# Patient Record
Sex: FEMALE | Race: WHITE | Employment: FULL TIME | URBAN - METROPOLITAN AREA
[De-identification: names, ages, dates, MRNs, and addresses within clinical notes are randomized per-mention and may not be internally consistent; named-entity substitution may affect disease eponyms.]

---

## 2017-01-05 ENCOUNTER — HOSPITAL ENCOUNTER (OUTPATIENT)
Dept: MAMMOGRAPHY | Facility: HOSPITAL | Age: 52
Discharge: HOME | End: 2017-01-05
Attending: INTERNAL MEDICINE
Payer: COMMERCIAL

## 2017-01-05 VITALS
DIASTOLIC BLOOD PRESSURE: 77 MMHG | OXYGEN SATURATION: 96 % | SYSTOLIC BLOOD PRESSURE: 114 MMHG | HEART RATE: 79 BPM | RESPIRATION RATE: 18 BRPM

## 2017-01-05 DIAGNOSIS — R92.8 ABNORMAL MAMMOGRAM OF RIGHT BREAST: ICD-10-CM

## 2017-01-05 PROCEDURE — 88305 TISSUE EXAM BY PATHOLOGIST: CPT | Performed by: INTERNAL MEDICINE

## 2017-01-05 PROCEDURE — 19081 BX BREAST 1ST LESION STRTCTC: CPT

## 2017-01-05 RX ORDER — SODIUM CHLORIDE 9 MG/ML
INJECTION, SOLUTION INTRAVENOUS
Status: DISPENSED
Start: 2017-01-05 | End: 2017-01-05

## 2017-01-05 RX ORDER — LIDOCAINE HYDROCHLORIDE AND EPINEPHRINE 10; 10 MG/ML; UG/ML
20 INJECTION, SOLUTION INFILTRATION; PERINEURAL ONCE
Status: COMPLETED | OUTPATIENT
Start: 2017-01-05 | End: 2017-01-05

## 2017-01-05 RX ORDER — LIDOCAINE HYDROCHLORIDE 10 MG/ML
5 INJECTION, SOLUTION EPIDURAL; INFILTRATION; INTRACAUDAL; PERINEURAL ONCE
Status: COMPLETED | OUTPATIENT
Start: 2017-01-05 | End: 2017-01-05

## 2017-01-05 RX ORDER — MAGNESIUM HYDROXIDE 1200 MG/15ML
250 LIQUID ORAL CONTINUOUS
Status: DISCONTINUED | OUTPATIENT
Start: 2017-01-05 | End: 2017-01-09

## 2017-01-05 RX ORDER — LIDOCAINE HYDROCHLORIDE AND EPINEPHRINE 10; 10 MG/ML; UG/ML
INJECTION, SOLUTION INFILTRATION; PERINEURAL
Status: DISPENSED
Start: 2017-01-05 | End: 2017-01-05

## 2017-01-05 RX ORDER — LIDOCAINE HYDROCHLORIDE 10 MG/ML
INJECTION, SOLUTION EPIDURAL; INFILTRATION; INTRACAUDAL; PERINEURAL
Status: DISPENSED
Start: 2017-01-05 | End: 2017-01-05

## 2017-01-05 RX ADMIN — LIDOCAINE HYDROCHLORIDE 2 ML: 10 INJECTION, SOLUTION EPIDURAL; INFILTRATION; INTRACAUDAL; PERINEURAL at 12:48:00

## 2017-01-05 RX ADMIN — LIDOCAINE HYDROCHLORIDE AND EPINEPHRINE 2 ML: 10; 10 INJECTION, SOLUTION INFILTRATION; PERINEURAL at 12:45:00

## 2017-01-05 NOTE — BRIEF PROCEDURE NOTE
INITIAL MAMMOGRAM COMPLETED BY  Saqib Cheng MAMMO TECH  HX  NEW CALCS  RIGHT BREAST NO FAMILY HX   BCP FOR 10 YEARS  NO HRT USE NO PREVIOUS BX    DR WESLEY HERE TO EXPLAIN PROCEDURE  AND RISK OF PROCEDURE QUESTIONS ANSWERED BREAST MAY BE TOO SMALL TO DO B

## 2017-01-09 ENCOUNTER — TELEPHONE (OUTPATIENT)
Dept: INTERNAL MEDICINE CLINIC | Facility: CLINIC | Age: 52
End: 2017-01-09

## 2017-01-09 ENCOUNTER — NURSE NAVIGATOR ENCOUNTER (OUTPATIENT)
Dept: HEMATOLOGY/ONCOLOGY | Facility: HOSPITAL | Age: 52
End: 2017-01-09

## 2017-01-09 NOTE — TELEPHONE ENCOUNTER
Sherly calling from the breast health center regarding if pt was given the results or if the doctor wanted the breast [facility] center to contact pt regarding results. .. please advise

## 2017-01-09 NOTE — TELEPHONE ENCOUNTER
Result Notes      Notes Recorded by Britta Nissen, MD on 1/7/2017 at 4:33 PM  Send  Letter and copy of test result.   Notified findings Benign

## 2017-01-10 ENCOUNTER — TELEPHONE (OUTPATIENT)
Dept: FAMILY MEDICINE CLINIC | Facility: CLINIC | Age: 52
End: 2017-01-10

## 2017-01-10 NOTE — TELEPHONE ENCOUNTER
Rebeca, called to clarify if pt aware of breast biopsy result. Informed:  Result Notes        Notes Recorded by Lor Brady MD on 1/7/2017 at 4:33 PM  Send  Letter and copy of test result.   Notified findings Benign

## 2017-02-15 RX ORDER — FEXOFENADINE HCL AND PSEUDOEPHEDRINE HCI 60; 120 MG/1; MG/1
TABLET, EXTENDED RELEASE ORAL
Qty: 90 TABLET | Refills: 3 | Status: SHIPPED | OUTPATIENT
Start: 2017-02-15 | End: 2017-08-14

## 2017-02-15 NOTE — TELEPHONE ENCOUNTER
Last issued on 4/2013, please advise    Recent Visits       Provider Department Primary Dx    3 months ago Randi Barragan MD [facility], [address], [place] Dysuria    7 months ago Gemma Burton MD [facility], [facility], [address]

## 2017-03-06 ENCOUNTER — OFFICE VISIT (OUTPATIENT)
Dept: INTERNAL MEDICINE CLINIC | Facility: CLINIC | Age: 52
End: 2017-03-06

## 2017-03-06 VITALS
DIASTOLIC BLOOD PRESSURE: 86 MMHG | HEIGHT: 65 IN | HEART RATE: 83 BPM | BODY MASS INDEX: 18.49 KG/M2 | SYSTOLIC BLOOD PRESSURE: 122 MMHG | WEIGHT: 111 LBS

## 2017-03-06 DIAGNOSIS — M54.50 ACUTE LEFT-SIDED LOW BACK PAIN WITHOUT SCIATICA: Primary | ICD-10-CM

## 2017-03-06 PROCEDURE — 99213 OFFICE O/P EST LOW 20 MIN: CPT | Performed by: INTERNAL MEDICINE

## 2017-03-06 PROCEDURE — 99212 OFFICE O/P EST SF 10 MIN: CPT | Performed by: INTERNAL MEDICINE

## 2017-03-06 RX ORDER — METHOCARBAMOL 500 MG/1
500 TABLET, FILM COATED ORAL 3 TIMES DAILY
Qty: 40 TABLET | Refills: 0 | Status: SHIPPED | OUTPATIENT
Start: 2017-03-06 | End: 2017-08-14

## 2017-03-06 NOTE — PROGRESS NOTES
HPI:    Patient ID: Edward Holman is a 49year old female.     HPI    radiates down to left leg   Standing up pain goes down the leg  Pain mild to moderated   Relieved by rest and prn otc pain med temporarily  Ongoing for more than a week    /86 m Allergic rhinitis    • Allergy    • Seizures [facility])           Past Surgical History    OTHER SURGICAL HISTORY  2005    Comment Brain and neck    OTHER SURGICAL HISTORY Left 1983    Comment Knee    OTHER SURGICAL HISTORY  1985    Comment Devated septum

## 2017-03-06 NOTE — PATIENT INSTRUCTIONS
Back Care Tips    Caring for your back  These are things you can do to prevent a recurrence of acute back pain and to reduce symptoms from chronic back pain:  · Maintain a healthy weight.  If you are overweight, losing weight will help most types of back · Be careful if you are given prescription pain medicines, narcotics, or medicine for muscle spasm. They can cause drowsiness, affect your coordination, reflexes, and judgment. Do not drive or operate heavy machinery while taking these types of medicines. The best way to sleep is on your side with your knees bent. Put a low pillow under your head to support your neck in a neutral spine position. Avoid thick pillows that bend your neck to one side.  Put a pillow between your legs to further relax your lower b · It can be localized to one spot or area, or it can be more generalized. · It can spread or radiate upwards, to the front, or go down your arms or legs (sciatica). · It can cause muscle spasm.   Most of the time, mechanical problems with the muscles or s · At first, do not try to stretch out the sore spots. If there is a strain, it is not like the good soreness you get after exercising without an injury. In this case, stretching may make it worse. · Avoid prolong sitting, long car rides, or travel.  This p · Be careful if you are given a prescription medicines, narcotics, or medicine for muscle spasms. They can cause drowsiness, affect your coordination, reflexes, and judgement. Do not drive or operate heavy machinery.   Follow-up care  Follow up with your he Most of the time, mechanical problems with the muscles or spine cause the pain. Mechanical problems are usually caused by an injury to the muscles or ligaments. While illness can cause back pain, it is usually not caused by a serious illness.  Mechanical pr · During the first 24 to 72 hours after an acute injury or flare up of chronic back pain, apply an ice pack to the painful area for 20 minutes and then remove it for 20 minutes. Do this over a period of 60 to 90 minutes or several times a day.  This will re · Trouble breathing  · Confusion  · Very drowsy or trouble awakening  · Fainting or loss of consciousness  · Rapid or very slow heart rate  · Loss of bowel or bladder control  When to seek medical advice  Call your healthcare provider right away if any of · Tuck your head and lift (arch) your back. · Hold for 5 seconds  · Return to starting position. · Repeat 5 times. Date Last Reviewed: 8/16/2015  © 0547-4888 The [address] [place] [address]. All rights reserved.  This

## 2017-05-31 ENCOUNTER — HOSPITAL ENCOUNTER (OUTPATIENT)
Age: 52
Discharge: HOME | End: 2017-05-31
Attending: EMERGENCY MEDICINE
Payer: COMMERCIAL

## 2017-05-31 ENCOUNTER — APPOINTMENT (OUTPATIENT)
Dept: GENERAL RADIOLOGY | Age: 52
End: 2017-05-31
Attending: EMERGENCY MEDICINE
Payer: COMMERCIAL

## 2017-05-31 ENCOUNTER — PATIENT MESSAGE (OUTPATIENT)
Dept: INTERNAL MEDICINE CLINIC | Facility: CLINIC | Age: 52
End: 2017-05-31

## 2017-05-31 VITALS
OXYGEN SATURATION: 100 % | RESPIRATION RATE: 16 BRPM | SYSTOLIC BLOOD PRESSURE: 142 MMHG | BODY MASS INDEX: 19.16 KG/M2 | DIASTOLIC BLOOD PRESSURE: 82 MMHG | HEIGHT: 65 IN | HEART RATE: 82 BPM | TEMPERATURE: 98 F | WEIGHT: 115 LBS

## 2017-05-31 DIAGNOSIS — M54.9 BACK PAIN WITH RADIATION: ICD-10-CM

## 2017-05-31 DIAGNOSIS — M62.838 SPASM OF MUSCLE: ICD-10-CM

## 2017-05-31 DIAGNOSIS — M54.32 SCIATICA OF LEFT SIDE: Primary | ICD-10-CM

## 2017-05-31 PROCEDURE — 99204 OFFICE O/P NEW MOD 45 MIN: CPT

## 2017-05-31 PROCEDURE — 72100 X-RAY EXAM L-S SPINE 2/3 VWS: CPT | Performed by: EMERGENCY MEDICINE

## 2017-05-31 PROCEDURE — 99213 OFFICE O/P EST LOW 20 MIN: CPT

## 2017-05-31 RX ORDER — DIAZEPAM 5 MG/1
5 TABLET ORAL EVERY 8 HOURS PRN
Qty: 10 TABLET | Refills: 0 | Status: SHIPPED | OUTPATIENT
Start: 2017-05-31 | End: 2017-08-14

## 2017-05-31 RX ORDER — NAPROXEN 500 MG/1
500 TABLET ORAL 2 TIMES DAILY PRN
Qty: 14 TABLET | Refills: 0 | Status: SHIPPED | OUTPATIENT
Start: 2017-05-31 | End: 2017-06-07

## 2017-05-31 RX ORDER — LIDOCAINE 50 MG/G
1 PATCH TOPICAL EVERY 24 HOURS
Qty: 15 PATCH | Refills: 0 | Status: SHIPPED | OUTPATIENT
Start: 2017-05-31 | End: 2017-06-15

## 2017-05-31 NOTE — ED INITIAL ASSESSMENT (HPI)
PATIENT ARRIVED AMBULATORY TO ROOM C/O PAIN ORIGINATING IN THE LEFT BUTTOCKS AND \"SHOOTING\" DOWN THE LEFT LEG. PATIENT STATES PAIN STARTED 2 DAYS AGO. PATIENT DENIES ACUTE INJURY.  PATIENT STATES \"I WAS DOING SOME YARD WORK AND THEN JOE BEEN DOING A LOT

## 2017-05-31 NOTE — TELEPHONE ENCOUNTER
From: Laila Schaffer  To: Arlina Baumgarten, MD  Sent: 5/31/2017 12:16 AM CDT  Subject: Other    I have severe pain shooting down my left buttock down my leg from my lower back generating from my lower back. Had an office visit for this previously.  Is muc

## 2017-05-31 NOTE — ED PROVIDER NOTES
Patient Seen in: [facility]    History   Patient presents with:  Back Pain (musculoskeletal)    Stated Complaint: lower back pain left leg    HPI    55-year-old female patient presents her complaining of 5 days of low kenji Smokeless Status: Never Used                        Alcohol Use: Yes           0.0 oz/week       0 Standard drinks or equivalent per week       Comment: Beer and Wine, Occasionally;        Review of Systems    Positive for stated complai Elvin Schumacher [address]  [address]  [phone]    Schedule an appointment as soon as possible for a visit  For reevaluation of your symptoms      Medications Prescribed:  Current Discharge Medication List    START taking these medications

## 2017-06-01 ENCOUNTER — HOSPITAL ENCOUNTER (EMERGENCY)
Facility: HOSPITAL | Age: 52
Discharge: HOME | End: 2017-06-01
Attending: EMERGENCY MEDICINE
Payer: COMMERCIAL

## 2017-06-01 ENCOUNTER — TELEPHONE (OUTPATIENT)
Dept: FAMILY MEDICINE CLINIC | Facility: CLINIC | Age: 52
End: 2017-06-01

## 2017-06-01 VITALS
TEMPERATURE: 98 F | WEIGHT: 113 LBS | RESPIRATION RATE: 16 BRPM | OXYGEN SATURATION: 100 % | SYSTOLIC BLOOD PRESSURE: 105 MMHG | DIASTOLIC BLOOD PRESSURE: 56 MMHG | BODY MASS INDEX: 18.83 KG/M2 | HEART RATE: 58 BPM | HEIGHT: 65 IN

## 2017-06-01 DIAGNOSIS — M54.40 ACUTE RIGHT-SIDED LOW BACK PAIN WITH SCIATICA, SCIATICA LATERALITY UNSPECIFIED: Primary | ICD-10-CM

## 2017-06-01 PROCEDURE — 99283 EMERGENCY DEPT VISIT LOW MDM: CPT

## 2017-06-01 PROCEDURE — 96372 THER/PROPH/DIAG INJ SC/IM: CPT

## 2017-06-01 RX ORDER — HYDROMORPHONE HYDROCHLORIDE 1 MG/ML
1 INJECTION, SOLUTION INTRAMUSCULAR; INTRAVENOUS; SUBCUTANEOUS ONCE
Status: COMPLETED | OUTPATIENT
Start: 2017-06-01 | End: 2017-06-01

## 2017-06-01 RX ORDER — HYDROCODONE BITARTRATE AND ACETAMINOPHEN 5; 325 MG/1; MG/1
1-2 TABLET ORAL EVERY 4 HOURS PRN
Qty: 20 TABLET | Refills: 0 | Status: SHIPPED | OUTPATIENT
Start: 2017-06-01 | End: 2017-06-02

## 2017-06-01 RX ORDER — ONDANSETRON 4 MG/1
4 TABLET, ORALLY DISINTEGRATING ORAL ONCE
Status: COMPLETED | OUTPATIENT
Start: 2017-06-01 | End: 2017-06-01

## 2017-06-01 RX ORDER — TRAMADOL HYDROCHLORIDE 50 MG/1
TABLET ORAL EVERY 4 HOURS PRN
Qty: 20 TABLET | Refills: 0 | Status: SHIPPED | OUTPATIENT
Start: 2017-06-01 | End: 2017-06-05

## 2017-06-01 NOTE — ED INITIAL ASSESSMENT (HPI)
Pt states she was dx with back spasms at [facility] yesterday and now pain is worse radiating down left leg, medication on helping

## 2017-06-01 NOTE — TELEPHONE ENCOUNTER
Dr. Vivienne Kat: please advise    Actions Requested: Patient continues with pain despite current medication regimen given by Immediate care. Please advise if any other pain reliever/or recommendations.   Situation/Background   Problem: left side buttock pain/low

## 2017-06-01 NOTE — ED PROVIDER NOTES
Patient Seen in: [facility] AND [facility] Emergency Department    History   Patient presents with:  Back Pain (musculoskeletal)    Stated Complaint: pain shooting down leg    HPI    Patient presents with 6 days of pain in the left lower back radiating down the 1 tablet (500 mg total) by mouth 3 (three) times daily. PRN   Fexofenadine-Pseudoephed ER  MG Oral Tablet 12 Hr,  Take  by mouth.  take 1 by Oral route  every 24 hours       Family History   Problem Relation Age of Onset   • Cancer Father      Stomach auscultation bilaterally with good effort. No wheezes, ronchi, or rales. Abdomen:  Soft, non-tender, non-distended. No masses, no hepato-splenomegaly. Negative McBurney's point tenderness. Musculoskeletal:  Good muscle tone.   Lower extremity pulses 2+ [id number]  [phone]    In 3 days  For re-check    Monik Aguilar MD  [facility] [address]  [address] [place] [address] Ave., Oniel Black, [address]  [address] [place] [address] [phone]            Medications Prescribed:

## 2017-06-01 NOTE — ED NOTES
Called patient as per smart er patient feeling worse. Patient tearful during phone conversation, stating pain is severe and unrelieved by medications prescribed in ic. Reccommended that patient present to er for further evaluation.

## 2017-06-02 ENCOUNTER — HOSPITAL ENCOUNTER (OUTPATIENT)
Dept: MRI IMAGING | Age: 52
Discharge: HOME | End: 2017-06-02
Attending: INTERNAL MEDICINE
Payer: COMMERCIAL

## 2017-06-02 ENCOUNTER — OFFICE VISIT (OUTPATIENT)
Dept: INTERNAL MEDICINE CLINIC | Facility: CLINIC | Age: 52
End: 2017-06-02

## 2017-06-02 VITALS
HEIGHT: 65 IN | SYSTOLIC BLOOD PRESSURE: 143 MMHG | RESPIRATION RATE: 18 BRPM | TEMPERATURE: 98 F | BODY MASS INDEX: 18.66 KG/M2 | DIASTOLIC BLOOD PRESSURE: 84 MMHG | WEIGHT: 112 LBS | HEART RATE: 71 BPM

## 2017-06-02 DIAGNOSIS — M54.32 SCIATICA OF LEFT SIDE: Primary | ICD-10-CM

## 2017-06-02 DIAGNOSIS — M54.32 SCIATICA OF LEFT SIDE: ICD-10-CM

## 2017-06-02 PROCEDURE — 99214 OFFICE O/P EST MOD 30 MIN: CPT | Performed by: INTERNAL MEDICINE

## 2017-06-02 PROCEDURE — 81002 URINALYSIS NONAUTO W/O SCOPE: CPT | Performed by: INTERNAL MEDICINE

## 2017-06-02 PROCEDURE — 72148 MRI LUMBAR SPINE W/O DYE: CPT | Performed by: INTERNAL MEDICINE

## 2017-06-02 PROCEDURE — 99213 OFFICE O/P EST LOW 20 MIN: CPT | Performed by: INTERNAL MEDICINE

## 2017-06-02 RX ORDER — LIDOCAINE 50 MG/G
1 PATCH TOPICAL EVERY 24 HOURS
Qty: 1 PATCH | Refills: 0 | Status: ON HOLD
Start: 2017-06-02 | End: 2017-06-05

## 2017-06-02 RX ORDER — METHYLPREDNISOLONE 4 MG/1
TABLET ORAL
Qty: 1 PACKAGE | Refills: 0 | Status: ON HOLD | OUTPATIENT
Start: 2017-06-02 | End: 2017-06-06

## 2017-06-02 NOTE — PROGRESS NOTES
HPI:    Patient ID: Denzel Del Toro is a 47year old female.     HPI    Follow up   Was in the ER  With sciatica  Xray arthriis noted  Pt given NSAID and tramdol pain 10/10  Presents for follow up    /84 mmHg  Pulse 71  Temp(Src) 97.6 °F (36.4 °C) ( needed. Disp:  Rfl:    Naproxen Sodium (ALEVE OR) Take by mouth as needed. Disp:  Rfl:    diazepam 5 MG Oral Tab Take 1 tablet (5 mg total) by mouth every 8 (eight) hours as needed (For your back spasm).  Disp: 10 tablet Rfl: 0   lidocaine 5 % External Patc oropharyngeal exudate. Eyes: Conjunctivae and EOM are normal. Pupils are equal, round, and reactive to light. Right eye exhibits no discharge. Left eye exhibits no discharge. No scleral icterus. Neck: Neck supple. No thyromegaly present.    Karmen Ortiz 1.013   URINE PH      5.0 - 8.0 6.0   PROTEIN, URINE, QUAL. Negative mg/dL Negative   GLUCOSE, URINE, QUAL. Negative mg/dL Negative   KETONES, URINE      Negative mg/dL Negative   BILIRUBIN, URINE, QUAL.       Negative Negative   BLOOD, URINE CONCLUSION:       L3-L4: Mild central stenosis. Mild bilateral foraminal narrowing.      L4-L5: Mild to moderate central stenosis. Mild bilateral foraminal narrowing.      L5-S1: Moderate left, and mild right foraminal narrowing.  Broad disc bulge contact

## 2017-06-02 NOTE — TELEPHONE ENCOUNTER
Clinical Impression:  From ER MD    Acute right-sided low back pain with sciatica, sciatica laterality unspecified  (primary encounter diagnosis)  Xray was done   And given norco for pain  Should come in for follow up  Add to SDS tomorrow bet 1 to 2 PM  Ma

## 2017-06-04 ENCOUNTER — PATIENT MESSAGE (OUTPATIENT)
Dept: INTERNAL MEDICINE CLINIC | Facility: CLINIC | Age: 52
End: 2017-06-04

## 2017-06-05 ENCOUNTER — HOSPITAL ENCOUNTER (OUTPATIENT)
Facility: HOSPITAL | Age: 52
Setting detail: OBSERVATION
Discharge: HOME | End: 2017-06-06
Attending: EMERGENCY MEDICINE | Admitting: PHYSICAL MEDICINE & REHABILITATION
Payer: COMMERCIAL

## 2017-06-05 ENCOUNTER — TELEPHONE (OUTPATIENT)
Dept: INTERNAL MEDICINE CLINIC | Facility: CLINIC | Age: 52
End: 2017-06-05

## 2017-06-05 DIAGNOSIS — M54.59 INTRACTABLE LOW BACK PAIN: Primary | ICD-10-CM

## 2017-06-05 PROBLEM — M54.16 LUMBAR RADICULOPATHY: Status: ACTIVE | Noted: 2017-06-05

## 2017-06-05 PROBLEM — M51.36 ANNULAR TEAR OF LUMBAR DISC: Status: ACTIVE | Noted: 2017-06-05

## 2017-06-05 PROBLEM — M43.16 SPONDYLOLISTHESIS, LUMBAR REGION: Status: ACTIVE | Noted: 2017-06-05

## 2017-06-05 PROBLEM — M51.26 LUMBAR HERNIATED DISC: Status: ACTIVE | Noted: 2017-06-05

## 2017-06-05 PROBLEM — M48.061 LUMBAR FORAMINAL STENOSIS: Status: ACTIVE | Noted: 2017-06-05

## 2017-06-05 PROBLEM — M51.9 LUMBAR DISC DISEASE: Status: ACTIVE | Noted: 2017-06-05

## 2017-06-05 PROBLEM — M51.369 ANNULAR TEAR OF LUMBAR DISC: Status: ACTIVE | Noted: 2017-06-05

## 2017-06-05 PROBLEM — R73.9 HYPERGLYCEMIA: Status: ACTIVE | Noted: 2017-06-05

## 2017-06-05 PROBLEM — M48.061 LUMBAR STENOSIS: Status: ACTIVE | Noted: 2017-06-05

## 2017-06-05 PROCEDURE — 99219 INITIAL OBSERVATION CARE,LEVL II: CPT | Performed by: HOSPITALIST

## 2017-06-05 PROCEDURE — 99244 OFF/OP CNSLTJ NEW/EST MOD 40: CPT | Performed by: PHYSICAL MEDICINE & REHABILITATION

## 2017-06-05 RX ORDER — HYDROMORPHONE HYDROCHLORIDE 1 MG/ML
0.2 INJECTION, SOLUTION INTRAMUSCULAR; INTRAVENOUS; SUBCUTANEOUS EVERY 2 HOUR PRN
Status: DISCONTINUED | OUTPATIENT
Start: 2017-06-05 | End: 2017-06-06

## 2017-06-05 RX ORDER — TRAMADOL HYDROCHLORIDE 50 MG/1
TABLET ORAL EVERY 8 HOURS PRN
Qty: 90 TABLET | Refills: 0 | Status: SHIPPED
Start: 2017-06-05 | End: 2017-06-05

## 2017-06-05 RX ORDER — HYDROMORPHONE HYDROCHLORIDE 1 MG/ML
0.5 INJECTION, SOLUTION INTRAMUSCULAR; INTRAVENOUS; SUBCUTANEOUS ONCE
Status: COMPLETED | OUTPATIENT
Start: 2017-06-05 | End: 2017-06-05

## 2017-06-05 RX ORDER — 0.9 % SODIUM CHLORIDE 0.9 %
VIAL (ML) INJECTION
Status: COMPLETED
Start: 2017-06-05 | End: 2017-06-05

## 2017-06-05 RX ORDER — ONDANSETRON 2 MG/ML
4 INJECTION INTRAMUSCULAR; INTRAVENOUS EVERY 6 HOURS PRN
Status: DISCONTINUED | OUTPATIENT
Start: 2017-06-05 | End: 2017-06-06

## 2017-06-05 RX ORDER — TRAMADOL HYDROCHLORIDE 50 MG/1
100 TABLET ORAL EVERY 6 HOURS PRN
Status: DISCONTINUED | OUTPATIENT
Start: 2017-06-05 | End: 2017-06-06

## 2017-06-05 RX ORDER — HYDROMORPHONE HYDROCHLORIDE 1 MG/ML
0.4 INJECTION, SOLUTION INTRAMUSCULAR; INTRAVENOUS; SUBCUTANEOUS EVERY 2 HOUR PRN
Status: DISCONTINUED | OUTPATIENT
Start: 2017-06-05 | End: 2017-06-06

## 2017-06-05 RX ORDER — TRAMADOL HYDROCHLORIDE 50 MG/1
TABLET ORAL EVERY 8 HOURS PRN
Qty: 90 TABLET | Refills: 0 | Status: ON HOLD
Start: 2017-06-05 | End: 2017-06-06

## 2017-06-05 RX ORDER — DIAZEPAM 5 MG/ML
2.5 INJECTION, SOLUTION INTRAMUSCULAR; INTRAVENOUS ONCE
Status: COMPLETED | OUTPATIENT
Start: 2017-06-05 | End: 2017-06-05

## 2017-06-05 RX ORDER — ACETAMINOPHEN 325 MG/1
650 TABLET ORAL EVERY 6 HOURS PRN
Status: DISCONTINUED | OUTPATIENT
Start: 2017-06-05 | End: 2017-06-06

## 2017-06-05 RX ORDER — HYDROMORPHONE HYDROCHLORIDE 1 MG/ML
0.8 INJECTION, SOLUTION INTRAMUSCULAR; INTRAVENOUS; SUBCUTANEOUS EVERY 2 HOUR PRN
Status: DISCONTINUED | OUTPATIENT
Start: 2017-06-05 | End: 2017-06-06

## 2017-06-05 NOTE — ED INITIAL ASSESSMENT (HPI)
Pt presents to ED with a c/o lower back pain. Pt was seen in the ER last Wednesday with same complaint. Pt had an outpatient MRI done last Friday, showed stenosis of the spine and a bulging disc.  Pt ran out of pain meds last night and was sent to ED by PCP

## 2017-06-05 NOTE — DISCHARGE PLANNING
Spoke with Dr. Felipa Guerrero. Was told Darrin was on consult. Dr. Pepe Leal paged for follow up/consult.

## 2017-06-05 NOTE — TELEPHONE ENCOUNTER
Actions Requested: advised ED refused wants appt today.   Sent to ED  Situation/Background   Problem: back pain severe 10/10, sister called for pt   Onset: >5 days   Associated Symptoms: pt lying flat on floor can hear her moan and cry.  + nausea, denies lo tea-colored)  * Vomiting and pain over lower ribs of back (i.e., flank - kidney area)  * Weakness of a leg or foot (e.g., unable to bear weight, dragging foot)    Care Advice Discussed:  * Reassurance  * Cold or Heat  * Sleep  * Activity  * Pain Medicines

## 2017-06-05 NOTE — TELEPHONE ENCOUNTER
Patient went to [facility] ER today and was admitted.      Disposition and Plan      Clinical Impression:  Intractable low back pain  (primary encounter diagnosis)    Disposition:  Admit

## 2017-06-05 NOTE — ED PROVIDER NOTES
Patient Seen in: [facility] AND [facility] Emergency Department    History   Patient presents with:  Pain (neurologic)    Stated Complaint: Pain     HPI    Patient presents the emergency department complaining of severe left low back pain which radiates down her total) by mouth 2 (two) times daily as needed for Muscle spasms. methocarbamol (ROBAXIN) 500 MG Oral Tab,  Take 1 tablet (500 mg total) by mouth 3 (three) times daily. PRN   Fexofenadine-Pseudoephed ER  MG Oral Tablet 12 Hr,  Take  by mouth.  take 1 dorsiflexion the foot is strong and intact. There is no weakness. Strong pulses are noted distally. Neurological: She is alert and oriented to person, place, and time. Skin: Skin is warm and dry. No rash noted. Nursing note and vitals reviewed. Unknown

## 2017-06-05 NOTE — H&P
[facility] Patient Status:  Observation    3/17/1965 MRN [id number]   Location [facility] 1SW Attending Nathaniel Trejo MD   Hosp Day # 0 PCP Jacob Woo MD     Date:  2017  Date mg total) by mouth every 8 (eight) hours as needed (For your back spasm). lidocaine 5 % External Patch Place 1 patch onto the skin daily. naproxen 500 MG Oral Tab Take 1 tablet (500 mg total) by mouth 2 (two) times daily as needed.    Fexofenadine-Pseud Normocephalic, oral mucosa is moist.  Head:  Normocephalic, atraumatic. Neck:  Supple, non-tender  Respiratory:  Lungs are clear to auscultation, respirations are non-labored, breath sounds are equal, symmetrical chest wall expansion.   Cardiovascular:  No which she states she had a stroke, no residual from that, cont outpt follow up    DVT proph: SCDs      Lauren Ro MD  6/5/2017

## 2017-06-05 NOTE — TELEPHONE ENCOUNTER
Pt sister stts the pt has already f/u with the PCP for severe back pain  Pt sister stts they got a call for the pt to be seen today  Pt is still in severe pain and can not move with chills   Please advise

## 2017-06-06 ENCOUNTER — SURGERY (OUTPATIENT)
Age: 52
End: 2017-06-06

## 2017-06-06 ENCOUNTER — TELEPHONE (OUTPATIENT)
Dept: NEUROLOGY | Facility: CLINIC | Age: 52
End: 2017-06-06

## 2017-06-06 ENCOUNTER — APPOINTMENT (OUTPATIENT)
Dept: GENERAL RADIOLOGY | Facility: HOSPITAL | Age: 52
End: 2017-06-06
Attending: PHYSICAL MEDICINE & REHABILITATION
Payer: COMMERCIAL

## 2017-06-06 VITALS
RESPIRATION RATE: 16 BRPM | DIASTOLIC BLOOD PRESSURE: 59 MMHG | WEIGHT: 105 LBS | SYSTOLIC BLOOD PRESSURE: 105 MMHG | OXYGEN SATURATION: 98 % | BODY MASS INDEX: 17.49 KG/M2 | HEIGHT: 65 IN | HEART RATE: 61 BPM | TEMPERATURE: 98 F

## 2017-06-06 PROCEDURE — 99217 OBSERVATION CARE DISCHARGE: CPT | Performed by: HOSPITALIST

## 2017-06-06 PROCEDURE — 64483 NJX AA&/STRD TFRM EPI L/S 1: CPT | Performed by: PHYSICAL MEDICINE & REHABILITATION

## 2017-06-06 PROCEDURE — 3E0R33Z INTRODUCTION OF ANTI-INFLAMMATORY INTO SPINAL CANAL, PERCUTANEOUS APPROACH: ICD-10-PCS | Performed by: PHYSICAL MEDICINE & REHABILITATION

## 2017-06-06 PROCEDURE — 77002 NEEDLE LOCALIZATION BY XRAY: CPT | Performed by: PHYSICAL MEDICINE & REHABILITATION

## 2017-06-06 RX ORDER — HYDROCODONE BITARTRATE AND ACETAMINOPHEN 10; 325 MG/1; MG/1
1 TABLET ORAL EVERY 4 HOURS PRN
Status: DISCONTINUED | OUTPATIENT
Start: 2017-06-06 | End: 2017-06-06

## 2017-06-06 RX ORDER — TRAMADOL HYDROCHLORIDE 50 MG/1
TABLET ORAL EVERY 8 HOURS PRN
Qty: 30 TABLET | Refills: 0 | Status: SHIPPED | OUTPATIENT
Start: 2017-06-06 | End: 2017-06-13

## 2017-06-06 RX ORDER — TRAMADOL HYDROCHLORIDE 50 MG/1
TABLET ORAL EVERY 8 HOURS PRN
Qty: 30 TABLET | Refills: 0 | Status: SHIPPED | OUTPATIENT
Start: 2017-06-06 | End: 2017-06-06

## 2017-06-06 RX ORDER — TRAMADOL HYDROCHLORIDE 50 MG/1
50 TABLET ORAL EVERY 6 HOURS PRN
Qty: 60 TABLET | Refills: 0 | Status: SHIPPED | OUTPATIENT
Start: 2017-06-06 | End: 2017-06-06

## 2017-06-06 RX ORDER — TRIAMCINOLONE ACETONIDE 40 MG/ML
INJECTION, SUSPENSION INTRA-ARTICULAR; INTRAMUSCULAR AS NEEDED
Status: DISCONTINUED | OUTPATIENT
Start: 2017-06-06 | End: 2017-06-06 | Stop reason: HOSPADM

## 2017-06-06 RX ORDER — OXYCODONE AND ACETAMINOPHEN 10; 325 MG/1; MG/1
1 TABLET ORAL EVERY 4 HOURS PRN
Status: DISCONTINUED | OUTPATIENT
Start: 2017-06-06 | End: 2017-06-06

## 2017-06-06 RX ORDER — LIDOCAINE HYDROCHLORIDE 10 MG/ML
INJECTION, SOLUTION EPIDURAL; INFILTRATION; INTRACAUDAL; PERINEURAL AS NEEDED
Status: DISCONTINUED | OUTPATIENT
Start: 2017-06-06 | End: 2017-06-06 | Stop reason: HOSPADM

## 2017-06-06 NOTE — PAYOR COMM NOTE
OBSERVATION    Chief Complaint:    Patient presents with:  Pain (neurologic)        HPI:    Veronique Buchanan is a(n) 47year old female with a PMH of chronic back pain who presents with worsening left lower back pain.  She states that she was taking stero

## 2017-06-06 NOTE — PROGRESS NOTES
Patient was seen and examined s/p LESI. Pt feeling better. Tramadol is controlling pain. Discussed possible discharge plans with patient and  at the bedside. Discussed discharge meds, follow up, and instructions. All questions answered.   Muriel

## 2017-06-06 NOTE — CONSULTS
[facility] Patient Status:  Observation    3/17/1965 MRN [id number]   Location [facility] 1SW Attending Bladimir Arango MD   Hosp Day # 0 PCP Mei Reddy MD     Patient Identification  Mansoor Powell is a 47 ye (eight) hours as needed for Pain.  Disp: 90 tablet Rfl: 0   methylPREDNISolone (MEDROL) 4 MG Oral Tablet Therapy Pack TAKEN AS DIRECTED Disp: 1 Package Rfl: 0   diazepam 5 MG Oral Tab Take 1 tablet (5 mg total) by mouth every 8 (eight) hours as needed (For Reaction to local anesthetic No    Left Handed No    Right Handed Yes    Currently spends a great deal of time in the sun No    Hx of Spending [place] of Time in Sun Yes    Bad sunburns in the past Yes    Comment: once    T[employer] in the Past No downward response   Reflexes: 2+ in bilateral lower extremities   Gait:    Gait: left antalgic gait   Sit to Stand: moderate difficulty     Lumbar Spine Palpation:    Spinous Processes: Non-tender for all Spinous Processes   Z-joints: Tender at  left L5-S1

## 2017-06-06 NOTE — OPERATIVE REPORT
[facility] Outpatient Surgery    LUMBAR TRANSFORAMINAL   NAME:  Arnoldo Garcia    MR #:    [id number] :  3/17/1965     PHYSICIAN:  Hang Clifford        Operative Report    DATE OF PROCEDURE: 2017   PREOPERATIVE DIAGNOSES: Problem   left L air was aspirated at anytime.

## 2017-06-12 ENCOUNTER — TELEPHONE (OUTPATIENT)
Dept: NEUROLOGY | Facility: CLINIC | Age: 52
End: 2017-06-12

## 2017-06-12 DIAGNOSIS — M51.26 LUMBAR HERNIATED DISC: ICD-10-CM

## 2017-06-12 DIAGNOSIS — M48.061 LUMBAR FORAMINAL STENOSIS: ICD-10-CM

## 2017-06-12 DIAGNOSIS — M54.16 LUMBAR RADICULOPATHY: Primary | ICD-10-CM

## 2017-06-12 DIAGNOSIS — M43.16 SPONDYLOLISTHESIS, LUMBAR REGION: ICD-10-CM

## 2017-06-12 DIAGNOSIS — M51.9 LUMBAR DISC DISEASE: ICD-10-CM

## 2017-06-12 DIAGNOSIS — M51.36 ANNULAR TEAR OF LUMBAR DISC: ICD-10-CM

## 2017-06-12 DIAGNOSIS — M48.061 LUMBAR STENOSIS: ICD-10-CM

## 2017-06-12 NOTE — TELEPHONE ENCOUNTER
Pt had injection while hospitalized 06/06/17 left TFESI S1. Pt stated that one week out she has 50% relief of symptoms, still having some problems when sitting for extended periods,.  Pt has physical therapy order from Dr Francine Billings and told OK to start physic

## 2017-06-14 ENCOUNTER — TELEPHONE (OUTPATIENT)
Dept: NEUROLOGY | Facility: CLINIC | Age: 52
End: 2017-06-14

## 2017-06-14 NOTE — TELEPHONE ENCOUNTER
Pt called left VM. Pt told that order for physical therapy has been placed and given # to MARILYN physical therapy to schedule.

## 2017-06-14 NOTE — TELEPHONE ENCOUNTER
Called patient to advise insurance was verified and PT is a covered benefit and does not require authorization, L/M for patient to c/b with any questions or concerns

## 2017-06-15 ENCOUNTER — OFFICE VISIT (OUTPATIENT)
Dept: INTERNAL MEDICINE CLINIC | Facility: CLINIC | Age: 52
End: 2017-06-15

## 2017-06-15 VITALS
SYSTOLIC BLOOD PRESSURE: 106 MMHG | RESPIRATION RATE: 16 BRPM | HEART RATE: 71 BPM | WEIGHT: 104 LBS | DIASTOLIC BLOOD PRESSURE: 74 MMHG | BODY MASS INDEX: 17 KG/M2

## 2017-06-15 DIAGNOSIS — M54.16 LUMBAR RADICULOPATHY: Primary | ICD-10-CM

## 2017-06-15 DIAGNOSIS — M51.36 ANNULAR TEAR OF LUMBAR DISC: ICD-10-CM

## 2017-06-15 PROCEDURE — 99212 OFFICE O/P EST SF 10 MIN: CPT | Performed by: INTERNAL MEDICINE

## 2017-06-15 PROCEDURE — 99213 OFFICE O/P EST LOW 20 MIN: CPT | Performed by: INTERNAL MEDICINE

## 2017-06-15 RX ORDER — TRAMADOL HYDROCHLORIDE 50 MG/1
50 TABLET ORAL EVERY 6 HOURS PRN
Qty: 60 TABLET | Refills: 0 | Status: SHIPPED | OUTPATIENT
Start: 2017-06-15 | End: 2017-08-14

## 2017-06-15 NOTE — PROGRESS NOTES
HPI:    Patient ID: Migue Paiz is a 47year old female.     HPI    Date of Admission: 6/5/2017                    Date of Discharge: 6/6/17    Admitting Diagnosis: Intractable low back pain [M54.5]    Hospital Discharge Diagnoses: intractable back pa Hr Take  by mouth.  take 1 by Oral route  every 24 hours Disp: 90 tablet Rfl: 3     Allergies:No Known Allergies    HISTORY:  Past Medical History   Diagnosis Date   • Allergic rhinitis    • Allergy    • Seizures ([facility]    • Seizure disorder ([facility]    • Strok NITRITE, URINE      Negative   negative   LEUKOCYTES      Negative   negative   APPEARANCE      Clear   clear   URINE-COLOR      Yellow   dark yellow   Multistix Lot#         [id number]   Multistix Expiration Date         9-2017   WBC      4.0-11.0 K/UL 8.0 Visit:  No prescriptions requested or ordered in this encounter    Imaging & Referrals:  None        #[id number]

## 2017-06-20 ENCOUNTER — OFFICE VISIT (OUTPATIENT)
Dept: PHYSICAL THERAPY | Facility: HOSPITAL | Age: 52
End: 2017-06-20
Attending: PHYSICAL MEDICINE & REHABILITATION
Payer: COMMERCIAL

## 2017-06-20 DIAGNOSIS — M54.16 LUMBAR RADICULOPATHY: Primary | ICD-10-CM

## 2017-06-20 DIAGNOSIS — M51.36 ANNULAR TEAR OF LUMBAR DISC: ICD-10-CM

## 2017-06-20 DIAGNOSIS — M51.26 LUMBAR HERNIATED DISC: ICD-10-CM

## 2017-06-20 DIAGNOSIS — M43.16 SPONDYLOLISTHESIS, LUMBAR REGION: ICD-10-CM

## 2017-06-20 DIAGNOSIS — M48.061 LUMBAR STENOSIS: ICD-10-CM

## 2017-06-20 DIAGNOSIS — M51.9 LUMBAR DISC DISEASE: ICD-10-CM

## 2017-06-20 DIAGNOSIS — M48.061 LUMBAR FORAMINAL STENOSIS: ICD-10-CM

## 2017-06-20 PROCEDURE — 97161 PT EVAL LOW COMPLEX 20 MIN: CPT

## 2017-06-20 PROCEDURE — 97530 THERAPEUTIC ACTIVITIES: CPT

## 2017-06-20 PROCEDURE — 97110 THERAPEUTIC EXERCISES: CPT

## 2017-06-20 NOTE — PROGRESS NOTES
LUMBAR SPINE EVALUATION:   Laila Schaffer    3/17/1965  Referring Physician:  Remi Clifford  Diagnosis: Lumbar radiculopathy (M54.16)  Lumbar herniated disc (M51.26)  Lumbar disc disease (M51.9)  Annular tear of lumbar disc (M51.36)  Lumbar stenosis malformation. She notes that she gets dizzy with curl ups. She had a stroke during her brain surgery and seizure disorder afterwards. History of current condition: see above.     Current functional limitations include lifting, sit>standing, walking - fati Leg pumps H X                 Standing R ilial self correct on chair H X       Thera Act 1. Pt ed  1. Scoliosis, pelvic torsion, spinal anatomy       H = HEP.  Pt given copies of all exercise for home program.  X = Exercises done this date - pt verbalized u independent in beginning level of HEP for stretching, posture and strengthening. 2. Pt will demonstrate good body mechanics awareness for prevention of reinjury. 3. Pt will be able to walk for 20 min for exercise without increased symptoms.   4. Pt will

## 2017-06-21 ENCOUNTER — OFFICE VISIT (OUTPATIENT)
Dept: PHYSICAL THERAPY | Facility: HOSPITAL | Age: 52
End: 2017-06-21
Attending: PHYSICAL MEDICINE & REHABILITATION
Payer: COMMERCIAL

## 2017-06-21 PROCEDURE — 97530 THERAPEUTIC ACTIVITIES: CPT

## 2017-06-21 PROCEDURE — 97110 THERAPEUTIC EXERCISES: CPT

## 2017-06-21 NOTE — PROGRESS NOTES
Mansoor Powell    3/17/1965  Referring Physician:  Soumya Clifford  Diagnosis: Lumbar radiculopathy (M54.16)  Lumbar herniated disc (M51.26)  Lumbar disc disease (M51.9)  Annular tear of lumbar disc (M51.36)  Lumbar stenosis (M48.06)  Spondylolisthesis, date - pt verbalized understanding and demonstrated competence. All exercises done B unless otherwise indicated. Pt advised to discontinue exercises that increase pain and to call or return to therapist to discuss.     Objective 6/20/2017:  Posture:  L sh mobility and control. Pt educated on her scoliosis with use of viewing MRI, discussion and demonstration of current and desired positioning.   Pt did well to return demonstration and verbalize understanding of home exercise program and educational topics t

## 2017-06-23 ENCOUNTER — APPOINTMENT (OUTPATIENT)
Dept: PHYSICAL THERAPY | Facility: HOSPITAL | Age: 52
End: 2017-06-23
Attending: PHYSICAL MEDICINE & REHABILITATION
Payer: COMMERCIAL

## 2017-06-26 ENCOUNTER — APPOINTMENT (OUTPATIENT)
Dept: PHYSICAL THERAPY | Facility: HOSPITAL | Age: 52
End: 2017-06-26
Attending: PHYSICAL MEDICINE & REHABILITATION
Payer: COMMERCIAL

## 2017-06-26 ENCOUNTER — TELEPHONE (OUTPATIENT)
Dept: NEUROLOGY | Facility: CLINIC | Age: 52
End: 2017-06-26

## 2017-06-26 ENCOUNTER — HOSPITAL ENCOUNTER (OUTPATIENT)
Dept: ULTRASOUND IMAGING | Age: 52
Discharge: HOME | End: 2017-06-26
Attending: INTERNAL MEDICINE
Payer: COMMERCIAL

## 2017-06-26 DIAGNOSIS — M48.061 LUMBAR STENOSIS: ICD-10-CM

## 2017-06-26 DIAGNOSIS — M48.061 LUMBAR FORAMINAL STENOSIS: Primary | ICD-10-CM

## 2017-06-26 DIAGNOSIS — M54.16 LUMBAR RADICULOPATHY: ICD-10-CM

## 2017-06-26 DIAGNOSIS — M54.59 INTRACTABLE LOW BACK PAIN: ICD-10-CM

## 2017-06-26 DIAGNOSIS — N28.1 CYST OF LEFT KIDNEY: ICD-10-CM

## 2017-06-26 DIAGNOSIS — M51.36 ANNULAR TEAR OF LUMBAR DISC: ICD-10-CM

## 2017-06-26 PROCEDURE — 76770 US EXAM ABDO BACK WALL COMP: CPT | Performed by: INTERNAL MEDICINE

## 2017-06-26 NOTE — TELEPHONE ENCOUNTER
Spoke to patient who has completed one week of physical therapy. Patient continues to have left buttocks pain radiating down to the left calf. Pain score of 2-3/10 and is aggravated with bending or lifting legs.  Continues hep daily and uses recumbent bike

## 2017-06-27 ENCOUNTER — OFFICE VISIT (OUTPATIENT)
Dept: PHYSICAL THERAPY | Facility: HOSPITAL | Age: 52
End: 2017-06-27
Attending: PHYSICAL MEDICINE & REHABILITATION
Payer: COMMERCIAL

## 2017-06-27 PROCEDURE — 97530 THERAPEUTIC ACTIVITIES: CPT

## 2017-06-27 PROCEDURE — 97112 NEUROMUSCULAR REEDUCATION: CPT

## 2017-06-27 PROCEDURE — 97110 THERAPEUTIC EXERCISES: CPT

## 2017-06-27 NOTE — PROGRESS NOTES
Bethany Babcock    3/17/1965  Referring Physician:  Patricia Clifford  Diagnosis: Lumbar radiculopathy (M54.16)  Lumbar herniated disc (M51.26)  Lumbar disc disease (M51.9)  Annular tear of lumbar disc (M51.36)  Lumbar stenosis (M48.06)  Spondylolisthesis, 6/27/2017      Supine R ilial self correct H X X X      Leg pumps H X X X               Standing R ilial self correct on chair H X X                Prone lying   X X      On elbow   X X      Press up w belt   X X     Thera Act 1. Lumbar rolls  2.  Sleeping 6/20/2017   Flex R wnl   Flex L wnl   IR R wnl   IR L wnl   ER R wnl   ER L wnl   Ext R wnl   Ext L wnl   Abd R wnl   Abd L wnl   *pain limiting     MMT 5/5 6/20/2017   L2- hip flex R 5   Hip flex L 5-   L3- knee ext R 5   Knee ext L 5   L4  ankle DF R 5

## 2017-06-28 ENCOUNTER — APPOINTMENT (OUTPATIENT)
Dept: PHYSICAL THERAPY | Facility: HOSPITAL | Age: 52
End: 2017-06-28
Attending: PHYSICAL MEDICINE & REHABILITATION
Payer: COMMERCIAL

## 2017-06-29 ENCOUNTER — OFFICE VISIT (OUTPATIENT)
Dept: PHYSICAL THERAPY | Facility: HOSPITAL | Age: 52
End: 2017-06-29
Attending: PHYSICAL MEDICINE & REHABILITATION
Payer: COMMERCIAL

## 2017-06-29 DIAGNOSIS — M48.061 LUMBAR STENOSIS: ICD-10-CM

## 2017-06-29 DIAGNOSIS — M48.061 LUMBAR FORAMINAL STENOSIS: ICD-10-CM

## 2017-06-29 DIAGNOSIS — M43.16 SPONDYLOLISTHESIS, LUMBAR REGION: ICD-10-CM

## 2017-06-29 DIAGNOSIS — M51.36 ANNULAR TEAR OF LUMBAR DISC: ICD-10-CM

## 2017-06-29 DIAGNOSIS — M51.9 LUMBAR DISC DISEASE: ICD-10-CM

## 2017-06-29 DIAGNOSIS — M54.16 LUMBAR RADICULOPATHY: ICD-10-CM

## 2017-06-29 DIAGNOSIS — M51.26 LUMBAR HERNIATED DISC: ICD-10-CM

## 2017-06-29 PROCEDURE — 97110 THERAPEUTIC EXERCISES: CPT

## 2017-06-29 PROCEDURE — 97530 THERAPEUTIC ACTIVITIES: CPT

## 2017-06-29 NOTE — PROGRESS NOTES
Suzy Jeter    3/17/1965  Referring Physician:  Maximilian Clifford  Diagnosis: Lumbar radiculopathy (M54.16)  Lumbar herniated disc (M51.26)  Lumbar disc disease (M51.9)  Annular tear of lumbar disc (M51.36)  Lumbar stenosis (M48.06)  Spondylolisthesis, Exercise HEP 6/20/2017 6/21/2017 6/27/2017 6/29/2017     Supine R ilial self correct H X X X X     Leg pumps H X X X X     Lumbar extn device     X     L LE, R UE reach H    X    Standing R ilial self correct on chair H X X      Ball 75 cm Trunk extn mod   * pain limiting    ROM Hip 6/20/2017   Flex R wnl   Flex L wnl   IR R wnl   IR L wnl   ER R wnl   ER L wnl   Ext R wnl   Ext L wnl   Abd R wnl   Abd L wnl   *pain limiting     MMT 5/5 6/20/2017   L2- hip flex R 5   Hip flex L 5-   L3- knee ext R 5

## 2017-07-07 ENCOUNTER — APPOINTMENT (OUTPATIENT)
Dept: PHYSICAL THERAPY | Facility: HOSPITAL | Age: 52
End: 2017-07-07
Attending: PHYSICAL MEDICINE & REHABILITATION
Payer: COMMERCIAL

## 2017-07-10 ENCOUNTER — OFFICE VISIT (OUTPATIENT)
Dept: PHYSICAL THERAPY | Facility: HOSPITAL | Age: 52
End: 2017-07-10
Attending: PHYSICAL MEDICINE & REHABILITATION
Payer: COMMERCIAL

## 2017-07-10 DIAGNOSIS — M54.16 LUMBAR RADICULOPATHY: ICD-10-CM

## 2017-07-10 DIAGNOSIS — M51.26 LUMBAR HERNIATED DISC: ICD-10-CM

## 2017-07-10 DIAGNOSIS — M51.36 ANNULAR TEAR OF LUMBAR DISC: ICD-10-CM

## 2017-07-10 DIAGNOSIS — M51.9 LUMBAR DISC DISEASE: ICD-10-CM

## 2017-07-10 DIAGNOSIS — M48.061 LUMBAR FORAMINAL STENOSIS: ICD-10-CM

## 2017-07-10 DIAGNOSIS — M43.16 SPONDYLOLISTHESIS, LUMBAR REGION: ICD-10-CM

## 2017-07-10 DIAGNOSIS — M48.061 LUMBAR STENOSIS: ICD-10-CM

## 2017-07-10 PROCEDURE — 97110 THERAPEUTIC EXERCISES: CPT

## 2017-07-10 PROCEDURE — 97112 NEUROMUSCULAR REEDUCATION: CPT

## 2017-07-10 PROCEDURE — 97140 MANUAL THERAPY 1/> REGIONS: CPT

## 2017-07-10 NOTE — TELEPHONE ENCOUNTER
Jayshree Fragoso would like a call back to discuss current condition. She is doing therapy with Orlando [facility]. She is scheduled to go on vacation Saturday 7-15-17 and feels she may benefit from another injection.  Please call to discuss

## 2017-07-10 NOTE — PROGRESS NOTES
Mansoor Powell    3/17/1965  Referring Physician:  Soumya Clifford  Diagnosis: Lumbar radiculopathy (M54.16)  Lumbar herniated disc (M51.26)  Lumbar disc disease (M51.9)  Annular tear of lumbar disc (M51.36)  Lumbar stenosis (M48.06)  Spondylolisthesis, 3/10        OBJECTIVE:     Treatment performed this date:    Therapeutic Exercise:  Position Exercise HEP 6/20/2017 6/21/2017  6/27/2017  6/29/2017  7/10/2017    Supine R ilial self correct H X X X X     Leg pumps  X X X X X    Leg pumps w ankle DF pumps H poor due to lack of mobility  Body Mechanics: poor demonstrated while reaching for personal belongings.   Gait: stiff, trunk flex  Pelvis: R ant ileum, R inf pubis, L on L sacral torsion  Lumbar: tight for L TP PA glide T9-L3, central PA glides severe limit 30 min for exercise without increased symptoms. 5. Pt will be able to bend to the floor for light objects without increased symptoms. 6. Pt will be able to lift a moderate laundry basket.       PLAN OF CARE:        Continue PT per original plan for therap

## 2017-07-11 ENCOUNTER — TELEPHONE (OUTPATIENT)
Dept: NEUROLOGY | Facility: CLINIC | Age: 52
End: 2017-07-11

## 2017-07-11 ENCOUNTER — OFFICE VISIT (OUTPATIENT)
Dept: NEUROLOGY | Facility: CLINIC | Age: 52
End: 2017-07-11

## 2017-07-11 VITALS
SYSTOLIC BLOOD PRESSURE: 118 MMHG | HEART RATE: 62 BPM | BODY MASS INDEX: 17.66 KG/M2 | DIASTOLIC BLOOD PRESSURE: 64 MMHG | WEIGHT: 106 LBS | HEIGHT: 65 IN

## 2017-07-11 DIAGNOSIS — M54.16 LUMBAR RADICULOPATHY: Primary | ICD-10-CM

## 2017-07-11 DIAGNOSIS — M51.26 LUMBAR HERNIATED DISC: ICD-10-CM

## 2017-07-11 PROCEDURE — 62322 NJX INTERLAMINAR LMBR/SAC: CPT | Performed by: PHYSICAL MEDICINE & REHABILITATION

## 2017-07-11 RX ORDER — LIDOCAINE HYDROCHLORIDE 10 MG/ML
12 INJECTION, SOLUTION EPIDURAL; INFILTRATION; INTRACAUDAL; PERINEURAL ONCE
Status: COMPLETED | OUTPATIENT
Start: 2017-07-11 | End: 2017-07-11

## 2017-07-11 RX ORDER — TRIAMCINOLONE ACETONIDE 40 MG/ML
120 INJECTION, SUSPENSION INTRA-ARTICULAR; INTRAMUSCULAR ONCE
Status: COMPLETED | OUTPATIENT
Start: 2017-07-11 | End: 2017-07-11

## 2017-07-11 RX ORDER — LIDOCAINE HYDROCHLORIDE 10 MG/ML
3 INJECTION, SOLUTION EPIDURAL; INFILTRATION; INTRACAUDAL; PERINEURAL ONCE
Status: COMPLETED | OUTPATIENT
Start: 2017-07-11 | End: 2017-07-11

## 2017-07-11 NOTE — TELEPHONE ENCOUNTER
Recalled pt. Pt is having left buttock pain radiating to left calf. Before injection of 6/6/17 S1 TFESI Dr Mirella Jolly discussed need for second inejction and pt feel she would benefit from second injection. Pt is wondering what she can due for pain,.  Pt stated

## 2017-07-11 NOTE — PROGRESS NOTES
[id number]/[id number]. Pt still having some numbness in legs bilaterally,able to ambulate with assist. Gait unsteady. Pt to car in wheelchair instructed to go home rest numbness should subside over next couple of hours. Told to call service if any problems.  Site in

## 2017-07-11 NOTE — TELEPHONE ENCOUNTER
Called [facility] for authorization of approval of caudal injection cpt code 50994,54974,([place] needed). Talked to [employer]. who states no authorization is required. Reference # [id number]  Will inform Nursing.

## 2017-07-11 NOTE — PATIENT INSTRUCTIONS
Refill policies:    • Allow 2 business days for refills; controlled substances may take longer.   • Contact your pharmacy at least 5 days prior to running out of medication and have them send an electronic request or submit request through the “request re your physician has recommended that you have a procedure or additional testing performed. [facility] will contact your insurance carrier to obtain pre-certification or prior authorization.     Unfortunately, MARILYN has seen an increas

## 2017-07-12 ENCOUNTER — TELEPHONE (OUTPATIENT)
Dept: NEUROLOGY | Facility: CLINIC | Age: 52
End: 2017-07-12

## 2017-07-12 NOTE — TELEPHONE ENCOUNTER
Pt recalled stated that she slow regain sensation and feeling to area and gait was completely back to normal by 10PM that nite. Pt stated that she is sore in area. Explained that is normal and may be for few days.  Reminded pt to update Dr Claudia Reich in one week

## 2017-07-12 NOTE — TELEPHONE ENCOUNTER
Pt had numbness to legs post office caudal procedure. Dr Alvaro Cosby wanted pt update by phone in AM. [facility].

## 2017-07-13 ENCOUNTER — OFFICE VISIT (OUTPATIENT)
Dept: PHYSICAL THERAPY | Facility: HOSPITAL | Age: 52
End: 2017-07-13
Attending: PHYSICAL MEDICINE & REHABILITATION
Payer: COMMERCIAL

## 2017-07-13 DIAGNOSIS — M48.061 LUMBAR FORAMINAL STENOSIS: ICD-10-CM

## 2017-07-13 DIAGNOSIS — M51.26 LUMBAR HERNIATED DISC: ICD-10-CM

## 2017-07-13 DIAGNOSIS — M48.061 LUMBAR STENOSIS: ICD-10-CM

## 2017-07-13 DIAGNOSIS — M51.36 ANNULAR TEAR OF LUMBAR DISC: ICD-10-CM

## 2017-07-13 DIAGNOSIS — M43.16 SPONDYLOLISTHESIS, LUMBAR REGION: ICD-10-CM

## 2017-07-13 DIAGNOSIS — M54.16 LUMBAR RADICULOPATHY: ICD-10-CM

## 2017-07-13 DIAGNOSIS — M51.9 LUMBAR DISC DISEASE: ICD-10-CM

## 2017-07-13 PROCEDURE — 97530 THERAPEUTIC ACTIVITIES: CPT

## 2017-07-13 PROCEDURE — 97110 THERAPEUTIC EXERCISES: CPT

## 2017-07-13 NOTE — PROGRESS NOTES
Denzel Del Toro    3/17/1965  Referring Physician:  Luzma Clifford  Diagnosis: Lumbar radiculopathy (M54.16)  Lumbar herniated disc (M51.26)  Lumbar disc disease (M51.9)  Annular tear of lumbar disc (M51.36)  Lumbar stenosis (M48.06)  Spondylolisthesis, 6/29/2017  7/10/2017  7/13/2017    Supine R ilial self correct H X X X      Leg pumps  X X X X     Leg pumps w ankle DF pumps H    X     Lumbar extn device    X  X    L LE, R UE reach H   X X X    SKC     X X    DKC     X X    Crossed leg piriformis     X Intact to light touch of the LE dermatomes while in sitting. Abdominals: good tone, no diastasis  Lumbopelvic control: poor due to lack of mobility  Body Mechanics: poor demonstrated while reaching for personal belongings.   Gait: stiff, trunk flex  Pelvi mechanics awareness for prevention of reinjury. 3. Pt will be able to walk for 20 min for exercise without increased symptoms. 4. Pt will be able to ride her recumbent bike for 30 min for exercise without increased symptoms.   5. Pt will be able to bend t

## 2017-07-17 ENCOUNTER — APPOINTMENT (OUTPATIENT)
Dept: PHYSICAL THERAPY | Facility: HOSPITAL | Age: 52
End: 2017-07-17
Attending: PHYSICAL MEDICINE & REHABILITATION
Payer: COMMERCIAL

## 2017-07-17 ENCOUNTER — TELEPHONE (OUTPATIENT)
Dept: INTERNAL MEDICINE CLINIC | Facility: CLINIC | Age: 52
End: 2017-07-17

## 2017-07-17 DIAGNOSIS — N28.1 KIDNEY CYSTS: Primary | ICD-10-CM

## 2017-07-17 NOTE — TELEPHONE ENCOUNTER
----- Message from Cruz Sousa MD sent at 6/28/2017  1:04 AM CDT -----  Send letter and copy of test result. CONCLUSION: 2.6 cm simple cyst left lower pole.     Otherwise unremarkable renal ultrasound.     Give referral to urology  Call perla

## 2017-07-19 ENCOUNTER — APPOINTMENT (OUTPATIENT)
Dept: PHYSICAL THERAPY | Facility: HOSPITAL | Age: 52
End: 2017-07-19
Attending: PHYSICAL MEDICINE & REHABILITATION
Payer: COMMERCIAL

## 2017-07-24 ENCOUNTER — TELEPHONE (OUTPATIENT)
Dept: NEUROLOGY | Facility: CLINIC | Age: 52
End: 2017-07-24

## 2017-07-24 NOTE — TELEPHONE ENCOUNTER
Spoke with patient (verified ) and relayed doctor message. Verbalized understanding and agreement. Number for Urology given to patient for follow-up/referral. (PPO insurance).

## 2017-07-25 NOTE — TELEPHONE ENCOUNTER
Patient calling with condition update post caudal epidural steroid injection on 7/11/17. Patient states she received 90% relief from the injection with minimal numbness and tingling in the left ankle. Currently in physical therapy with good relief.  Patient

## 2017-07-26 ENCOUNTER — OFFICE VISIT (OUTPATIENT)
Dept: PHYSICAL THERAPY | Facility: HOSPITAL | Age: 52
End: 2017-07-26
Attending: PHYSICAL MEDICINE & REHABILITATION
Payer: COMMERCIAL

## 2017-07-26 PROCEDURE — 97530 THERAPEUTIC ACTIVITIES: CPT

## 2017-07-26 PROCEDURE — 97110 THERAPEUTIC EXERCISES: CPT

## 2017-07-26 PROCEDURE — 97140 MANUAL THERAPY 1/> REGIONS: CPT

## 2017-07-26 NOTE — PROGRESS NOTES
Trudy Hem    3/17/1965  Referring Physician:  Barbara Clifford  Diagnosis: Lumbar radiculopathy (M54.16)  Lumbar herniated disc (M51.26)  Lumbar disc disease (M51.9)  Annular tear of lumbar disc (M51.36)  Lumbar stenosis (M48.06)  Spondylolisthesis, OBJECTIVE:     Treatment performed this date:    Therapeutic Exercise:  Position Exercise HEP 6/27/2017  6/29/2017  7/10/2017  7/13/2017  7/26/2017    Supine R ilial self correct H X X       Leg pumps  X X X      Leg pumps w ankle DF pumps H   X  X    Lumb exercises done B unless otherwise indicated. Pt advised to discontinue exercises that increase pain and to call or return to therapist to discuss.     Prone instability testing:     Objective 6/20/2017:  Posture:  L shoulder elevated, flat lumbar and thor exercises in supine over pillows - advised to purchase exercise ball. She is also advised to do all exercises with careful attention to her symptoms and continue to progress slowly on a daily basis. Physical Therapy Goals:    1.  Pt will be independent

## 2017-08-02 ENCOUNTER — APPOINTMENT (OUTPATIENT)
Dept: PHYSICAL THERAPY | Facility: HOSPITAL | Age: 52
End: 2017-08-02
Attending: PHYSICAL MEDICINE & REHABILITATION
Payer: COMMERCIAL

## 2017-08-09 ENCOUNTER — APPOINTMENT (OUTPATIENT)
Dept: PHYSICAL THERAPY | Facility: HOSPITAL | Age: 52
End: 2017-08-09
Attending: PHYSICAL MEDICINE & REHABILITATION
Payer: COMMERCIAL

## 2017-08-09 PROCEDURE — 97530 THERAPEUTIC ACTIVITIES: CPT

## 2017-08-09 PROCEDURE — 97110 THERAPEUTIC EXERCISES: CPT

## 2017-08-09 NOTE — PROGRESS NOTES
Patient Name: Halima Tobias  YOB: 1965       MRN number:  [id number]  Date: 8/9/2017  Referring Physician:  Susan Arrieta    Progress Summary    Pt has attended 8, cancelled 0, and no shown 0 visits in Physical Therapy.      Subjective: mod   R SB min min   L SB Min-mod min   R Rotation Min-mod min   L Rotation mod Min (-)   * pain limiting    MMT 5/5 6/20/2017 8/9/2017    L2- hip flex R 5 5   Hip flex L 5- 5-   L3- knee ext R 5 5   Knee ext L 5 5   L4  ankle DF R 5 5   Ankle DF L 5 5   L co-sign or sign and return this letter via fax as soon as possible to [phone]. If you have any questions, please contact me at Dept: [phone].     Sincerely,  Electronically signed by therapist: Caroline Gandhi, PT    Physician's certification require She notes that she was on a ladder painting with reaching and twisting and had some increased back pain. Pt states that she is walking w her . Last night they walked for 2 miles have a rest and then again 2 miles.   She is also riding her recumben thoracic mobility to protect lumbar spine. Use of guadalupe pack vs back pack while hiking on vacation 3. Return to community exercise for aerobic and mobility and consistent performance of HEP ongoing. 3. Scoliosis alignment and corrective support  4.  Squat R wnl   Abd L wnl   *pain limiting     MMT 5/5 6/20/2017 8/9/2017    L2- hip flex R 5 5   Hip flex L 5- 5-   L3- knee ext R 5 5   Knee ext L 5 5   L4  ankle DF R 5 5   Ankle DF L 5 5   L5 – EHL R 5 5   EHL L 4+ 5-   S1 –ankle PF R 5 5   Ankle PF L 5 5   Ha to lift a moderate laundry basket. MET    NEW GOAL  7. Pt will demonstrate safe performance of high level lumbopelvic stability HEP. 8. Pt will be able to safely carry her work computer up and down stairs to classrooms.       PLAN OF CARE:      Continue PT

## 2017-08-11 ENCOUNTER — TELEPHONE (OUTPATIENT)
Dept: NEUROLOGY | Facility: CLINIC | Age: 52
End: 2017-08-11

## 2017-08-14 ENCOUNTER — OFFICE VISIT (OUTPATIENT)
Dept: SURGERY | Facility: CLINIC | Age: 52
End: 2017-08-14

## 2017-08-14 ENCOUNTER — OFFICE VISIT (OUTPATIENT)
Dept: NEUROLOGY | Facility: CLINIC | Age: 52
End: 2017-08-14

## 2017-08-14 VITALS
BODY MASS INDEX: 17.49 KG/M2 | DIASTOLIC BLOOD PRESSURE: 85 MMHG | HEIGHT: 65 IN | SYSTOLIC BLOOD PRESSURE: 143 MMHG | TEMPERATURE: 98 F | WEIGHT: 105 LBS | HEART RATE: 71 BPM

## 2017-08-14 VITALS
SYSTOLIC BLOOD PRESSURE: 118 MMHG | DIASTOLIC BLOOD PRESSURE: 78 MMHG | OXYGEN SATURATION: 98 % | WEIGHT: 105 LBS | HEIGHT: 65 IN | BODY MASS INDEX: 17.49 KG/M2 | RESPIRATION RATE: 15 BRPM | HEART RATE: 86 BPM

## 2017-08-14 DIAGNOSIS — R82.90 URINE FINDING: ICD-10-CM

## 2017-08-14 DIAGNOSIS — M51.9 LUMBAR DISC DISEASE: ICD-10-CM

## 2017-08-14 DIAGNOSIS — R31.0 GROSS HEMATURIA: Primary | ICD-10-CM

## 2017-08-14 DIAGNOSIS — M48.061 LUMBAR STENOSIS: ICD-10-CM

## 2017-08-14 DIAGNOSIS — M51.36 ANNULAR TEAR OF LUMBAR DISC: ICD-10-CM

## 2017-08-14 DIAGNOSIS — M51.26 LUMBAR HERNIATED DISC: ICD-10-CM

## 2017-08-14 DIAGNOSIS — M43.16 SPONDYLOLISTHESIS, LUMBAR REGION: ICD-10-CM

## 2017-08-14 DIAGNOSIS — M54.16 LUMBAR RADICULOPATHY: Primary | ICD-10-CM

## 2017-08-14 DIAGNOSIS — M48.061 LUMBAR FORAMINAL STENOSIS: ICD-10-CM

## 2017-08-14 LAB
APPEARANCE: CLEAR
MULTISTIX LOT#: NORMAL NUMERIC
PH, URINE: 6 (ref 4.5–8)
SPECIFIC GRAVITY: 1.02 (ref 1–1.03)
URINE-COLOR: YELLOW
UROBILINOGEN,SEMI-QN: 0.2 MG/DL (ref 0–1.9)

## 2017-08-14 PROCEDURE — 99214 OFFICE O/P EST MOD 30 MIN: CPT | Performed by: PHYSICAL MEDICINE & REHABILITATION

## 2017-08-14 PROCEDURE — 99212 OFFICE O/P EST SF 10 MIN: CPT | Performed by: UROLOGY

## 2017-08-14 PROCEDURE — 81003 URINALYSIS AUTO W/O SCOPE: CPT | Performed by: UROLOGY

## 2017-08-14 PROCEDURE — 99244 OFF/OP CNSLTJ NEW/EST MOD 40: CPT | Performed by: UROLOGY

## 2017-08-14 NOTE — PROGRESS NOTES
SUBJECTIVE:  Amaury Abreu is a 47year old female who presents for a consultation at the request of, and a copy of this note will be sent to, Dr. Tanner Busch, for evaluation of  hematuria and left renal cyst. She states that the problem is unchanged.  Advanced Electron Beams dizziness or fainting, palpitations, leg swelling, nocturia, or claudication. GASTROINTESTINAL:  Negative for nausea, vomiting, diarrhea, constipation, heartburn or indigestion, abdominal pains, bloody or tarry stools.   GENERAL: Denies:  weight gain, weig

## 2017-08-14 NOTE — PROGRESS NOTES
Low Back Pain H & P    Chief Complaint: Patient presents with:  Low Back Pain: pt here for follow up after caudal epidural steroid injection on 7/11/17 with 80-90% relief. pt c/o mild lower left side back pain.        Patient was last seen on 7/11/17 for a History  None on file     Social History Main Topics   Smoking status: Never Smoker    Smokeless tobacco: Never Used    Alcohol use Yes  0.0 oz/week     Comment: Beer and Wine, Occasionally;     Drug use: No    Sexual activity: Not on file     Other Topics Palpation:    Spinous Processes: Non-tender for all Spinous Processes   Z-joints: Non-tender for all Z-joints   SIJ: Non-tender for bilateral SIJ   Piriformis Muscle: Non-tender bilateral Piriformis muscles   Greater Trochanteric Bursa: Non-tender for bila

## 2017-08-14 NOTE — PATIENT INSTRUCTIONS
As of October 6th 2014, the Drug Enforcement Agency [facility]) is reclassifying all hydrocodone combination medications from Schedule III to Schedule II. This includes medications such as Norco, Vicodin, Lortab, Zohydro, and Vicoprofen.     What this means for y chart.      Plan  The patient does not need any injections at this time. The patient does not need any pain medications at this time. The patient will continue with PT.     The patient will continue with her home exercise program.    She will follow u

## 2017-08-15 LAB — NON GYNE INTERPRETATION: NEGATIVE

## 2017-08-16 ENCOUNTER — APPOINTMENT (OUTPATIENT)
Dept: PHYSICAL THERAPY | Facility: HOSPITAL | Age: 52
End: 2017-08-16
Attending: PHYSICAL MEDICINE & REHABILITATION
Payer: COMMERCIAL

## 2017-08-17 ENCOUNTER — HOSPITAL ENCOUNTER (OUTPATIENT)
Dept: CT IMAGING | Facility: HOSPITAL | Age: 52
Discharge: HOME | End: 2017-08-17
Attending: UROLOGY
Payer: COMMERCIAL

## 2017-08-17 DIAGNOSIS — R31.0 GROSS HEMATURIA: ICD-10-CM

## 2017-08-17 PROCEDURE — 76376 3D RENDER W/INTRP POSTPROCES: CPT | Performed by: UROLOGY

## 2017-08-17 PROCEDURE — 82565 ASSAY OF CREATININE: CPT

## 2017-08-17 PROCEDURE — 74178 CT ABD&PLV WO CNTR FLWD CNTR: CPT | Performed by: UROLOGY

## 2017-08-18 LAB — CREAT BLD-MCNC: 0.8 MG/DL (ref 0.5–1.5)

## 2017-08-21 ENCOUNTER — APPOINTMENT (OUTPATIENT)
Dept: PHYSICAL THERAPY | Facility: HOSPITAL | Age: 52
End: 2017-08-21
Attending: PHYSICAL MEDICINE & REHABILITATION
Payer: COMMERCIAL

## 2017-08-23 ENCOUNTER — OFFICE VISIT (OUTPATIENT)
Dept: DERMATOLOGY CLINIC | Facility: CLINIC | Age: 52
End: 2017-08-23

## 2017-08-23 DIAGNOSIS — D23.9 BENIGN NEOPLASM OF SKIN, UNSPECIFIED LOCATION: ICD-10-CM

## 2017-08-23 DIAGNOSIS — B07.8 OTHER VIRAL WARTS: Primary | ICD-10-CM

## 2017-08-23 PROCEDURE — 99213 OFFICE O/P EST LOW 20 MIN: CPT | Performed by: DERMATOLOGY

## 2017-08-23 PROCEDURE — 99212 OFFICE O/P EST SF 10 MIN: CPT | Performed by: DERMATOLOGY

## 2017-08-23 RX ORDER — IMIQUIMOD 12.5 MG/.25G
CREAM TOPICAL
Qty: 12 PACKET | Refills: 6 | Status: SHIPPED | OUTPATIENT
Start: 2017-08-23 | End: 2017-10-20

## 2017-08-24 ENCOUNTER — TELEPHONE (OUTPATIENT)
Dept: SURGERY | Facility: CLINIC | Age: 52
End: 2017-08-24

## 2017-08-24 DIAGNOSIS — R39.15 URGENCY OF URINATION: Primary | ICD-10-CM

## 2017-08-24 NOTE — TELEPHONE ENCOUNTER
pt called.  (please refer to patient's email 8/21/17)  Is having urinary issues. Asking for her CT results. Does she need to schedule a follow up. Please call.

## 2017-08-25 ENCOUNTER — APPOINTMENT (OUTPATIENT)
Dept: LAB | Facility: HOSPITAL | Age: 52
End: 2017-08-25
Attending: UROLOGY
Payer: COMMERCIAL

## 2017-08-25 DIAGNOSIS — R39.15 URGENCY OF URINATION: ICD-10-CM

## 2017-08-25 PROCEDURE — 87086 URINE CULTURE/COLONY COUNT: CPT

## 2017-08-25 PROCEDURE — 87088 URINE BACTERIA CULTURE: CPT

## 2017-08-25 PROCEDURE — 87186 SC STD MICRODIL/AGAR DIL: CPT

## 2017-08-25 RX ORDER — NITROFURANTOIN 25; 75 MG/1; MG/1
100 CAPSULE ORAL 2 TIMES DAILY
Qty: 10 CAPSULE | Refills: 0 | Status: SHIPPED | OUTPATIENT
Start: 2017-08-25 | End: 2017-08-30

## 2017-08-25 NOTE — TELEPHONE ENCOUNTER
I agree that CT urogram does not show any worrisome findings. I can discuss further with her at her upcoming cystoscopy appointment. We can start her empirically on Macrobid 100 mg p.o. twice daily for 5 days pending her final urine culture results.   Ple

## 2017-08-25 NOTE — TELEPHONE ENCOUNTER
Phoned pt back and spoke with her. She is asking for c.t. Results. Sarahi Donovan, please review and advised. Informed her it does not appear to have any worrisome urological issues, however Sarahi Donovan would need to review and advise on it. Asked her symptoms.  Merline Santo

## 2017-08-25 NOTE — TELEPHONE ENCOUNTER
Phoned pt and spoke with her. Read to her 's reply as outlined below in this encounter, in it's entirety. Pt verbalized understanding, agrees to plan,and is thankful.

## 2017-08-28 ENCOUNTER — APPOINTMENT (OUTPATIENT)
Dept: PHYSICAL THERAPY | Facility: HOSPITAL | Age: 52
End: 2017-08-28
Attending: PHYSICAL MEDICINE & REHABILITATION
Payer: COMMERCIAL

## 2017-08-30 ENCOUNTER — OFFICE VISIT (OUTPATIENT)
Dept: PHYSICAL THERAPY | Facility: HOSPITAL | Age: 52
End: 2017-08-30
Attending: PHYSICAL MEDICINE & REHABILITATION
Payer: COMMERCIAL

## 2017-08-30 PROCEDURE — 97110 THERAPEUTIC EXERCISES: CPT

## 2017-08-30 PROCEDURE — 97530 THERAPEUTIC ACTIVITIES: CPT

## 2017-08-30 PROCEDURE — 97140 MANUAL THERAPY 1/> REGIONS: CPT

## 2017-08-30 NOTE — PROGRESS NOTES
Denzel Del Toro    3/17/1965  Referring Physician:  Luzma Clifford  Diagnosis: Lumbar radiculopathy (M54.16)  Lumbar herniated disc (M51.26)  Lumbar disc disease (M51.9)  Annular tear of lumbar disc (M51.36)  Lumbar stenosis (M48.06)  Spondylolisthesi 1/10 1/10 0-1/10 0-1/10        OBJECTIVE:     Treatment performed this date:    Therapeutic Exercise:  Position Exercise HEP 7/10/2017  7/13/2017  7/26/2017  8/9/2017  8/30/2017    Supine R ilial self correct H    X X    Leg pumps  X        Leg pumps w ank thoracodorsal fascia  4. PA thoracolumbar  5. SCS hold to B asis and pubis  6. 3 lower sacral tender points released   US Ultrasound - units given 1  100% power, 1mHz, 1.2 w/cm2, 9 min to    1. Lumbar ps       H = HEP.  Pt given copies of all exercise for h wnl   Hip flexors L wnl wnl   Hip flexors R wnl wnl   Quads L wnl wnl   Quads R wnl wnl   TFL L wnl wnl   TFL R wnl wnl       ASSESSMENT:     Pt had mild flare up of pain with trying to perform unilat bridging exercises.   She states that she has had some w modalities as needed.     Charges: TE, TA, MT2

## 2017-09-04 NOTE — PROGRESS NOTES
Edward Turner is a 47year old female. HPI:     CC:  Patient presents with:  Warts: LOV: 8/17/16, pt presents with wart on left thumb. [id number]. States has used OTC wart removals with some improvement.          Allergies:  Review of patient's allergies status:   Spouse name: N/A    Years of education: N/A  Number of children: N/A     Occupational History  None on file     Social History Main Topics   Smoking status: Never Smoker    Smokeless tobacco: Never Used    Alcohol use Yes  0.0 oz/week pigmented lesions examined with dermoscopy benign-appearing patterns. Waxy tannish keratotic papules scattered, cherry-red vascular papules scattered. See map today's date for lesions noted . See assessment and plan below for specific lesions.     O

## 2017-09-06 ENCOUNTER — OFFICE VISIT (OUTPATIENT)
Dept: PHYSICAL THERAPY | Facility: HOSPITAL | Age: 52
End: 2017-09-06
Attending: PHYSICAL MEDICINE & REHABILITATION
Payer: COMMERCIAL

## 2017-09-06 PROCEDURE — 97110 THERAPEUTIC EXERCISES: CPT

## 2017-09-06 NOTE — PROGRESS NOTES
Patient Name: Edward Holman  YOB: 1965  MRN number: [id number]  Date:  9/6/2017  Referring Physician:  Zachary Garcia      Discharge Summary    Pt has attended 10, cancelled 0, and no shown 0 visits in Physical Therapy.      Subjective: Min (-)   * pain limiting    MMT 5/5 6/20/2017 9/6/2017    L2- hip flex R 5 5   Hip flex L 5- 5   L5 – EHL R 5 5   EHL L 4+ 5   *pain limiting    Physical Therapy Goals:  ASSESSED 9/6/2017   1.  Pt will be independent in beginning level of HEP for stretchin septum  Medication changes since last visit: No    Lumbar MRI 6-2-17 CONCLUSION: L lumbar scoliosis   L3-L4: Mild central stenosis. Mild bilateral foraminal narrowing. L4-L5: Mild to moderate central stenosis. Mild bilateral foraminal narrowing.   L5-S1: M Trunk extn          B shoulder horiz abd          B shoulder scaption          B shoulder flex          Pelvic rocking          Pelvic clock         Foam roll B shoulder horiz abd H         B shoulder scaption H         B shoulder flex H         Rigo wing Abdominals: good tone, no diastasis  Lumbopelvic control: poor due to lack of mobility  Body Mechanics: poor demonstrated while reaching for personal belongings.   Gait: stiff, trunk flex  Pelvis: R ant ileum, R inf pubis, L on L sacral torsion  Lumbar: t demonstrate safe performance of high level lumbopelvic stability HEP. MET  8. Pt will be able to safely carry her work computer up and down stairs to classrooms.  MET      PLAN OF CARE:      See above    Charges: Mariposa Vanessa,

## 2017-09-13 ENCOUNTER — APPOINTMENT (OUTPATIENT)
Dept: PHYSICAL THERAPY | Facility: HOSPITAL | Age: 52
End: 2017-09-13
Attending: PHYSICAL MEDICINE & REHABILITATION
Payer: COMMERCIAL

## 2017-09-20 ENCOUNTER — APPOINTMENT (OUTPATIENT)
Dept: PHYSICAL THERAPY | Facility: HOSPITAL | Age: 52
End: 2017-09-20
Attending: PHYSICAL MEDICINE & REHABILITATION
Payer: COMMERCIAL

## 2017-09-21 ENCOUNTER — OFFICE VISIT (OUTPATIENT)
Dept: SURGERY | Facility: CLINIC | Age: 52
End: 2017-09-21

## 2017-09-21 VITALS
BODY MASS INDEX: 17.49 KG/M2 | TEMPERATURE: 98 F | RESPIRATION RATE: 16 BRPM | HEIGHT: 65 IN | DIASTOLIC BLOOD PRESSURE: 62 MMHG | SYSTOLIC BLOOD PRESSURE: 114 MMHG | HEART RATE: 72 BPM | WEIGHT: 105 LBS

## 2017-09-21 DIAGNOSIS — R31.0 GROSS HEMATURIA: Primary | ICD-10-CM

## 2017-09-21 DIAGNOSIS — R39.15 URGENCY OF URINATION: ICD-10-CM

## 2017-09-21 PROCEDURE — 99212 OFFICE O/P EST SF 10 MIN: CPT | Performed by: UROLOGY

## 2017-09-21 PROCEDURE — 52000 CYSTOURETHROSCOPY: CPT | Performed by: UROLOGY

## 2017-09-21 PROCEDURE — 99213 OFFICE O/P EST LOW 20 MIN: CPT | Performed by: UROLOGY

## 2017-09-21 NOTE — PROGRESS NOTES
Trudy Bansal is a 47year old female. HPI:   Patient presents with:  Hematuria: here for cystoscopy      26-year-old female presents for office cystoscopy in follow-up to a previous visit August 14, 2017.   Evaluation includes a CT urogram which did glomerulation  U.O's: Normal  Trabeculation: none      POST CYSTOSCOPY MEDICATIONS: sample one tablet Cipro 500mg given to patient    DIAGNOSIS: see below    PLAN: see below           ASSESSMENT/PLAN:   Assessment   Urgency of urination  Gross hematuria  (

## 2017-09-26 ENCOUNTER — APPOINTMENT (OUTPATIENT)
Dept: PHYSICAL THERAPY | Facility: HOSPITAL | Age: 52
End: 2017-09-26
Attending: PHYSICAL MEDICINE & REHABILITATION
Payer: COMMERCIAL

## 2017-10-20 ENCOUNTER — OFFICE VISIT (OUTPATIENT)
Dept: DERMATOLOGY CLINIC | Facility: CLINIC | Age: 52
End: 2017-10-20

## 2017-10-20 DIAGNOSIS — D23.9 BENIGN NEOPLASM OF SKIN, UNSPECIFIED LOCATION: ICD-10-CM

## 2017-10-20 DIAGNOSIS — D18.01 CHERRY ANGIOMA: ICD-10-CM

## 2017-10-20 DIAGNOSIS — B07.8 OTHER VIRAL WARTS: Primary | ICD-10-CM

## 2017-10-20 PROCEDURE — 17110 DESTRUCTION B9 LES UP TO 14: CPT | Performed by: DERMATOLOGY

## 2017-10-20 PROCEDURE — 99212 OFFICE O/P EST SF 10 MIN: CPT | Performed by: DERMATOLOGY

## 2017-10-20 RX ORDER — IMIQUIMOD 12.5 MG/.25G
CREAM TOPICAL
Qty: 12 PACKET | Refills: 6 | Status: SHIPPED | OUTPATIENT
Start: 2017-10-20 | End: 2017-10-31

## 2017-10-24 ENCOUNTER — TELEPHONE (OUTPATIENT)
Dept: NEUROLOGY | Facility: CLINIC | Age: 52
End: 2017-10-24

## 2017-10-25 NOTE — TELEPHONE ENCOUNTER
Patient states she is experiencing left side low back and buttocks radiating down the leg that is constant for the last 4 days. Pain score of 6-7/10 at worst. Patient feels pain may have been exacerbated with return to work.    Continues hep, biking and str

## 2017-10-26 RX ORDER — CYCLOBENZAPRINE HCL 5 MG
5 TABLET ORAL 2 TIMES DAILY PRN
Qty: 30 TABLET | Refills: 0 | Status: SHIPPED | OUTPATIENT
Start: 2017-10-26 | End: 2017-12-06

## 2017-10-26 NOTE — TELEPHONE ENCOUNTER
Spoke to patient. States getting some relief from stretching exercises. Has tramadol but not taking, too drowsy next day to work. States relief from flexeril in past, prescription refilled  5 mg #30, take 1 tablet 2 times daily as needed. NOV 11/21/17.  A

## 2017-10-30 NOTE — PROGRESS NOTES
Veronique Buchanan is a 47year old female. Patient presents with:  Warts: \"Established Pt\"- LOV- 8-23-17. Pt  presents today for f/u with wart on left thumb.  Pt notes was treated at [address] [place] [address] and was using imiquimod which is completed now and no improveme Allergy    • Back problem    • Seizure disorder (HCC)    • Seizures ([facility] [address]    • Stroke [facility])      Past Surgical History:  No date: ARTHROSCOPY OF JOINT UNLISTED  No date: KNEE SURGERY  2005: OTHER SURGICAL HISTORY      Comment: Brain and neck  1983: OTHER S bumps.  No other skin complaints. History, medications, allergies as noted. Physical examination: Patient  well-developed well-nourished, alert oriented in no acute distress.   Exam of involved, appropriate areas of skin performed, including scalp, head were placed in this encounter. Results From Past 48 Hours:  No results found for this or any previous visit (from the past 48 hour(s)).     Meds This Visit:      Imaging Orders:  None     Referral Orders:  No orders of the defined types were placed in

## 2017-10-31 ENCOUNTER — OFFICE VISIT (OUTPATIENT)
Dept: NEUROLOGY | Facility: CLINIC | Age: 52
End: 2017-10-31

## 2017-10-31 VITALS
WEIGHT: 98 LBS | HEART RATE: 83 BPM | DIASTOLIC BLOOD PRESSURE: 62 MMHG | RESPIRATION RATE: 15 BRPM | BODY MASS INDEX: 16 KG/M2 | SYSTOLIC BLOOD PRESSURE: 100 MMHG

## 2017-10-31 DIAGNOSIS — M54.16 LUMBAR RADICULOPATHY: Primary | ICD-10-CM

## 2017-10-31 DIAGNOSIS — M48.061 SPINAL STENOSIS OF LUMBAR REGION WITHOUT NEUROGENIC CLAUDICATION: ICD-10-CM

## 2017-10-31 DIAGNOSIS — M51.9 LUMBAR DISC DISEASE: ICD-10-CM

## 2017-10-31 DIAGNOSIS — M51.26 LUMBAR HERNIATED DISC: ICD-10-CM

## 2017-10-31 DIAGNOSIS — M48.061 LUMBAR FORAMINAL STENOSIS: ICD-10-CM

## 2017-10-31 DIAGNOSIS — M51.36 ANNULAR TEAR OF LUMBAR DISC: ICD-10-CM

## 2017-10-31 DIAGNOSIS — M43.16 SPONDYLOLISTHESIS, LUMBAR REGION: ICD-10-CM

## 2017-10-31 PROCEDURE — 99214 OFFICE O/P EST MOD 30 MIN: CPT | Performed by: PHYSICAL MEDICINE & REHABILITATION

## 2017-10-31 NOTE — PATIENT INSTRUCTIONS
As of October 6th 2014, the Drug Enforcement Agency [facility]) is reclassifying all hydrocodone combination medications from Schedule III to Schedule II. This includes medications such as Norco, Vicodin, Lortab, Zohydro, and Vicoprofen.     What this means for y will perform left S1 TFESI(s). The patient will continue with her home exercise program.    The patient does not need any pain medications at this time.     The patient will follow up in 2 months, but the patient will call me 2 weeks after having the inj

## 2017-10-31 NOTE — PROGRESS NOTES
Low Back Pain H & P    Chief Complaint: Patient presents with:  Low Back Pain: pt here for follow up with c/o left side low back and buttocks radiating down the leg that has become more frequent. continues hep and stretching which helps.  LOV 8/14/17      P Knee  1985: OTHER SURGICAL HISTORY      Comment: Devated septum  No date: SPINAL SURGERY - [facility]    Family History   Family History   Problem Relation Age of Onset   • Cancer Father      Stomach cancer   • Hypertension Mother        Social History     Social scoliosis present     Lumbar Spine Palpation:    Spinous Processes: Non-tender for all Spinous Processes   Z-joints: Tender at  left L5-S1   SIJ: Tender at  left SIJ   Piriformis Muscle: Tender at  left Piriformis muscle(s)   Greater Trochanteric Bursa: No

## 2017-11-01 ENCOUNTER — TELEPHONE (OUTPATIENT)
Dept: NEUROLOGY | Facility: CLINIC | Age: 52
End: 2017-11-01

## 2017-11-01 NOTE — TELEPHONE ENCOUNTER
Patient has been scheduled for a left S1 TFESI  on 11/3/17 at the [facility]. Medications and allergies reviewed. Patient informed to hold aspirins, nsaids, blood thinners, vitamins and fish oils 3-7 days prior to procedure.  Patient informed we will need verbal

## 2017-11-01 NOTE — TELEPHONE ENCOUNTER
Called [facility] BS for authorization of approval of left S1 TFESI cpt codes [id number], [id number]. Talked to Bradley Rodrigues.        who states no authorization is required. Reference # [id number]. Will inform Nursing.

## 2017-11-03 ENCOUNTER — OFFICE VISIT (OUTPATIENT)
Dept: SURGERY | Facility: CLINIC | Age: 52
End: 2017-11-03

## 2017-11-03 DIAGNOSIS — M51.26 LUMBAR HERNIATED DISC: ICD-10-CM

## 2017-11-03 DIAGNOSIS — M54.16 LUMBAR RADICULOPATHY: Primary | ICD-10-CM

## 2017-11-03 PROCEDURE — 64483 NJX AA&/STRD TFRM EPI L/S 1: CPT | Performed by: PHYSICAL MEDICINE & REHABILITATION

## 2017-11-03 NOTE — PROCEDURES
[address] [address]    LUMBAR TRANSFORAMINAL   NAME:  Haroldo Allen    MR #:    [id number] :  3/17/1965     PHYSICIAN:  Anton Clifford        Operative Report    DATE OF PROCEDURE: 11/3/2017   PREOPERATIVE DIAGNOSES: 1. left L5-S1 rad injection of any medication, aspiration was performed. No blood, fluid, or air was aspirated at anytime.

## 2017-11-03 NOTE — PROCEDURES
[address]    LUMBAR TRANSFORAMINAL   NAME:  Mami Morfin    MR #:    [id number] :  3/17/1965     PHYSICIAN:  Chidi Clifford        Operative Report    DATE OF PROCEDURE: 11/3/2017   PREOPERATIVE DIAGNOSES: 1. left L5-S1 rad injection of any medication, aspiration was performed. No blood, fluid, or air was aspirated at anytime.

## 2017-12-06 ENCOUNTER — OFFICE VISIT (OUTPATIENT)
Dept: INTERNAL MEDICINE CLINIC | Facility: CLINIC | Age: 52
End: 2017-12-06

## 2017-12-06 VITALS
TEMPERATURE: 98 F | HEART RATE: 76 BPM | WEIGHT: 97 LBS | DIASTOLIC BLOOD PRESSURE: 76 MMHG | BODY MASS INDEX: 16.16 KG/M2 | HEIGHT: 65 IN | SYSTOLIC BLOOD PRESSURE: 114 MMHG

## 2017-12-06 DIAGNOSIS — N60.12 FIBROCYSTIC BREAST CHANGES OF BOTH BREASTS: ICD-10-CM

## 2017-12-06 DIAGNOSIS — R92.0 MICROCALCIFICATIONS OF THE BREAST: ICD-10-CM

## 2017-12-06 DIAGNOSIS — Z00.00 PHYSICAL EXAM: Primary | ICD-10-CM

## 2017-12-06 DIAGNOSIS — N60.11 FIBROCYSTIC BREAST CHANGES OF BOTH BREASTS: ICD-10-CM

## 2017-12-06 PROCEDURE — 99396 PREV VISIT EST AGE 40-64: CPT | Performed by: INTERNAL MEDICINE

## 2017-12-06 NOTE — PROGRESS NOTES
HPI:    Patient ID: Fátima Medeiros is a 47year old female.     HPI    Physical exam  Feeling well in general   Chronically thin    /76 (BP Location: Left arm, Patient Position: Sitting, Cuff Size: adult)   Pulse 76   Temp (!) 97.5 °F (36.4 °C) (Or History:  No date: ARTHROSCOPY OF JOINT UNLISTED  No date: KNEE SURGERY  2005: OTHER SURGICAL HISTORY      Comment: Brain and neck  1983: OTHER SURGICAL HISTORY Left      Comment: Knee  1985: OTHER SURGICAL HISTORY      Comment: Devated septum  No date: SP ASSAY, THYROID STIM HORMONE, FREE T4 (FREE         THYROXINE), LIPID PANEL, CBC, PLATELET; NO         DIFFERENTIAL, COMP METABOLIC PANEL (14), URINE         MICROSCOPIC W REFLEX CULTURE        Medically stable    (N60.11,  N60.12) Fibrocystic breast change

## 2017-12-20 ENCOUNTER — NURSE NAVIGATOR ENCOUNTER (OUTPATIENT)
Dept: HEMATOLOGY/ONCOLOGY | Facility: HOSPITAL | Age: 52
End: 2017-12-20

## 2017-12-20 NOTE — PROGRESS NOTES
Navigator called to patient to discuss breast imaging, as ordered per Dr. Silvana Gates. Message left for patient to call Navigator back.

## 2018-01-10 ENCOUNTER — HOSPITAL ENCOUNTER (OUTPATIENT)
Dept: MAMMOGRAPHY | Facility: HOSPITAL | Age: 53
Discharge: HOME | End: 2018-01-10
Attending: INTERNAL MEDICINE
Payer: COMMERCIAL

## 2018-01-10 DIAGNOSIS — N60.12 FIBROCYSTIC BREAST CHANGES OF BOTH BREASTS: ICD-10-CM

## 2018-01-10 DIAGNOSIS — R92.0 MICROCALCIFICATIONS OF THE BREAST: ICD-10-CM

## 2018-01-10 DIAGNOSIS — N60.11 FIBROCYSTIC BREAST CHANGES OF BOTH BREASTS: ICD-10-CM

## 2018-01-10 PROCEDURE — 77062 BREAST TOMOSYNTHESIS BI: CPT | Performed by: INTERNAL MEDICINE

## 2018-01-10 PROCEDURE — 77066 DX MAMMO INCL CAD BI: CPT | Performed by: INTERNAL MEDICINE

## 2018-02-13 ENCOUNTER — OFFICE VISIT (OUTPATIENT)
Dept: NEUROLOGY | Facility: CLINIC | Age: 53
End: 2018-02-13

## 2018-02-13 VITALS
SYSTOLIC BLOOD PRESSURE: 110 MMHG | HEART RATE: 60 BPM | DIASTOLIC BLOOD PRESSURE: 58 MMHG | BODY MASS INDEX: 17 KG/M2 | WEIGHT: 103 LBS

## 2018-02-13 DIAGNOSIS — M43.16 SPONDYLOLISTHESIS, LUMBAR REGION: ICD-10-CM

## 2018-02-13 DIAGNOSIS — M51.26 LUMBAR HERNIATED DISC: ICD-10-CM

## 2018-02-13 DIAGNOSIS — M51.9 LUMBAR DISC DISEASE: ICD-10-CM

## 2018-02-13 DIAGNOSIS — M54.16 LUMBAR RADICULOPATHY: Primary | ICD-10-CM

## 2018-02-13 DIAGNOSIS — M51.36 ANNULAR TEAR OF LUMBAR DISC: ICD-10-CM

## 2018-02-13 DIAGNOSIS — M48.061 LUMBAR FORAMINAL STENOSIS: ICD-10-CM

## 2018-02-13 DIAGNOSIS — M48.061 SPINAL STENOSIS OF LUMBAR REGION WITHOUT NEUROGENIC CLAUDICATION: ICD-10-CM

## 2018-02-13 PROCEDURE — 99214 OFFICE O/P EST MOD 30 MIN: CPT | Performed by: PHYSICAL MEDICINE & REHABILITATION

## 2018-02-13 NOTE — PROGRESS NOTES
Low Back Pain H & P    Chief Complaint: Patient presents with:  Low Back Pain: patient here for a follow up on lower left side pain that raditates to the butock to the knee lov 11/03/2017      Patient was last seen on 10/31/17 and on 11/3/17, I did a left Smokeless tobacco: Never Used    Alcohol use Yes  0.0 oz/week     Comment: Beer and Wine, Occasionally;     Drug use: No    Sexual activity: Not on file     Other Topics Concern    Caffeine Concern Yes    Comment: Soda, Coffee, 2 cups;      Exercise Yes Extremity:    Light Touch Sensation: Intact in bilateral Lower Extremities   LE Muscle Strength:  All LE strength measurements 5/5   RIGHT plantar reflexes: downward response   LEFT plantar reflexes: downward response   Reflexes: 2+ in bilateral lower extre

## 2018-02-13 NOTE — PATIENT INSTRUCTIONS
As of October 6th 2014, the Drug Enforcement Agency [facility]) is reclassifying all hydrocodone combination medications from Schedule III to Schedule II. This includes medications such as Norco, Vicodin, Lortab, Zohydro, and Vicoprofen.     What this means for y chart.      Plan  She will do 4 sessions of the PT. She will follow up in 6 months. The patient does not need any injections at this time. The patient does not need any pain medications at this time.

## 2018-03-31 ENCOUNTER — NURSE TRIAGE (OUTPATIENT)
Dept: INTERNAL MEDICINE CLINIC | Facility: CLINIC | Age: 53
End: 2018-03-31

## 2018-03-31 ENCOUNTER — TELEPHONE (OUTPATIENT)
Dept: OTHER | Age: 53
End: 2018-03-31

## 2018-03-31 RX ORDER — CIPROFLOXACIN 250 MG/1
250 TABLET, FILM COATED ORAL 2 TIMES DAILY
Qty: 10 TABLET | Refills: 0 | Status: SHIPPED | OUTPATIENT
Start: 2018-03-31 | End: 2018-04-10

## 2018-03-31 RX ORDER — NITROFURANTOIN 25; 75 MG/1; MG/1
100 CAPSULE ORAL 2 TIMES DAILY
Qty: 10 CAPSULE | Refills: 0 | Status: SHIPPED | OUTPATIENT
Start: 2018-03-31 | End: 2018-03-31

## 2018-03-31 NOTE — TELEPHONE ENCOUNTER
Spoke with [facility]-states Macrobid on backorder.     Requesting Rx to be changed to something else (Bactrim or Cipro)    Please advise    Please reply to pool: EM MARIAH Vega MD   to Monik Aviles RN          3/31/18 11:06 AM

## 2018-03-31 NOTE — TELEPHONE ENCOUNTER
Spoke with patient and informed of DW message below. See also acute encounter 3/31/18 - Rx is on backorder and need replacement (separate encounter). Patient aware of medication. Informed Pharmacy should inform when Rx ready for p/u.  Advised to call back i

## 2018-03-31 NOTE — TELEPHONE ENCOUNTER
Action Requested: Summary for Provider     []  Critical Lab, Recommendations Needed  [] Need Additional Advice  [x]   FYI    []   Need Orders  [] Need Medications Sent to Pharmacy  []  Other     SUMMARY: Patient sent to [facility] - Patient states will go to [facility]

## 2018-04-25 ENCOUNTER — TELEPHONE (OUTPATIENT)
Dept: NEUROLOGY | Facility: CLINIC | Age: 53
End: 2018-04-25

## 2018-04-25 DIAGNOSIS — M48.061 LUMBAR FORAMINAL STENOSIS: ICD-10-CM

## 2018-04-25 DIAGNOSIS — M48.061 SPINAL STENOSIS OF LUMBAR REGION WITHOUT NEUROGENIC CLAUDICATION: ICD-10-CM

## 2018-04-25 DIAGNOSIS — M43.16 SPONDYLOLISTHESIS, LUMBAR REGION: ICD-10-CM

## 2018-04-25 DIAGNOSIS — M51.36 ANNULAR TEAR OF LUMBAR DISC: ICD-10-CM

## 2018-04-25 DIAGNOSIS — M51.26 LUMBAR HERNIATED DISC: ICD-10-CM

## 2018-04-25 DIAGNOSIS — M51.9 LUMBAR DISC DISEASE: ICD-10-CM

## 2018-04-25 DIAGNOSIS — M54.16 LUMBAR RADICULOPATHY: Primary | ICD-10-CM

## 2018-04-25 NOTE — TELEPHONE ENCOUNTER
Spoke to [address] [place] [address], advised patient has cancelled multiple PT appointment 9/2017. Last order for PT written at [address] [place] [address] with Dr Santa Obando 2/13/18 and has not done PT yet. Advised would give message to Dr Santa Obando if wants to reorder PT at this time.  PT order pend

## 2018-04-26 ENCOUNTER — APPOINTMENT (OUTPATIENT)
Dept: PHYSICAL THERAPY | Facility: HOSPITAL | Age: 53
End: 2018-04-26
Attending: PHYSICAL MEDICINE & REHABILITATION
Payer: COMMERCIAL

## 2018-04-27 ENCOUNTER — TELEPHONE (OUTPATIENT)
Dept: NEUROLOGY | Facility: CLINIC | Age: 53
End: 2018-04-27

## 2018-04-27 NOTE — TELEPHONE ENCOUNTER
Called Pt to inform of the approval of PT for the first initial evaluation. Left message for Pt to call back with any questions or concerns.

## 2018-04-27 NOTE — TELEPHONE ENCOUNTER
Called patient to advise insurance was verified and PT is a covered benefit and does not require authorization

## 2018-05-08 ENCOUNTER — APPOINTMENT (OUTPATIENT)
Dept: PHYSICAL THERAPY | Facility: HOSPITAL | Age: 53
End: 2018-05-08
Attending: PHYSICAL MEDICINE & REHABILITATION
Payer: COMMERCIAL

## 2018-05-10 ENCOUNTER — OFFICE VISIT (OUTPATIENT)
Dept: PHYSICAL THERAPY | Facility: HOSPITAL | Age: 53
End: 2018-05-10
Attending: PHYSICAL MEDICINE & REHABILITATION
Payer: COMMERCIAL

## 2018-05-10 PROCEDURE — 97161 PT EVAL LOW COMPLEX 20 MIN: CPT

## 2018-05-10 PROCEDURE — 97110 THERAPEUTIC EXERCISES: CPT

## 2018-05-10 PROCEDURE — 97140 MANUAL THERAPY 1/> REGIONS: CPT

## 2018-05-10 NOTE — PROGRESS NOTES
LUMBAR SPINE EVALUATION:   Nael Castro    3/17/1965  Referring Physician:  Jonel Clifford  Diagnosis: Lumbar radiculopathy (M54.16)  Lumbar herniated disc (M51.26)  Lumbar disc disease (M51.9)  Annular tear of lumbar disc (M51.36)  Spondylolisthesi w L rib hump, L shoulder elevated, head in R tilt; L post ileum, L sup pubis; decreased PA mobility throughout thoracic and lumbar spine; mild (+) L LE neural mobility limitation; decreased L>R hip control for single leg squat; and decreased lumbar ROM; de Abdominals: good tone and activation, min diastasis 1-2 finger  Lumbopelvic control: fair due to spinal stiffness  Body Mechanics: good demonstrated while reaching for personal belongings.   Gait: decreased trunk rotation and lat mobility  Pelvis: L post for 1x/week decreasing to every other week for a total of 6 visits over a 90 day period for therapeutic exercises, posture retraining, therapeutic activities, manual treatment, neuromuscular reeducation, therapeutic pain neuroscience education, patient edu

## 2018-05-18 ENCOUNTER — OFFICE VISIT (OUTPATIENT)
Dept: PHYSICAL THERAPY | Facility: HOSPITAL | Age: 53
End: 2018-05-18
Attending: PHYSICAL MEDICINE & REHABILITATION
Payer: COMMERCIAL

## 2018-05-18 PROCEDURE — 97110 THERAPEUTIC EXERCISES: CPT

## 2018-05-18 NOTE — PROGRESS NOTES
Arnoldo Garcia    3/17/1965  Referring Physician:  Earl Clifford  Diagnosis: Lumbar radiculopathy (M54.16)  Lumbar herniated disc (M51.26)  Lumbar disc disease (M51.9)  Annular tear of lumbar disc (M51.36)  Spondylolisthesis, lumbar region (M43.16)  L fwd walk for gluts   X   Prone  on elbows H  X   Man Tx 1. MET H 1. L sup pubis, L post ileum    H = HEP. Pt given copies of this exercise for home program.  X = Exercises done this date - pt verbalized understanding and demonstrated competence.  All exerci avoidance behavior. Posture:  L shoulder elevated, head in R tilt, L lumbar scoliosis w L rib hump  Palpation: TTP L SI joint  Sensation: Intact to light touch of the LE dermatomes while in sitting.    Abdominals: good tone and activation, min diastasis

## 2018-05-22 ENCOUNTER — TELEPHONE (OUTPATIENT)
Dept: PHYSICAL THERAPY | Facility: HOSPITAL | Age: 53
End: 2018-05-22

## 2018-05-22 NOTE — TELEPHONE ENCOUNTER
----- Message from Brandon Caballero sent at 5/22/2018  8:37 AM CDT -----  Regarding: carlton appt 5/23  She has a work conflict

## 2018-05-23 ENCOUNTER — APPOINTMENT (OUTPATIENT)
Dept: PHYSICAL THERAPY | Facility: HOSPITAL | Age: 53
End: 2018-05-23
Attending: PHYSICAL MEDICINE & REHABILITATION
Payer: COMMERCIAL

## 2018-06-01 ENCOUNTER — OFFICE VISIT (OUTPATIENT)
Dept: PHYSICAL THERAPY | Facility: HOSPITAL | Age: 53
End: 2018-06-01
Attending: PHYSICAL MEDICINE & REHABILITATION
Payer: COMMERCIAL

## 2018-06-01 PROCEDURE — 97110 THERAPEUTIC EXERCISES: CPT

## 2018-06-01 PROCEDURE — 97530 THERAPEUTIC ACTIVITIES: CPT

## 2018-06-01 NOTE — PROGRESS NOTES
Soledad Vernon    3/17/1965  Referring Physician:  Alonso Clifford  Diagnosis: Lumbar radiculopathy (M54.16)  Lumbar herniated disc (M51.26)  Lumbar disc disease (M51.9)  Annular tear of lumbar disc (M51.36)  Spondylolisthesis, lumbar region (M43.16)  L blue     Hip IR H X     Standing Resisted fwd walk for gluts   X X    Trunk extn   X X    Backwards walk on treadmill    X .8 mph,3 deg up     Step up w march    X 6\" w mirror    Squats     X w mirror    Single leg squats    X w mirror   Prone  on elbows increased symptoms. 4. Pt will be able to perform yard work including weeding w good body mechanics without increased symptoms. 5. Pt will be able to reach for dishes in upper kitchen cabinets.       PLAN OF CARE:        Continue PT per original plan for ankle DF R 5   Ankle DF L 5   L5 – EHL R 5   EHL L 5   S1 –ankle PF R 5   Ankle PF L 5   Hams R 5   Hams L 5   Hip abd R 4+   Hip abd L 4+   *pain limiting    LE flexibility 5/10/2018   Piriformis L wnl   Piriformis R wnl   Hams L -10   Hams R -8   Hip fle

## 2018-06-07 ENCOUNTER — OFFICE VISIT (OUTPATIENT)
Dept: PHYSICAL THERAPY | Facility: HOSPITAL | Age: 53
End: 2018-06-07
Attending: PHYSICAL MEDICINE & REHABILITATION
Payer: COMMERCIAL

## 2018-06-07 PROCEDURE — 97530 THERAPEUTIC ACTIVITIES: CPT

## 2018-06-07 PROCEDURE — 97110 THERAPEUTIC EXERCISES: CPT

## 2018-06-07 NOTE — PROGRESS NOTES
Nael Castro    3/17/1965  Referring Physician:  Jonel Clifford  Diagnosis: Lumbar radiculopathy (M54.16)  Lumbar herniated disc (M51.26)  Lumbar disc disease (M51.9)  Annular tear of lumbar disc (M51.36)  Spondylolisthesis, lumbar region (M43.16)  L up     x    Lumbar extn over roll    X Nova 5 min X   Sitting Hip ER  X       Hip ER w TB H  X blue      Hip IR H X      Standing Resisted fwd walk for gluts   X X     Trunk extn   X X     Backwards walk on treadmill    X .8 mph,3 deg up      Step up w mar taught. Physical Therapy Goals:    1. Pt will be independent in beginning level of HEP for stretching, posture and strengthening. 2. 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   3. Pt will be able to lift t * pain limiting  ^follows scoliosis    ROM Hip 5/10/2018   Flex R 135   Flex L 133   IR R 29   IR L 33   ER R 53   ER L 50   Ext R wnl   Ext L wnl   Abd R wnl   Abd L wnl   *pain limiting     MMT 5/5 5/10/2018   L2- hip flex R 5   Hip flex L 5   L3- knee

## 2018-06-18 ENCOUNTER — OFFICE VISIT (OUTPATIENT)
Dept: PHYSICAL THERAPY | Facility: HOSPITAL | Age: 53
End: 2018-06-18
Attending: PHYSICAL MEDICINE & REHABILITATION
Payer: COMMERCIAL

## 2018-06-18 PROCEDURE — 97110 THERAPEUTIC EXERCISES: CPT

## 2018-06-18 PROCEDURE — 97530 THERAPEUTIC ACTIVITIES: CPT

## 2018-06-18 NOTE — PROGRESS NOTES
Laila Schaffer    3/17/1965  Referring Physician:  Remi Clifford  Diagnosis: Lumbar radiculopathy (M54.16)  Lumbar herniated disc (M51.26)  Lumbar disc disease (M51.9)  Annular tear of lumbar disc (M51.36)  Spondylolisthesis, lumbar region (M43.16)  L hams X     Crossed leg LTR H X  X     DKC H X  X     TA set  X       TA skc  X       TA dkc  X       TA pistoning H X  X     TA pistoning arms up    x     Lumbar extn over roll   X Nova 5 min X    Sitting Hip ER     X    Hip ER w TB H X blue   X    Hip IR Pt will be independent in beginning level of HEP for stretching, posture and strengthening. 2. 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   3. Pt will be able to lift two gallons of water = 16# without increased Hip 5/10/2018   Flex R 135   Flex L 133   IR R 29   IR L 33   ER R 53   ER L 50   Ext R wnl   Ext L wnl   Abd R wnl   Abd L wnl   *pain limiting     MMT 5/5 5/10/2018   L2- hip flex R 5   Hip flex L 5   L3- knee ext R 5   Knee ext L 5   L4  ankle DF R 5

## 2018-07-09 ENCOUNTER — APPOINTMENT (OUTPATIENT)
Dept: PHYSICAL THERAPY | Facility: HOSPITAL | Age: 53
End: 2018-07-09
Attending: PHYSICAL MEDICINE & REHABILITATION
Payer: COMMERCIAL

## 2018-07-11 ENCOUNTER — OFFICE VISIT (OUTPATIENT)
Dept: PHYSICAL THERAPY | Facility: HOSPITAL | Age: 53
End: 2018-07-11
Attending: PHYSICAL MEDICINE & REHABILITATION
Payer: COMMERCIAL

## 2018-07-11 PROCEDURE — 97110 THERAPEUTIC EXERCISES: CPT

## 2018-07-11 NOTE — PROGRESS NOTES
Louisnaty     3/17/1965  Referring Physician:  Mayi Clifford  Diagnosis: Lumbar radiculopathy (M54.16)  Lumbar herniated disc (M51.26)  Lumbar disc disease (M51.9)  Annular tear of lumbar disc (M51.36)  Spondylolisthesis, lumbar region (M43.16)  L up   x      Lumbar extn over roll  X Nova 5 min X     Sitting Hip ER    X     Hip ER w TB H   X     Hip IR H   X     Neural flossing B LE H   X X    Slump neural glides H   X X    Forward bend H   X X    Trunk rotation w chair pull H    X   Standing Resist stretching, posture and strengthening. 2. 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 3. Pt will be able to lift two gallons of water = 16# without increased symptoms.   4. Pt will be able to perform yard work in 33   ER R 53   ER L 50   Ext R wnl   Ext L wnl   Abd R wnl   Abd L wnl   *pain limiting     MMT 5/5 5/10/2018   L2- hip flex R 5   Hip flex L 5   L3- knee ext R 5   Knee ext L 5   L4  ankle DF R 5   Ankle DF L 5   L5 – EHL R 5   EHL L 5   S1 –ankle PF R 5

## 2018-07-20 ENCOUNTER — OFFICE VISIT (OUTPATIENT)
Dept: DERMATOLOGY CLINIC | Facility: CLINIC | Age: 53
End: 2018-07-20
Payer: COMMERCIAL

## 2018-07-20 DIAGNOSIS — M67.40 GANGLION: Primary | ICD-10-CM

## 2018-07-20 DIAGNOSIS — D23.9 BENIGN NEOPLASM OF SKIN, UNSPECIFIED LOCATION: ICD-10-CM

## 2018-07-20 DIAGNOSIS — B07.8 OTHER VIRAL WARTS: ICD-10-CM

## 2018-07-20 PROCEDURE — 99212 OFFICE O/P EST SF 10 MIN: CPT | Performed by: DERMATOLOGY

## 2018-07-20 PROCEDURE — 99213 OFFICE O/P EST LOW 20 MIN: CPT | Performed by: DERMATOLOGY

## 2018-07-20 RX ORDER — FLUOROURACIL 50 MG/G
CREAM TOPICAL
Qty: 40 G | Refills: 1 | Status: SHIPPED | OUTPATIENT
Start: 2018-07-20 | End: 2018-08-08

## 2018-07-30 NOTE — PROGRESS NOTES
Venus Marti is a 47year old female.     Patient presents with:  Warts: LOV 10/20/17 pt was seen for recurrent wart on her left thumb here for f/u wart remains has treated with Compound W would like to have it looked at see if any further treatment of children: N/A     Occupational History  None on file     Social History Main Topics   Smoking status: Never Smoker    Smokeless tobacco: Never Used    Alcohol use Yes  0.0 oz/week     Comment: Beer and Wine, Occasionally;     Drug use: No    Sexual acti abdomen, arms, legs, palms. Remarkable for lesions as noted   Verrucous papule at left thumb 2-3 mm papules clustered at proximal nail fold, 5 mm in aggregate toward IP joint. Nail dystrophy remains. No other areas.   Cherry angioma at left parietal scal warts  (primary encounter diagnosis)    No orders of the defined types were placed in this encounter. Results From Past 48 Hours:  No results found for this or any previous visit (from the past 48 hour(s)).     Meds This Visit:      Imaging Orders:  No

## 2018-08-08 ENCOUNTER — NURSE TRIAGE (OUTPATIENT)
Dept: INTERNAL MEDICINE CLINIC | Facility: CLINIC | Age: 53
End: 2018-08-08

## 2018-08-08 ENCOUNTER — OFFICE VISIT (OUTPATIENT)
Dept: INTERNAL MEDICINE CLINIC | Facility: CLINIC | Age: 53
End: 2018-08-08
Payer: COMMERCIAL

## 2018-08-08 VITALS
HEART RATE: 80 BPM | BODY MASS INDEX: 17.66 KG/M2 | TEMPERATURE: 98 F | HEIGHT: 65 IN | SYSTOLIC BLOOD PRESSURE: 144 MMHG | DIASTOLIC BLOOD PRESSURE: 88 MMHG | WEIGHT: 106 LBS

## 2018-08-08 DIAGNOSIS — H93.8X2 CONGESTION OF LEFT EAR: Primary | ICD-10-CM

## 2018-08-08 PROCEDURE — 99212 OFFICE O/P EST SF 10 MIN: CPT | Performed by: INTERNAL MEDICINE

## 2018-08-08 PROCEDURE — 99213 OFFICE O/P EST LOW 20 MIN: CPT | Performed by: INTERNAL MEDICINE

## 2018-08-08 RX ORDER — HYDROCORTISONE AND ACETIC ACID 1.1; 2.41 G/100ML; G/100ML
3 SOLUTION AURICULAR (OTIC) 2 TIMES DAILY
Qty: 1 BOTTLE | Refills: 0 | Status: SHIPPED | OUTPATIENT
Start: 2018-08-08 | End: 2018-08-27 | Stop reason: ALTCHOICE

## 2018-08-08 NOTE — PROGRESS NOTES
Patient ID: Amaury Abreu is a 47year old female. Patient presents with:  Ear Pain: left ear pain x3 weeks, noticed some bleeding yesterday.  Per patient \"feels like there is something in there\"        HISTORY OF PRESENT ILLNESS:   HPI  Patient pre Spouse name: N/A    Years of education: N/A  Number of children: N/A     Occupational History  None on file     Social History Main Topics   Smoking status: Never Smoker    Smokeless tobacco: Never Used    Alcohol use Yes  0.0 oz/week     Comment: Beer and adenopathy present. ASSESSMENT/PLAN:   1. Congestion of left ear  · ENT - INTERNAL  · Hydrocortisone-Acetic Acid 1-2 % Otic Solution; Place 3 drops into the left ear 2 (two) times daily. Dispense: 1 Bottle;  Refill: 0  · Blood in canal most likely

## 2018-08-08 NOTE — TELEPHONE ENCOUNTER
Action Requested: Summary for Provider     []  Critical Lab, Recommendations Needed  [] Need Additional Advice  []   FYI    []   Need Orders  [] Need Medications Sent to Pharmacy  []  Other     SUMMARY: Per protocol advised OV today and pt scheduled with A

## 2018-08-10 ENCOUNTER — OFFICE VISIT (OUTPATIENT)
Dept: AUDIOLOGY | Facility: CLINIC | Age: 53
End: 2018-08-10
Payer: COMMERCIAL

## 2018-08-10 ENCOUNTER — OFFICE VISIT (OUTPATIENT)
Dept: OTOLARYNGOLOGY | Facility: CLINIC | Age: 53
End: 2018-08-10
Payer: COMMERCIAL

## 2018-08-10 VITALS
BODY MASS INDEX: 17.66 KG/M2 | TEMPERATURE: 98 F | HEIGHT: 65 IN | SYSTOLIC BLOOD PRESSURE: 122 MMHG | DIASTOLIC BLOOD PRESSURE: 83 MMHG | WEIGHT: 106 LBS

## 2018-08-10 DIAGNOSIS — H91.22 SUDDEN IDIOPATHIC HEARING LOSS OF LEFT EAR WITH UNRESTRICTED HEARING OF RIGHT EAR: Primary | ICD-10-CM

## 2018-08-10 DIAGNOSIS — IMO0001 ASYMMETRICAL HEARING LOSS OF LEFT EAR: Primary | ICD-10-CM

## 2018-08-10 PROCEDURE — 92557 COMPREHENSIVE HEARING TEST: CPT | Performed by: AUDIOLOGIST

## 2018-08-10 PROCEDURE — 92567 TYMPANOMETRY: CPT | Performed by: AUDIOLOGIST

## 2018-08-10 PROCEDURE — 99212 OFFICE O/P EST SF 10 MIN: CPT | Performed by: OTOLARYNGOLOGY

## 2018-08-10 PROCEDURE — 99243 OFF/OP CNSLTJ NEW/EST LOW 30: CPT | Performed by: OTOLARYNGOLOGY

## 2018-08-10 NOTE — PROGRESS NOTES
Laureano Dyer is a 47year old female. Patient presents with:  Ear Problem: pain in left ear for 3 wks, blood drainage, referred Dr. Miguel Angel Thornton    HPI:   She has been experiencing a pressure and fullness in her left ear for the last 3-4 weeks.   There is bee septum - Normal, Turbinates - Normal   Neurological Normal Memory - Normal. Cranial nerves - Cranial nerves II through XII grossly intact.    Neck Exam Normal Inspection - Normal. Palpation - Normal. Parotid gland - Normal. Thyroid gland - Normal.   Psychia

## 2018-08-10 NOTE — PROGRESS NOTES
AUDIOGRAM     Kevin Méndez was referred for testing by Veronica Beltrán V.   3/17/1965  [id number]    PT was seen due to sudden left hearing loss and left fullness.     Otoscopic Inspection:  Right ear:  No cerumen  Left ear:  No cerumen    Audiometric Te

## 2018-08-15 ENCOUNTER — OFFICE VISIT (OUTPATIENT)
Dept: PHYSICAL THERAPY | Facility: HOSPITAL | Age: 53
End: 2018-08-15
Attending: PHYSICAL MEDICINE & REHABILITATION
Payer: COMMERCIAL

## 2018-08-15 PROCEDURE — 97110 THERAPEUTIC EXERCISES: CPT

## 2018-08-15 NOTE — PROGRESS NOTES
Discharge Summary    Pt has attended 7, cancelled 0, and no shown 0 visits in Physical Therapy.        Mami Morfin    3/17/1965  Referring Physician:  Kaden Clifford  Diagnosis: Lumbar radiculopathy (M54.16)  Lumbar herniated disc (M51.26)  Lumbar disc 2-3/10 2/10 2/10 2/10 2/10      OBJECTIVE:       Therapeutic Exercise:  Position Exercise HEP 6/7/2018  6/18/2018  7/11/2018  8/15/2018    Supine Ilial self mobilization L post H X  X w R ant     Leg pumps H        Leg pumps w ankle pump focus  X  X X    C therapist who has craniosacral training who would be sensitive to her Chiari hx if she desire to have massage therapy. Pt demonstrates good improvement in her ROM and strength as noted below.   She is consistent with her TB exercises in fwd/back walking an Mechanics: good demonstrated while reaching for personal belongings. Gait: decreased trunk rotation and lat mobility  Pelvis: L post ileum, L sup pubis. 8/15/2018 Good B ilial mobility.   Lumbar: decreased PA mobility throughout lumbar and thoracic spin

## 2018-08-27 ENCOUNTER — OFFICE VISIT (OUTPATIENT)
Dept: OTOLARYNGOLOGY | Facility: CLINIC | Age: 53
End: 2018-08-27
Payer: COMMERCIAL

## 2018-08-27 ENCOUNTER — OFFICE VISIT (OUTPATIENT)
Dept: NEUROLOGY | Facility: CLINIC | Age: 53
End: 2018-08-27
Payer: COMMERCIAL

## 2018-08-27 ENCOUNTER — OFFICE VISIT (OUTPATIENT)
Dept: AUDIOLOGY | Facility: CLINIC | Age: 53
End: 2018-08-27
Payer: COMMERCIAL

## 2018-08-27 VITALS — RESPIRATION RATE: 15 BRPM | HEART RATE: 58 BPM | SYSTOLIC BLOOD PRESSURE: 104 MMHG | DIASTOLIC BLOOD PRESSURE: 64 MMHG

## 2018-08-27 VITALS
WEIGHT: 106 LBS | BODY MASS INDEX: 17.66 KG/M2 | HEIGHT: 65 IN | DIASTOLIC BLOOD PRESSURE: 60 MMHG | SYSTOLIC BLOOD PRESSURE: 90 MMHG | TEMPERATURE: 98 F

## 2018-08-27 DIAGNOSIS — M48.061 LUMBAR FORAMINAL STENOSIS: ICD-10-CM

## 2018-08-27 DIAGNOSIS — M48.061 SPINAL STENOSIS OF LUMBAR REGION WITHOUT NEUROGENIC CLAUDICATION: ICD-10-CM

## 2018-08-27 DIAGNOSIS — M54.16 LUMBAR RADICULOPATHY: Primary | ICD-10-CM

## 2018-08-27 DIAGNOSIS — M51.9 LUMBAR DISC DISEASE: ICD-10-CM

## 2018-08-27 DIAGNOSIS — M54.12 CERVICAL RADICULITIS: ICD-10-CM

## 2018-08-27 DIAGNOSIS — M51.26 LUMBAR HERNIATED DISC: ICD-10-CM

## 2018-08-27 DIAGNOSIS — M43.16 SPONDYLOLISTHESIS, LUMBAR REGION: ICD-10-CM

## 2018-08-27 DIAGNOSIS — H91.22 SUDDEN IDIOPATHIC HEARING LOSS OF LEFT EAR WITH UNRESTRICTED HEARING OF RIGHT EAR: Primary | ICD-10-CM

## 2018-08-27 DIAGNOSIS — IMO0001 ASYMMETRICAL HEARING LOSS OF LEFT EAR: Primary | ICD-10-CM

## 2018-08-27 DIAGNOSIS — M54.2 NECK PAIN ON LEFT SIDE: ICD-10-CM

## 2018-08-27 DIAGNOSIS — M51.36 ANNULAR TEAR OF LUMBAR DISC: ICD-10-CM

## 2018-08-27 PROCEDURE — 92567 TYMPANOMETRY: CPT | Performed by: AUDIOLOGIST

## 2018-08-27 PROCEDURE — 99212 OFFICE O/P EST SF 10 MIN: CPT | Performed by: OTOLARYNGOLOGY

## 2018-08-27 PROCEDURE — 99213 OFFICE O/P EST LOW 20 MIN: CPT | Performed by: OTOLARYNGOLOGY

## 2018-08-27 PROCEDURE — 92557 COMPREHENSIVE HEARING TEST: CPT | Performed by: AUDIOLOGIST

## 2018-08-27 PROCEDURE — 99214 OFFICE O/P EST MOD 30 MIN: CPT | Performed by: PHYSICAL MEDICINE & REHABILITATION

## 2018-08-27 NOTE — PROGRESS NOTES
Low Back Pain H & P    Chief Complaint: Patient presents with:  Low Back Pain: pt here for follow up with c/o low back pain radiating down the left leg. pain is worse with bending/twisting.  completed physical therapy which was very helpful, continues hep L better with HEP. · There is no numbness. · There is no tingling in the legs. · There is not weakness in both legs.      Past Medical History   Past Medical History:   Diagnosis Date   • Allergic rhinitis    • Allergy    • Back problem    • Seizure disord patient is well groomed. Psychiatric:  The patient is alert and oriented x 3. The patient has a normal affect and mood. Respiratory:  No acute respiratory distress. Patient does not have a cough. HEENT:  Extraocular muscles are intact.  There is muscles   Greater Trochanteric Bursa: Non-tender for bilateral Greater Trochanteric Bursa     Vascular lower extremity:   Dorsalis pedis pulse-RIGHT 2+   Dorsalis pedis pulse-LEFT 2+   Tibialis posterior pulse-RIGHT 2+   Tibialis posterior pulse-LEFT 2+

## 2018-08-27 NOTE — PATIENT INSTRUCTIONS
As of October 6th 2014, the Drug Enforcement Agency [facility]) is reclassifying all hydrocodone combination medications from Schedule III to Schedule II. This includes medications such as Norco, Vicodin, Lortab, Zohydro, and Vicoprofen.     What this means for y will resume her home exercise program for the cervical spine. She will continue with the home exercise program for the lumbar spine.     She will call me in one month and if she is not getting any better, then I will have her get a new MRI of the cervica

## 2018-08-28 NOTE — PROGRESS NOTES
AUDIOGRAM     Aimee Nava was referred for testing by Dr Antonino Marquez. 3/17/1965  [id number]    History: 8/10/18  Audiometric Test Results: Audiometric thresholds indicated normal hearing in the right ear. .  Audiometric thresholds indicated a mild sens

## 2018-08-28 NOTE — PROGRESS NOTES
Coby Vee is a 47year old female. Patient presents with: Follow - Up: 2 week follow up-asymmetrical hearing loss of left ear- per pt there is some changes/improvement of symptoms    HPI:   She is in follow-up of an asymmetric hearing loss.   She d & situation. Appropriate mood and affect. Lymph Detail Normal Submental. Submandibular. Anterior cervical. Posterior cervical. Supraclavicular.    Eyes Normal Conjunctiva - Right: Normal, Left: Normal. Pupil - Right: Normal, Left: Normal.    Ears Normal I

## 2018-10-29 ENCOUNTER — TELEPHONE (OUTPATIENT)
Dept: NEUROLOGY | Facility: CLINIC | Age: 53
End: 2018-10-29

## 2018-10-29 DIAGNOSIS — M54.12 CERVICAL RADICULOPATHY: Primary | ICD-10-CM

## 2018-10-29 NOTE — TELEPHONE ENCOUNTER
Davis Baez pt is to have repeat cervical MRI and pt to make appointment to discuss further interventions for neck problems. Order pending for MRI of cervical spine.

## 2018-10-29 NOTE — TELEPHONE ENCOUNTER
Pt called stated that since last visit pt has had increased headache. Pain that starts at left base of skull. Pt having ringing in ears which she stated ENT checked out and found no problems.  Pt stated last cervical MRI 2013 and Dr Ysabel Hyman discussed repeatin

## 2018-10-31 ENCOUNTER — TELEPHONE (OUTPATIENT)
Dept: NEUROLOGY | Facility: CLINIC | Age: 53
End: 2018-10-31

## 2018-10-31 NOTE — TELEPHONE ENCOUNTER
[employer] for authorization of approval for MRI C-spine wo  Approval was given with   Authorization # [id number] effective 10/31/18 to 11/29/18. Will call Pt. To inform. Pt. Informed of approval. Transferred call to scheduling for appt.

## 2018-11-06 ENCOUNTER — HOSPITAL ENCOUNTER (OUTPATIENT)
Dept: MRI IMAGING | Age: 53
Discharge: HOME | End: 2018-11-06
Attending: PHYSICAL MEDICINE & REHABILITATION
Payer: COMMERCIAL

## 2018-11-06 DIAGNOSIS — M54.12 CERVICAL RADICULOPATHY: ICD-10-CM

## 2018-11-06 PROCEDURE — 72141 MRI NECK SPINE W/O DYE: CPT | Performed by: PHYSICAL MEDICINE & REHABILITATION

## 2018-11-17 ENCOUNTER — NURSE TRIAGE (OUTPATIENT)
Dept: OTHER | Age: 53
End: 2018-11-17

## 2018-11-17 ENCOUNTER — HOSPITAL ENCOUNTER (OUTPATIENT)
Age: 53
Discharge: HOME | End: 2018-11-17
Attending: EMERGENCY MEDICINE
Payer: COMMERCIAL

## 2018-11-17 VITALS
BODY MASS INDEX: 17 KG/M2 | OXYGEN SATURATION: 98 % | TEMPERATURE: 99 F | DIASTOLIC BLOOD PRESSURE: 83 MMHG | WEIGHT: 105 LBS | HEART RATE: 98 BPM | SYSTOLIC BLOOD PRESSURE: 120 MMHG | RESPIRATION RATE: 18 BRPM

## 2018-11-17 DIAGNOSIS — J03.00 STREPTOCOCCAL TONSILLITIS: Primary | ICD-10-CM

## 2018-11-17 PROCEDURE — 99214 OFFICE O/P EST MOD 30 MIN: CPT

## 2018-11-17 PROCEDURE — 96372 THER/PROPH/DIAG INJ SC/IM: CPT

## 2018-11-17 PROCEDURE — 87430 STREP A AG IA: CPT

## 2018-11-17 RX ORDER — IBUPROFEN 600 MG/1
600 TABLET ORAL ONCE
Status: COMPLETED | OUTPATIENT
Start: 2018-11-17 | End: 2018-11-17

## 2018-11-17 RX ORDER — FLUTICASONE PROPIONATE 50 MCG
2 SPRAY, SUSPENSION (ML) NASAL DAILY
Qty: 16 G | Refills: 0 | Status: SHIPPED | OUTPATIENT
Start: 2018-11-17 | End: 2018-12-07

## 2018-11-17 RX ORDER — FEXOFENADINE HYDROCHLORIDE 60 MG/1
60 TABLET, FILM COATED ORAL 2 TIMES DAILY
Qty: 30 TABLET | Refills: 0 | Status: SHIPPED | OUTPATIENT
Start: 2018-11-17 | End: 2018-12-07

## 2018-11-17 NOTE — ED NOTES
IM as documented motrin po 600 mg for pain and fever. Increase po fluids fever care reviewed wash hands cover mouth when coughing, rest call pcp in office Monday for follow up as directed. Observe for 20 min then D/C.

## 2018-11-17 NOTE — ED INITIAL ASSESSMENT (HPI)
Cough cold nasal congestion on and off for 3 weeks- worse the last 24 hours took some Motrin yesterday.  No ful shot

## 2018-11-17 NOTE — TELEPHONE ENCOUNTER
Action Requested: Summary for Provider     []  Critical Lab, Recommendations Needed  [] Need Additional Advice  []   FYI    []   Need Orders  [] Need Medications Sent to Pharmacy  []  Other     SUMMARY: Patient states since Thursday has severe sore throat,

## 2018-11-17 NOTE — ED PROVIDER NOTES
Patient Seen in: [facility] In [address] [place] [address]    History   Patient presents with:  Cough/URI    Stated Complaint: sore throat,sinus,ear ache    HPI    Patient here complaining of sore throat for 3-4 days.   Notes pain is described as like are as noted in HPI. Constitutional and vital signs reviewed. All other systems reviewed and negative except as noted above. PSFH elements reviewed from today and agreed except as otherwise stated in HPI.     Physical Exam     ED Triage Vitals [11/ 0

## 2018-11-17 NOTE — ED NOTES
Increase po fluids rest motrin or tylenol for fever aches and pains fill and take po meds as directed. Wash hands cover mouth. when coughing. Call and follow up with pcp in office  If not feeling better.  Go to the ED for chest pain shortness of breath feve

## 2018-12-07 ENCOUNTER — OFFICE VISIT (OUTPATIENT)
Dept: INTERNAL MEDICINE CLINIC | Facility: CLINIC | Age: 53
End: 2018-12-07
Payer: COMMERCIAL

## 2018-12-07 VITALS
DIASTOLIC BLOOD PRESSURE: 83 MMHG | SYSTOLIC BLOOD PRESSURE: 128 MMHG | HEART RATE: 79 BPM | BODY MASS INDEX: 17.33 KG/M2 | WEIGHT: 104 LBS | HEIGHT: 65 IN

## 2018-12-07 DIAGNOSIS — Z12.31 VISIT FOR SCREENING MAMMOGRAM: ICD-10-CM

## 2018-12-07 DIAGNOSIS — B07.8 OTHER VIRAL WARTS: ICD-10-CM

## 2018-12-07 DIAGNOSIS — Z00.00 ROUTINE GENERAL MEDICAL EXAMINATION AT A HEALTH CARE FACILITY: Primary | ICD-10-CM

## 2018-12-07 DIAGNOSIS — I73.00 RAYNAUD'S PHENOMENON WITHOUT GANGRENE: ICD-10-CM

## 2018-12-07 PROCEDURE — 99396 PREV VISIT EST AGE 40-64: CPT | Performed by: INTERNAL MEDICINE

## 2018-12-07 RX ORDER — CETIRIZINE HYDROCHLORIDE 10 MG/1
10 TABLET ORAL AS NEEDED
Qty: 100 TABLET | Refills: 3 | Status: SHIPPED | OUTPATIENT
Start: 2018-12-07 | End: 2019-04-29

## 2018-12-17 ENCOUNTER — APPOINTMENT (OUTPATIENT)
Dept: LAB | Age: 53
End: 2018-12-17
Attending: INTERNAL MEDICINE
Payer: COMMERCIAL

## 2018-12-17 DIAGNOSIS — Z00.00 ROUTINE GENERAL MEDICAL EXAMINATION AT A HEALTH CARE FACILITY: ICD-10-CM

## 2018-12-17 PROCEDURE — 80061 LIPID PANEL: CPT

## 2018-12-17 PROCEDURE — 81015 MICROSCOPIC EXAM OF URINE: CPT

## 2018-12-17 PROCEDURE — 83036 HEMOGLOBIN GLYCOSYLATED A1C: CPT

## 2018-12-17 PROCEDURE — 85027 COMPLETE CBC AUTOMATED: CPT

## 2018-12-17 PROCEDURE — 84439 ASSAY OF FREE THYROXINE: CPT

## 2018-12-17 PROCEDURE — 80053 COMPREHEN METABOLIC PANEL: CPT

## 2018-12-17 PROCEDURE — 36415 COLL VENOUS BLD VENIPUNCTURE: CPT

## 2018-12-17 PROCEDURE — 84443 ASSAY THYROID STIM HORMONE: CPT

## 2018-12-17 NOTE — PROGRESS NOTES
HPI:    Patient ID: Vickey Fontenot is a 47year old female.     HPI     Physical examination  Generally healthy    /83 (BP Location: Left arm, Patient Position: Sitting, Cuff Size: adult)   Pulse 79   Ht 5' 5\" (1.651 m)   Wt 104 lb (47.2 kg)   BMI mouth daily.  Disp:  Rfl:      Allergies:No Known Allergies    HISTORY:  Past Medical History:   Diagnosis Date   • Allergic rhinitis    • Allergy    • Back problem    • [facility]    • Seizures ([facility]    • Stroke [facility])       Past Surgical Histor distension and no mass. There is no hepatosplenomegaly. There is no tenderness. No hernia. Musculoskeletal: She exhibits no edema or tenderness. Lymphadenopathy:     She has no cervical adenopathy. Neurological: She is alert. Skin: No rash noted.  Miguel Souza

## 2019-01-10 ENCOUNTER — OFFICE VISIT (OUTPATIENT)
Dept: NEUROLOGY | Facility: CLINIC | Age: 54
End: 2019-01-10
Payer: COMMERCIAL

## 2019-01-10 VITALS — SYSTOLIC BLOOD PRESSURE: 130 MMHG | HEART RATE: 76 BPM | DIASTOLIC BLOOD PRESSURE: 76 MMHG | RESPIRATION RATE: 15 BRPM

## 2019-01-10 DIAGNOSIS — M51.26 LUMBAR HERNIATED DISC: ICD-10-CM

## 2019-01-10 DIAGNOSIS — M43.16 SPONDYLOLISTHESIS, LUMBAR REGION: ICD-10-CM

## 2019-01-10 DIAGNOSIS — M51.36 ANNULAR TEAR OF LUMBAR DISC: ICD-10-CM

## 2019-01-10 DIAGNOSIS — M48.061 LUMBAR FORAMINAL STENOSIS: ICD-10-CM

## 2019-01-10 DIAGNOSIS — M48.061 SPINAL STENOSIS OF LUMBAR REGION WITHOUT NEUROGENIC CLAUDICATION: ICD-10-CM

## 2019-01-10 DIAGNOSIS — M51.9 LUMBAR DISC DISEASE: ICD-10-CM

## 2019-01-10 DIAGNOSIS — M54.16 LUMBAR RADICULOPATHY: Primary | ICD-10-CM

## 2019-01-10 PROCEDURE — 99214 OFFICE O/P EST MOD 30 MIN: CPT | Performed by: PHYSICAL MEDICINE & REHABILITATION

## 2019-01-10 RX ORDER — IBUPROFEN 200 MG
200 TABLET ORAL EVERY 6 HOURS PRN
COMMUNITY
End: 2020-06-09

## 2019-01-10 NOTE — PROGRESS NOTES
Patient has been scheduled for a Left S1 TFESI on 1/25/19 at the [facility]. Medications and allergies reviewed. Patient informed to hold aspirins, nsaids, blood thinners, vitamins and fish oils 3-7 days prior to procedure.  Patient informed we will need verbal o

## 2019-01-10 NOTE — PROGRESS NOTES
Low Back Pain H & P    Chief Complaint: Patient presents with:  Low Back Pain: pt here for follow up with c/o left side low back and buttocks pain that radiates down the left posterior leg. twisting, squatting and lifting legs aggaravates symptoms.  using c Left 6/6/2017    Performed by Shad Shannon MD at [facility] MAIN OR       Family History   Family History   Problem Relation Age of Onset   • Cancer Father         Stomach cancer   • Hypertension Mother        Social History   Social History    Socioeconomic H Deal of Time in Sun: Yes        Bad sunburns in the past: Yes          once        Tanning Salons in the Past: No        Hx of Radiation Treatments: No        Regular use of sun block: Not Asked    Social History Narrative      The patient does not use an mild-mod central stenosis    6. L5-S1 left mild-mod, L3-4 left mild foraminal stenosis    7.  L5-S1 left paracentral mild-mod herniated disc         Plan  She will continue with her home exercise program.    She will plan on having a left S1 TFESI next week

## 2019-01-11 ENCOUNTER — TELEPHONE (OUTPATIENT)
Dept: NEUROLOGY | Facility: CLINIC | Age: 54
End: 2019-01-11

## 2019-01-11 NOTE — TELEPHONE ENCOUNTER
Called [facility] BS for authorization of approval of left S1 TFESI cpt codes [id number], [id number]. Talked to Gay DOWNING   who states no authorization is required. Reference # [id number]. Will inform Nursing.

## 2019-01-14 ENCOUNTER — TELEPHONE (OUTPATIENT)
Dept: OTHER | Age: 54
End: 2019-01-14

## 2019-01-14 NOTE — TELEPHONE ENCOUNTER
Received a call from the pharmacy who reports an rx for the Zyrtec Allergy was received, but patient is requesting an rx for the Zyrtec D. Message routed to provider for review.      Please reply to nikko: RAMAKRISHNA Plunkett Se

## 2019-01-16 NOTE — TELEPHONE ENCOUNTER
More then 1 package a month pharmacy is requesting a script to provide pt with  Ortiz Luque please advsie.

## 2019-01-17 RX ORDER — CETIRIZINE HYDROCHLORIDE, PSEUDOEPHEDRINE HYDROCHLORIDE 5; 120 MG/1; MG/1
1 TABLET, FILM COATED, EXTENDED RELEASE ORAL 2 TIMES DAILY
Qty: 30 TABLET | Refills: 2 | OUTPATIENT
Start: 2019-01-17 | End: 2019-01-17

## 2019-01-17 NOTE — TELEPHONE ENCOUNTER
Script below called into [facility] and spoke with pharmacist: Rosa Moulton        Medication Quantity Refills Start End   Cetirizine-Pseudoephedrine ER (ZYRTEC-D ALLERGY & CONGESTION) 5-120 MG Oral Tablet 12 Hr 24 tablet 3 1/17/2019    Sig :  Take 1 tablet by mouth

## 2019-01-17 NOTE — TELEPHONE ENCOUNTER
Attempted to call in RX for Cetirizine-Pseudoephedrine ER (ZYRTEC-D ALLERGY & CONGESTION) 5-120 MG Oral Tablet 12 Hr  As approved by Dr. Tyler Murray. Was told by pharmacist this medication comes in packages of 12 tablets or 24 tablets.  New rx is needed with ne

## 2019-01-17 NOTE — TELEPHONE ENCOUNTER
Script pended. Please sign and advise.     Please respond to pool: EM [facility] LPN/CMA

## 2019-01-23 NOTE — TELEPHONE ENCOUNTER
S/w who could not decide if she should cancel or continue with injection scheduled for 1/25/18. Pt states she had pain 8/10 during LOV and pain is now 6/10, but still occurs daily and worsens as day goes on.  Pt explained she has had 3 injections in the pas

## 2019-01-25 ENCOUNTER — OFFICE VISIT (OUTPATIENT)
Dept: SURGERY | Facility: CLINIC | Age: 54
End: 2019-01-25
Payer: COMMERCIAL

## 2019-01-25 DIAGNOSIS — M54.16 LUMBAR RADICULOPATHY: Primary | ICD-10-CM

## 2019-01-25 DIAGNOSIS — M51.26 LUMBAR HERNIATED DISC: ICD-10-CM

## 2019-01-25 PROCEDURE — 64483 NJX AA&/STRD TFRM EPI L/S 1: CPT | Performed by: PHYSICAL MEDICINE & REHABILITATION

## 2019-01-25 NOTE — PROCEDURES
Jian Suresh [address].    LUMBAR TRANSFORAMINAL   NAME:  Mansoor Powell    MR #:    [id number] :  3/17/1965     PHYSICIAN:  Brenna Clifford        Operative Report    DATE OF PROCEDURE: 2019   PREOPERATIVE DIAGNOSES: 1. left L5-S1 rad injection of any medication, aspiration was performed. No blood, fluid, or air was aspirated at anytime.

## 2019-01-31 PROBLEM — M50.90 CERVICAL DISC DISEASE: Status: ACTIVE | Noted: 2019-01-31

## 2019-01-31 PROBLEM — M50.20 CERVICAL HERNIATED DISC: Status: ACTIVE | Noted: 2019-01-31

## 2019-01-31 PROBLEM — M48.02 FORAMINAL STENOSIS OF CERVICAL REGION: Status: ACTIVE | Noted: 2019-01-31

## 2019-02-13 ENCOUNTER — TELEPHONE (OUTPATIENT)
Dept: NEUROLOGY | Facility: CLINIC | Age: 54
End: 2019-02-13

## 2019-02-13 NOTE — TELEPHONE ENCOUNTER
Pt called for condition update post Left S1 TFESI on 1/25/19. Pt states she is feeling much better than she was prior to injection. Pt states she feels 90% relief, denies pain medication for breakthrough pain. Does HEP daily.     Rates highest pain 4/10 whe

## 2019-04-19 ENCOUNTER — HOSPITAL ENCOUNTER (OUTPATIENT)
Age: 54
Discharge: HOME | End: 2019-04-19
Attending: EMERGENCY MEDICINE
Payer: COMMERCIAL

## 2019-04-19 ENCOUNTER — APPOINTMENT (OUTPATIENT)
Dept: GENERAL RADIOLOGY | Age: 54
End: 2019-04-19
Attending: EMERGENCY MEDICINE
Payer: COMMERCIAL

## 2019-04-19 VITALS
RESPIRATION RATE: 18 BRPM | OXYGEN SATURATION: 100 % | WEIGHT: 107 LBS | BODY MASS INDEX: 17.83 KG/M2 | SYSTOLIC BLOOD PRESSURE: 130 MMHG | TEMPERATURE: 98 F | DIASTOLIC BLOOD PRESSURE: 78 MMHG | HEIGHT: 65 IN | HEART RATE: 81 BPM

## 2019-04-19 DIAGNOSIS — M79.673 PAIN OF FOOT, UNSPECIFIED LATERALITY: Primary | ICD-10-CM

## 2019-04-19 PROCEDURE — 99213 OFFICE O/P EST LOW 20 MIN: CPT

## 2019-04-19 PROCEDURE — 73630 X-RAY EXAM OF FOOT: CPT | Performed by: EMERGENCY MEDICINE

## 2019-04-19 NOTE — ED PROVIDER NOTES
Patient Seen in: [facility] In [place]    History   Stated Complaint: rt foot inj    HPI    Patient states she struck her right foot about 1 month ago and fell striking her knee.   She has had persistent pain to her fourth and fifth extremity there is no gross deformity erythema or ecchymosis. Positive tenderness on  palpation of the fourth and fifth toes. No tenderness on palpation of the great, second and third toes. Skin is intact.   There is intact dorsal pedal pulse with normal

## 2019-04-29 ENCOUNTER — OFFICE VISIT (OUTPATIENT)
Dept: NEUROLOGY | Facility: CLINIC | Age: 54
End: 2019-04-29
Payer: COMMERCIAL

## 2019-04-29 VITALS — DIASTOLIC BLOOD PRESSURE: 72 MMHG | HEART RATE: 62 BPM | SYSTOLIC BLOOD PRESSURE: 120 MMHG | RESPIRATION RATE: 14 BRPM

## 2019-04-29 DIAGNOSIS — M43.16 SPONDYLOLISTHESIS, LUMBAR REGION: ICD-10-CM

## 2019-04-29 DIAGNOSIS — M51.26 LUMBAR HERNIATED DISC: ICD-10-CM

## 2019-04-29 DIAGNOSIS — M48.061 LUMBAR FORAMINAL STENOSIS: ICD-10-CM

## 2019-04-29 DIAGNOSIS — M51.36 ANNULAR TEAR OF LUMBAR DISC: ICD-10-CM

## 2019-04-29 DIAGNOSIS — M48.061 SPINAL STENOSIS OF LUMBAR REGION WITHOUT NEUROGENIC CLAUDICATION: ICD-10-CM

## 2019-04-29 DIAGNOSIS — M51.9 LUMBAR DISC DISEASE: ICD-10-CM

## 2019-04-29 DIAGNOSIS — M54.16 LUMBAR RADICULOPATHY: Primary | ICD-10-CM

## 2019-04-29 PROCEDURE — 99214 OFFICE O/P EST MOD 30 MIN: CPT | Performed by: PHYSICAL MEDICINE & REHABILITATION

## 2019-04-29 NOTE — PATIENT INSTRUCTIONS
.As of October 6th 2014, the Drug Enforcement Agency [facility]) is reclassifying all hydrocodone combination medications from Schedule III to Schedule II. This includes medications such as Norco, Vicodin, Lortab, Zohydro, and Vicoprofen.     What this means for chart.    Plan  She will get back into 2-3 sessions of the PT to upgrade her home exercise program.    The patient does not need any injections at this time. The patient does not need any pain medications at this time.     The patient will continue with

## 2019-04-29 NOTE — PROGRESS NOTES
Low Back Pain H & P    Chief Complaint: Patient presents with:  Low Back Pain: pt here for follow up after left S1 transforaminal epidural steroid injection 1/25/19 with 85% relief in symptoms.  c/o mild low back pain radiating down the left leg increasing of children: Not on file      Years of education: Not on file      Highest education level: Not on file    Occupational History      Not on file    Social Needs      Financial resource strain: Not on file      Food insecurity:        Worry: Not on file No        Pt has a defibrillator: No        Breast feeding: Not Asked        Reaction to local anesthetic: No        Left Handed: No        Right Handed: Yes        Currently spends a great deal of time in the sun: No        Past Sunlamp Treatments for Acn downward response   LEFT plantar reflexes: downward response   Reflexes: 2+ in bilateral lower extremities     Assessment  1. left L5-S1 radiculitis    2. L3-4 grade 1 spondylolisthesis    3. L3-4 mild-mod diffuse bulging disc    4.  L4-5 mild-mod central b

## 2019-05-03 ENCOUNTER — HOSPITAL ENCOUNTER (OUTPATIENT)
Dept: MAMMOGRAPHY | Age: 54
Discharge: HOME | End: 2019-05-03
Attending: INTERNAL MEDICINE
Payer: COMMERCIAL

## 2019-05-03 DIAGNOSIS — Z12.31 VISIT FOR SCREENING MAMMOGRAM: ICD-10-CM

## 2019-05-03 PROCEDURE — 77063 BREAST TOMOSYNTHESIS BI: CPT | Performed by: INTERNAL MEDICINE

## 2019-05-03 PROCEDURE — 77067 SCR MAMMO BI INCL CAD: CPT | Performed by: INTERNAL MEDICINE

## 2019-06-24 ENCOUNTER — OFFICE VISIT (OUTPATIENT)
Dept: INTERNAL MEDICINE CLINIC | Facility: CLINIC | Age: 54
End: 2019-06-24
Payer: COMMERCIAL

## 2019-06-24 ENCOUNTER — TELEPHONE (OUTPATIENT)
Dept: PHYSICAL THERAPY | Facility: HOSPITAL | Age: 54
End: 2019-06-24

## 2019-06-24 VITALS
DIASTOLIC BLOOD PRESSURE: 85 MMHG | SYSTOLIC BLOOD PRESSURE: 134 MMHG | HEIGHT: 65 IN | TEMPERATURE: 98 F | HEART RATE: 81 BPM | BODY MASS INDEX: 17.83 KG/M2 | WEIGHT: 107 LBS

## 2019-06-24 DIAGNOSIS — R09.81 SINUS CONGESTION: Primary | ICD-10-CM

## 2019-06-24 DIAGNOSIS — R35.0 FREQUENT URINATION: ICD-10-CM

## 2019-06-24 PROCEDURE — 99212 OFFICE O/P EST SF 10 MIN: CPT | Performed by: INTERNAL MEDICINE

## 2019-06-24 PROCEDURE — 81002 URINALYSIS NONAUTO W/O SCOPE: CPT | Performed by: INTERNAL MEDICINE

## 2019-06-24 PROCEDURE — 99214 OFFICE O/P EST MOD 30 MIN: CPT | Performed by: INTERNAL MEDICINE

## 2019-06-24 RX ORDER — AMOXICILLIN AND CLAVULANATE POTASSIUM 875; 125 MG/1; MG/1
1 TABLET, FILM COATED ORAL 2 TIMES DAILY
Qty: 20 TABLET | Refills: 0 | Status: SHIPPED | OUTPATIENT
Start: 2019-06-24 | End: 2019-07-04

## 2019-06-24 NOTE — PATIENT INSTRUCTIONS
Sinusitis (Antibiotic Treatment)    The sinuses are air-filled spaces within the bones of the face. They connect to the inside of the nose. Sinusitis is an inflammation of the tissue that lines the sinuses. Sinusitis can occur during a cold.  It can also · Do not use nasal rinses or irrigation during an acute sinus infection, unless your healthcare provider tells you to. Rinsing may spread the infection to other areas in your sinuses.   · Use acetaminophen or ibuprofen to control pain, unless another pain m Urinary tract infections (UTIs) are most often caused by bacteria. These bacteria enter the urinary tract. The bacteria may come from outside the body. Or they may travel from the skin outside the rectum or vagina into the urethra.  Female anatomy makes it · Drink plenty of fluids. This includes water, juice, or other caffeine-free drinks. Fluids help flush bacteria out of your body. · Empty your bladder. Always empty your bladder when you feel the urge to urinate. And always urinate before going to sleep.

## 2019-06-27 ENCOUNTER — OFFICE VISIT (OUTPATIENT)
Dept: PHYSICAL THERAPY | Facility: HOSPITAL | Age: 54
End: 2019-06-27
Attending: PHYSICAL MEDICINE & REHABILITATION
Payer: COMMERCIAL

## 2019-06-27 DIAGNOSIS — M48.061 LUMBAR FORAMINAL STENOSIS: ICD-10-CM

## 2019-06-27 DIAGNOSIS — M51.26 LUMBAR HERNIATED DISC: ICD-10-CM

## 2019-06-27 DIAGNOSIS — M48.061 SPINAL STENOSIS OF LUMBAR REGION WITHOUT NEUROGENIC CLAUDICATION: ICD-10-CM

## 2019-06-27 DIAGNOSIS — M43.16 SPONDYLOLISTHESIS, LUMBAR REGION: ICD-10-CM

## 2019-06-27 DIAGNOSIS — M54.16 LUMBAR RADICULOPATHY: ICD-10-CM

## 2019-06-27 DIAGNOSIS — M51.36 ANNULAR TEAR OF LUMBAR DISC: ICD-10-CM

## 2019-06-27 DIAGNOSIS — M51.9 LUMBAR DISC DISEASE: ICD-10-CM

## 2019-06-27 PROCEDURE — 97162 PT EVAL MOD COMPLEX 30 MIN: CPT

## 2019-06-27 PROCEDURE — 97112 NEUROMUSCULAR REEDUCATION: CPT

## 2019-06-27 PROCEDURE — 97140 MANUAL THERAPY 1/> REGIONS: CPT

## 2019-06-27 NOTE — PROGRESS NOTES
LUMBAR SPINE EVALUATION:   Sudhir Yung    3/17/1965  Referring Physician:  Jenna Clifford  Diagnosis: Lumbar radiculopathy (M54.16)  Spondylolisthesis, lumbar region (M43.16)  Lumbar disc disease (M51.9)  Annular tear of lumbar disc (M51.36)  Spina leaning forward, sitting in the wrong chairs and squatting will worsen her pain along with stress and diet. Currently has UTI and sinus infection.    History of current condition: She had a time of high stress mid May and was eating poor which she feels co P2 NVD2,  GI: diarrhea for one month  Stress: high  Work:  teacher  Living environment: home, w , 2 sons  Stairs: 4 steps in, flight to bedroom and down to laundry  Blood thinners: no  Diabetes: no  Latex Allergy: no  Pacemaker: no     OBJECTIVE: Lumbar 6/27/2019   Flexion min   Extension Mod - dizziness   R SB min   L SB mod   R Rotation min   L Rotation Min-md   * pain limiting    ROM Hip 6/27/2019   Flex R 140   Flex L 145   IR R 30   IR L 30   ER R 50   ER L 50   Ext R wnl   Ext L wnl   Abd R w narrowing. L4-L5:   Broad based disc bulge with posterior annular fissure. Indentation of the ventral thecal sac. Mild to moderate central stenosis. There is facet arthropathy. Mild bilateral foraminal narrowing.   L5-S1:   There is a broad disc bulge with Timed treatment: 25 min      Total Treatment Time: 55 min    Thank you for your referral. Please co-sign or sign and return this letter via fax as soon as possible to [phone].  If you have any questions, please contact me at Dept: [phone]    Since

## 2019-07-08 ENCOUNTER — TELEPHONE (OUTPATIENT)
Dept: PHYSICAL THERAPY | Facility: HOSPITAL | Age: 54
End: 2019-07-08

## 2019-07-11 ENCOUNTER — TELEPHONE (OUTPATIENT)
Dept: PHYSICAL THERAPY | Facility: HOSPITAL | Age: 54
End: 2019-07-11

## 2019-07-12 ENCOUNTER — APPOINTMENT (OUTPATIENT)
Dept: PHYSICAL THERAPY | Facility: HOSPITAL | Age: 54
End: 2019-07-12
Attending: PHYSICAL MEDICINE & REHABILITATION
Payer: COMMERCIAL

## 2019-08-01 ENCOUNTER — TELEPHONE (OUTPATIENT)
Dept: PHYSICAL THERAPY | Facility: HOSPITAL | Age: 54
End: 2019-08-01

## 2019-08-01 NOTE — TELEPHONE ENCOUNTER
Lulu Paiz, called pt and advised that this therapist has a broken wrist and scheduled PT visits will need to be cancelled. Discussed pt's current status and pt chose to discharge to her HEP at this time w possible return at a later date.

## 2019-08-02 ENCOUNTER — APPOINTMENT (OUTPATIENT)
Dept: PHYSICAL THERAPY | Facility: HOSPITAL | Age: 54
End: 2019-08-02
Attending: PHYSICAL MEDICINE & REHABILITATION
Payer: COMMERCIAL

## 2020-05-11 ENCOUNTER — NURSE TRIAGE (OUTPATIENT)
Dept: INTERNAL MEDICINE CLINIC | Facility: CLINIC | Age: 55
End: 2020-05-11

## 2020-05-11 NOTE — TELEPHONE ENCOUNTER
Action Requested: Summary for Provider     []  Critical Lab, Recommendations Needed  [] Need Additional Advice  []   FYI    []   Need Orders  [] Need Medications Sent to Pharmacy  []  Other     SUMMARY: Onset 3 weeks.   Palm of the left hand is yellow color

## 2020-05-12 ENCOUNTER — TELEMEDICINE (OUTPATIENT)
Dept: INTERNAL MEDICINE CLINIC | Facility: CLINIC | Age: 55
End: 2020-05-12
Payer: COMMERCIAL

## 2020-05-12 DIAGNOSIS — Z00.00 ROUTINE GENERAL MEDICAL EXAMINATION AT A HEALTH CARE FACILITY: ICD-10-CM

## 2020-05-12 DIAGNOSIS — L81.9 DISCOLORATION OF SKIN OF HAND: Primary | ICD-10-CM

## 2020-05-12 DIAGNOSIS — K62.89 RECTAL PAIN: ICD-10-CM

## 2020-05-12 PROCEDURE — 99214 OFFICE O/P EST MOD 30 MIN: CPT | Performed by: INTERNAL MEDICINE

## 2020-05-12 NOTE — PROGRESS NOTES
Virtual Telephone Check-In    Veronique Buchanan verbally consents to a Virtual/VIDEO  Check-In visit on 05/12/20. Patient understands and accepts financial responsibility for any deductible, co-insurance and/or co-pays associated with this service.     D left arm  Hx of syrinx suragery   Skin: Negative for rash. Neurological: Negative for syncope, weakness, light-headedness and headaches. Hematological: Negative for adenopathy. Does not bruise/bleed easily.    Psychiatric/Behavioral: Negative for agitat Exam  Hand not discolored at this time    Pt is alert  speaks in full sentences without shortness of breath           ASSESSMENT/PLAN:   (L81.9) Discoloration of skin of hand  (primary encounter diagnosis)  Comment: left  Plan: order labs  Monitor  Etiolog

## 2020-05-14 ENCOUNTER — LAB ENCOUNTER (OUTPATIENT)
Dept: LAB | Age: 55
End: 2020-05-14
Attending: INTERNAL MEDICINE
Payer: COMMERCIAL

## 2020-05-14 DIAGNOSIS — Z00.00 ROUTINE GENERAL MEDICAL EXAMINATION AT A HEALTH CARE FACILITY: ICD-10-CM

## 2020-05-14 PROCEDURE — 85027 COMPLETE CBC AUTOMATED: CPT

## 2020-05-14 PROCEDURE — 87086 URINE CULTURE/COLONY COUNT: CPT

## 2020-05-14 PROCEDURE — 80061 LIPID PANEL: CPT

## 2020-05-14 PROCEDURE — 36415 COLL VENOUS BLD VENIPUNCTURE: CPT

## 2020-05-14 PROCEDURE — 84439 ASSAY OF FREE THYROXINE: CPT

## 2020-05-14 PROCEDURE — 83036 HEMOGLOBIN GLYCOSYLATED A1C: CPT

## 2020-05-14 PROCEDURE — 81015 MICROSCOPIC EXAM OF URINE: CPT

## 2020-05-14 PROCEDURE — 84443 ASSAY THYROID STIM HORMONE: CPT

## 2020-05-14 PROCEDURE — 80053 COMPREHEN METABOLIC PANEL: CPT

## 2020-05-17 ENCOUNTER — TELEPHONE (OUTPATIENT)
Dept: INTERNAL MEDICINE CLINIC | Facility: CLINIC | Age: 55
End: 2020-05-17

## 2020-05-17 NOTE — TELEPHONE ENCOUNTER
Discussed labs    Start thyroid med  Per pt gaining wt hair falling off  Will give levothyrox 25 mg everyother day  tsh in 2 mo    Repeat Ua now    Patient voiced understanding  and agrees with plan

## 2020-05-28 ENCOUNTER — APPOINTMENT (OUTPATIENT)
Dept: LAB | Age: 55
End: 2020-05-28
Attending: INTERNAL MEDICINE
Payer: COMMERCIAL

## 2020-05-28 DIAGNOSIS — R82.90 ABNORMAL URINALYSIS: ICD-10-CM

## 2020-05-28 PROCEDURE — 81003 URINALYSIS AUTO W/O SCOPE: CPT

## 2020-06-09 ENCOUNTER — OFFICE VISIT (OUTPATIENT)
Dept: INTERNAL MEDICINE CLINIC | Facility: CLINIC | Age: 55
End: 2020-06-09
Payer: COMMERCIAL

## 2020-06-09 VITALS
BODY MASS INDEX: 18.99 KG/M2 | WEIGHT: 114 LBS | SYSTOLIC BLOOD PRESSURE: 116 MMHG | TEMPERATURE: 100 F | HEIGHT: 65 IN | HEART RATE: 79 BPM | DIASTOLIC BLOOD PRESSURE: 77 MMHG

## 2020-06-09 DIAGNOSIS — Z12.31 VISIT FOR SCREENING MAMMOGRAM: ICD-10-CM

## 2020-06-09 DIAGNOSIS — Z12.11 COLON CANCER SCREENING: ICD-10-CM

## 2020-06-09 DIAGNOSIS — R79.89 ABNORMAL THYROID BLOOD TEST: ICD-10-CM

## 2020-06-09 DIAGNOSIS — Z00.00 ROUTINE GENERAL MEDICAL EXAMINATION AT A HEALTH CARE FACILITY: Primary | ICD-10-CM

## 2020-06-09 PROCEDURE — 99396 PREV VISIT EST AGE 40-64: CPT | Performed by: INTERNAL MEDICINE

## 2020-06-09 RX ORDER — LEVOTHYROXINE SODIUM 0.03 MG/1
25 TABLET ORAL DAILY
Qty: 90 TABLET | Refills: 0 | Status: SHIPPED | OUTPATIENT
Start: 2020-06-09 | End: 2020-08-12

## 2020-06-09 NOTE — PROGRESS NOTES
HPI:    Patient ID: Laureano Dyer is a 48year old female.     HPI    Physical exam  Generally healthy      /77 (BP Location: Left arm, Patient Position: Sitting, Cuff Size: adult)   Pulse 79   Temp 99.5 °F (37.5 °C) (Oral)   Ht 5' 5\" (1.651 m) daily. 24 tablet 3   • Saline (AYR NASAL MIST ALLERGY/SINUS) 2.65 % Nasal Solution 1 spray by Nasal route 3 (three) times daily. 50 mL 0   • Probiotic Product (PROBIOTIC DAILY OR) Take by mouth daily.        Allergies:No Known Allergies    HISTORY:  Past Me Effort normal and breath sounds normal. No respiratory distress. She has no wheezes. She has no rales. She exhibits no tenderness. Abdominal: Soft. Bowel sounds are normal. She exhibits no distension and no mass. There is no hepatosplenomegaly.  There is

## 2020-07-31 ENCOUNTER — HOSPITAL ENCOUNTER (OUTPATIENT)
Dept: MAMMOGRAPHY | Age: 55
Discharge: HOME | End: 2020-07-31
Attending: INTERNAL MEDICINE
Payer: COMMERCIAL

## 2020-07-31 DIAGNOSIS — Z12.31 VISIT FOR SCREENING MAMMOGRAM: ICD-10-CM

## 2020-07-31 PROCEDURE — 77063 BREAST TOMOSYNTHESIS BI: CPT | Performed by: INTERNAL MEDICINE

## 2020-07-31 PROCEDURE — 77067 SCR MAMMO BI INCL CAD: CPT | Performed by: INTERNAL MEDICINE

## 2020-08-12 DIAGNOSIS — R79.89 ABNORMAL THYROID BLOOD TEST: ICD-10-CM

## 2020-08-12 RX ORDER — LEVOTHYROXINE SODIUM 0.03 MG/1
TABLET ORAL
Qty: 45 TABLET | Refills: 0 | Status: SHIPPED | OUTPATIENT
Start: 2020-08-12 | End: 2020-11-22

## 2020-11-21 DIAGNOSIS — R79.89 ABNORMAL THYROID BLOOD TEST: ICD-10-CM

## 2020-11-22 RX ORDER — LEVOTHYROXINE SODIUM 0.03 MG/1
TABLET ORAL
Qty: 45 TABLET | Refills: 0 | Status: SHIPPED | OUTPATIENT
Start: 2020-11-22 | End: 2021-01-04

## 2021-01-04 DIAGNOSIS — R79.89 ABNORMAL THYROID BLOOD TEST: ICD-10-CM

## 2021-01-04 RX ORDER — LEVOTHYROXINE SODIUM 0.03 MG/1
TABLET ORAL
Qty: 45 TABLET | Refills: 0 | Status: SHIPPED | OUTPATIENT
Start: 2021-01-04 | End: 2021-04-25

## 2021-01-28 ENCOUNTER — OFFICE VISIT (OUTPATIENT)
Dept: DERMATOLOGY CLINIC | Facility: CLINIC | Age: 56
End: 2021-01-28
Payer: COMMERCIAL

## 2021-01-28 DIAGNOSIS — L71.9 ROSACEA: Primary | ICD-10-CM

## 2021-01-28 DIAGNOSIS — D23.9 BENIGN NEOPLASM OF SKIN, UNSPECIFIED LOCATION: ICD-10-CM

## 2021-01-28 DIAGNOSIS — L70.0 ACNE VULGARIS: ICD-10-CM

## 2021-01-28 PROCEDURE — 99213 OFFICE O/P EST LOW 20 MIN: CPT | Performed by: DERMATOLOGY

## 2021-02-07 NOTE — PROGRESS NOTES
Willian Medina is a 48year old female.     Patient presents with:  Derm Problem: LOV 7/20/2018, patient states in [place] has a small bump left center of nose, red in color, denies drainage, not hard, patient denies personal history of skin canc Seizure disorder ([facility]    • Seizures ([facility]    • Stroke [facility])      Past Surgical History:   Procedure Laterality Date   • ARTHROSCOPY OF JOINT UNLISTED     • KNEE SURGERY     • PHILIP BIOPSY STEREO NODULE 1 SITE RIGHT (CPT=19081)  01/05/2017    benign   • OTHE activity: Not on file    Other Topics      Concerns:         Service: Not Asked        Blood Transfusions: Not Asked        Caffeine Concern: Yes          Soda, Coffee, 2 cups;          Occupational Exposure: Not Asked        Hobby Hazards: Not Aske concern red bump on nose. Has noted acne on chin comes and goes worse with mask. Nothing is really new or different. Patient presents with concerns above. ROS:    Denies any other systemic complaints.   No fevers, chills, night sweats, sensitivity Therapeutic options reviewed. See  Medications in grid. Instructions reviewed at length. General skin care questions answered. Reassurance regarding benign skin lesions. Signs and symptoms of skin cancer, ABCDE's of melanoma briefly reviewed.   Suns

## 2021-04-25 DIAGNOSIS — R79.89 ABNORMAL THYROID BLOOD TEST: ICD-10-CM

## 2021-04-25 RX ORDER — LEVOTHYROXINE SODIUM 0.03 MG/1
TABLET ORAL
Qty: 45 TABLET | Refills: 0 | Status: SHIPPED | OUTPATIENT
Start: 2021-04-25 | End: 2021-08-05

## 2021-07-01 ENCOUNTER — TELEPHONE (OUTPATIENT)
Dept: INTERNAL MEDICINE CLINIC | Facility: CLINIC | Age: 56
End: 2021-07-01

## 2021-07-01 NOTE — TELEPHONE ENCOUNTER
Pt made [facility] px appt for 7/30 and asked if she can have labs ordered prior to appt. Please advise.

## 2021-07-02 NOTE — TELEPHONE ENCOUNTER
Dr. Viral Gregorio, patient is schedule for a Physical on 07/30/2021. Patient is requesting blood test order for appointment. Please advise.

## 2021-07-30 ENCOUNTER — OFFICE VISIT (OUTPATIENT)
Dept: INTERNAL MEDICINE CLINIC | Facility: CLINIC | Age: 56
End: 2021-07-30
Payer: COMMERCIAL

## 2021-07-30 VITALS
SYSTOLIC BLOOD PRESSURE: 115 MMHG | HEART RATE: 76 BPM | DIASTOLIC BLOOD PRESSURE: 76 MMHG | BODY MASS INDEX: 18.33 KG/M2 | WEIGHT: 110 LBS | HEIGHT: 65 IN

## 2021-07-30 DIAGNOSIS — G89.29 CHRONIC PAIN OF LEFT KNEE: ICD-10-CM

## 2021-07-30 DIAGNOSIS — Z12.31 VISIT FOR SCREENING MAMMOGRAM: ICD-10-CM

## 2021-07-30 DIAGNOSIS — Z12.11 COLON CANCER SCREENING: ICD-10-CM

## 2021-07-30 DIAGNOSIS — M25.562 CHRONIC PAIN OF LEFT KNEE: ICD-10-CM

## 2021-07-30 DIAGNOSIS — Z00.00 ROUTINE GENERAL MEDICAL EXAMINATION AT A HEALTH CARE FACILITY: Primary | ICD-10-CM

## 2021-07-30 PROCEDURE — 3074F SYST BP LT 130 MM HG: CPT | Performed by: INTERNAL MEDICINE

## 2021-07-30 PROCEDURE — 3008F BODY MASS INDEX DOCD: CPT | Performed by: INTERNAL MEDICINE

## 2021-07-30 PROCEDURE — 3078F DIAST BP <80 MM HG: CPT | Performed by: INTERNAL MEDICINE

## 2021-07-30 PROCEDURE — 99396 PREV VISIT EST AGE 40-64: CPT | Performed by: INTERNAL MEDICINE

## 2021-07-30 RX ORDER — FLUTICASONE PROPIONATE 50 MCG
2 SPRAY, SUSPENSION (ML) NASAL DAILY
Qty: 1 EACH | Refills: 5 | Status: SHIPPED | OUTPATIENT
Start: 2021-07-30 | End: 2022-07-25

## 2021-07-30 RX ORDER — ESCITALOPRAM OXALATE 5 MG/1
5 TABLET ORAL DAILY
Qty: 30 TABLET | Refills: 2 | Status: SHIPPED | OUTPATIENT
Start: 2021-07-30 | End: 2021-10-28

## 2021-08-01 PROBLEM — M51.36 ANNULAR TEAR OF LUMBAR DISC: Status: RESOLVED | Noted: 2017-06-05 | Resolved: 2021-08-01

## 2021-08-01 PROBLEM — M50.20 CERVICAL HERNIATED DISC: Status: RESOLVED | Noted: 2019-01-31 | Resolved: 2021-08-01

## 2021-08-01 PROBLEM — M54.2 NECK PAIN ON LEFT SIDE: Status: RESOLVED | Noted: 2018-08-27 | Resolved: 2021-08-01

## 2021-08-01 PROBLEM — M48.061 SPINAL STENOSIS OF LUMBAR REGION WITHOUT NEUROGENIC CLAUDICATION: Status: RESOLVED | Noted: 2017-10-31 | Resolved: 2021-08-01

## 2021-08-01 PROBLEM — M50.90 CERVICAL DISC DISEASE: Status: RESOLVED | Noted: 2019-01-31 | Resolved: 2021-08-01

## 2021-08-01 PROBLEM — M51.369 ANNULAR TEAR OF LUMBAR DISC: Status: RESOLVED | Noted: 2017-06-05 | Resolved: 2021-08-01

## 2021-08-01 PROBLEM — M54.16 LUMBAR RADICULOPATHY: Status: RESOLVED | Noted: 2017-06-05 | Resolved: 2021-08-01

## 2021-08-01 PROBLEM — R73.9 HYPERGLYCEMIA: Status: RESOLVED | Noted: 2017-06-05 | Resolved: 2021-08-01

## 2021-08-01 PROBLEM — M48.061 LUMBAR FORAMINAL STENOSIS: Status: RESOLVED | Noted: 2017-06-05 | Resolved: 2021-08-01

## 2021-08-01 PROBLEM — M51.9 LUMBAR DISC DISEASE: Status: RESOLVED | Noted: 2017-06-05 | Resolved: 2021-08-01

## 2021-08-01 PROBLEM — M54.59 INTRACTABLE LOW BACK PAIN: Status: RESOLVED | Noted: 2017-06-05 | Resolved: 2021-08-01

## 2021-08-01 PROBLEM — M48.02 FORAMINAL STENOSIS OF CERVICAL REGION: Status: RESOLVED | Noted: 2019-01-31 | Resolved: 2021-08-01

## 2021-08-01 PROBLEM — M54.12 CERVICAL RADICULITIS: Status: RESOLVED | Noted: 2018-08-27 | Resolved: 2021-08-01

## 2021-08-01 PROBLEM — M43.16 SPONDYLOLISTHESIS, LUMBAR REGION: Status: RESOLVED | Noted: 2017-06-05 | Resolved: 2021-08-01

## 2021-08-01 PROBLEM — M51.26 LUMBAR HERNIATED DISC: Status: RESOLVED | Noted: 2017-06-05 | Resolved: 2021-08-01

## 2021-08-01 NOTE — PROGRESS NOTES
HPI:    Patient ID: Bethany Babcock is a 55year old female.     HPI    Physical exam  Generally healthy    Complain of chornic left knee pain      /76 (BP Location: Right arm, Patient Position: Sitting, Cuff Size: adult)   Pulse 76   Ht 5' 5\" (1.6 MCG/ACT Nasal Suspension 2 sprays by Each Nare route daily. 1 each 5   • Cetirizine-Pseudoephedrine ER (ZYRTEC-D ALLERGY & CONGESTION) 5-120 MG Oral Tablet 12 Hr Take 1 tablet by mouth 2 (two) times daily.  24 tablet 3   • Saline (AYR NASAL MIST ALLERGY/SIN Father         Stomach cancer   • Hypertension Mother       Social History: Social History    Tobacco Use      Smoking status: Never Smoker      Smokeless tobacco: Never Used    Vaping Use      Vaping Use: Never used    Alcohol use:  Yes      Alcohol/week: mammography,pap smear4 advised    (M25.562,  G89.29) Chronic pain of left knee  Plan: ORTHOPEDIC - INTERNAL        Referral given    (Z12.11) Colon cancer screening  Plan: OP REFERRAL TO [facility] GI TELEPHONE COLON SCREEN        asymptoamtic    (Z12.31) Visit f

## 2021-08-03 ENCOUNTER — HOSPITAL ENCOUNTER (OUTPATIENT)
Dept: MAMMOGRAPHY | Age: 56
Discharge: HOME | End: 2021-08-03
Attending: INTERNAL MEDICINE
Payer: COMMERCIAL

## 2021-08-03 ENCOUNTER — LAB ENCOUNTER (OUTPATIENT)
Dept: LAB | Age: 56
End: 2021-08-03
Attending: INTERNAL MEDICINE
Payer: COMMERCIAL

## 2021-08-03 DIAGNOSIS — Z12.31 VISIT FOR SCREENING MAMMOGRAM: ICD-10-CM

## 2021-08-03 DIAGNOSIS — Z00.00 ROUTINE GENERAL MEDICAL EXAMINATION AT A HEALTH CARE FACILITY: ICD-10-CM

## 2021-08-03 LAB
ALBUMIN SERPL-MCNC: 4 G/DL (ref 3.4–5)
ALBUMIN/GLOB SERPL: 1.3 {RATIO} (ref 1–2)
ALP LIVER SERPL-CCNC: 71 U/L
ALT SERPL-CCNC: 32 U/L
ANION GAP SERPL CALC-SCNC: 9 MMOL/L (ref 0–18)
AST SERPL-CCNC: 27 U/L (ref 15–37)
BILIRUB SERPL-MCNC: 0.6 MG/DL (ref 0.1–2)
BUN BLD-MCNC: 8 MG/DL (ref 7–18)
BUN/CREAT SERPL: 10.5 (ref 10–20)
CALCIUM BLD-MCNC: 9.4 MG/DL (ref 8.5–10.1)
CHLORIDE SERPL-SCNC: 102 MMOL/L (ref 98–112)
CHOLEST SMN-MCNC: 205 MG/DL (ref ?–200)
CO2 SERPL-SCNC: 28 MMOL/L (ref 21–32)
CREAT BLD-MCNC: 0.76 MG/DL
DEPRECATED RDW RBC AUTO: 43.3 FL (ref 35.1–46.3)
ERYTHROCYTE [DISTWIDTH] IN BLOOD BY AUTOMATED COUNT: 12.9 % (ref 11–15)
EST. AVERAGE GLUCOSE BLD GHB EST-MCNC: 120 MG/DL (ref 68–126)
GLOBULIN PLAS-MCNC: 3 G/DL (ref 2.8–4.4)
GLUCOSE BLD-MCNC: 100 MG/DL (ref 70–99)
HBA1C MFR BLD HPLC: 5.8 % (ref ?–5.7)
HCT VFR BLD AUTO: 41.3 %
HDLC SERPL-MCNC: 125 MG/DL (ref 40–59)
HGB BLD-MCNC: 14 G/DL
LDLC SERPL CALC-MCNC: 73 MG/DL (ref ?–100)
M PROTEIN MFR SERPL ELPH: 7 G/DL (ref 6.4–8.2)
MCH RBC QN AUTO: 31.1 PG (ref 26–34)
MCHC RBC AUTO-ENTMCNC: 33.9 G/DL (ref 31–37)
MCV RBC AUTO: 91.8 FL
NONHDLC SERPL-MCNC: 80 MG/DL (ref ?–130)
OSMOLALITY SERPL CALC.SUM OF ELEC: 286 MOSM/KG (ref 275–295)
PATIENT FASTING Y/N/NP: YES
PATIENT FASTING Y/N/NP: YES
PLATELET # BLD AUTO: 257 10(3)UL (ref 150–450)
POTASSIUM SERPL-SCNC: 4 MMOL/L (ref 3.5–5.1)
RBC # BLD AUTO: 4.5 X10(6)UL
SODIUM SERPL-SCNC: 139 MMOL/L (ref 136–145)
T4 FREE SERPL-MCNC: 0.9 NG/DL (ref 0.8–1.7)
TRIGL SERPL-MCNC: 36 MG/DL (ref 30–149)
TSI SER-ACNC: 3.11 MIU/ML (ref 0.36–3.74)
VLDLC SERPL CALC-MCNC: 5 MG/DL (ref 0–30)
WBC # BLD AUTO: 5.5 X10(3) UL (ref 4–11)

## 2021-08-03 PROCEDURE — 80061 LIPID PANEL: CPT

## 2021-08-03 PROCEDURE — 87147 CULTURE TYPE IMMUNOLOGIC: CPT

## 2021-08-03 PROCEDURE — 36415 COLL VENOUS BLD VENIPUNCTURE: CPT

## 2021-08-03 PROCEDURE — 77063 BREAST TOMOSYNTHESIS BI: CPT | Performed by: INTERNAL MEDICINE

## 2021-08-03 PROCEDURE — 77067 SCR MAMMO BI INCL CAD: CPT | Performed by: INTERNAL MEDICINE

## 2021-08-03 PROCEDURE — 84443 ASSAY THYROID STIM HORMONE: CPT

## 2021-08-03 PROCEDURE — 83036 HEMOGLOBIN GLYCOSYLATED A1C: CPT

## 2021-08-03 PROCEDURE — 84439 ASSAY OF FREE THYROXINE: CPT

## 2021-08-03 PROCEDURE — 81015 MICROSCOPIC EXAM OF URINE: CPT

## 2021-08-03 PROCEDURE — 80053 COMPREHEN METABOLIC PANEL: CPT

## 2021-08-03 PROCEDURE — 87086 URINE CULTURE/COLONY COUNT: CPT

## 2021-08-03 PROCEDURE — 85027 COMPLETE CBC AUTOMATED: CPT

## 2021-08-04 ENCOUNTER — TELEPHONE (OUTPATIENT)
Dept: INTERNAL MEDICINE CLINIC | Facility: CLINIC | Age: 56
End: 2021-08-04

## 2021-08-04 ENCOUNTER — LAB ENCOUNTER (OUTPATIENT)
Dept: LAB | Age: 56
End: 2021-08-04
Attending: INTERNAL MEDICINE
Payer: COMMERCIAL

## 2021-08-04 DIAGNOSIS — Z00.00 ROUTINE GENERAL MEDICAL EXAMINATION AT A HEALTH CARE FACILITY: Primary | ICD-10-CM

## 2021-08-04 DIAGNOSIS — Z00.00 ROUTINE GENERAL MEDICAL EXAMINATION AT A HEALTH CARE FACILITY: ICD-10-CM

## 2021-08-04 NOTE — TELEPHONE ENCOUNTER
URINE CULTURE <10,000 cfu/ml Mixture of Gram positive organisms isolated - probable contamination.        10,000 - 50,000 CFU/ML Streptococcus agalactiae (Group B beta strep)Abnormal     Beta Hemolytic Strep are considered universally susceptible to

## 2021-08-04 NOTE — TELEPHONE ENCOUNTER
Dayana, states that the patient is there at the lab for a repeat urinary test. Please, add order in the epic system.

## 2021-08-05 ENCOUNTER — APPOINTMENT (OUTPATIENT)
Dept: LAB | Facility: HOSPITAL | Age: 56
End: 2021-08-05
Attending: INTERNAL MEDICINE
Payer: COMMERCIAL

## 2021-08-05 DIAGNOSIS — R79.89 ABNORMAL THYROID BLOOD TEST: ICD-10-CM

## 2021-08-05 PROCEDURE — 87147 CULTURE TYPE IMMUNOLOGIC: CPT

## 2021-08-05 PROCEDURE — 87086 URINE CULTURE/COLONY COUNT: CPT

## 2021-08-05 RX ORDER — LEVOTHYROXINE SODIUM 0.03 MG/1
25 TABLET ORAL EVERY OTHER DAY
Qty: 45 TABLET | Refills: 0 | Status: SHIPPED | OUTPATIENT
Start: 2021-08-05

## 2021-08-05 NOTE — TELEPHONE ENCOUNTER
Patient is looking for new Rx/ or update for this medication.     •  LEVOTHYROXINE SODIUM 25 MCG Oral Tab, TAKE ONE TABLET BY MOUTH EVERY OTHER DAY  (Patient not taking: Reported on 7/30/2021), Disp: 45 tablet, Rfl: 0

## 2021-12-24 NOTE — PROGRESS NOTES
Patient ID: Denzel Del Toro is a 49year old female.   Patient presents with:  Sinus Problem: started 6/13/19 congestion coughing, phlem in yellow color, sinus pressure, phlem in yellow color   UTI: with frequent urination, pressure to urinate, UTI,   Osmin Lucas SINGLE LEVEL Left 6/6/2017    Performed by Jack Brito MD at [address] [place] [address] MAIN OR         Current Outpatient Medications:   •  Amoxicillin-Pot Clavulanate 875-125 MG Oral Tab, Take 1 tablet by mouth 2 (two) times daily for 10 days. , Disp: 20 tablet, Rfl: 0  • status: Not on file      Intimate partner violence:        Fear of current or ex partner: Not on file        Emotionally abused: Not on file        Physically abused: Not on file        Forced sexual activity: Not on file    Other Topics      Concerns: maxillary sinus tenderness and no frontal sinus tenderness. Mouth/Throat: No oropharyngeal exudate, posterior oropharyngeal edema, posterior oropharyngeal erythema or tonsillar abscesses.    Cardiovascular: Normal rate, regular rhythm and normal heart antonino symptoms worsen or fail to improve.     Joaquin Nance MD  6/24/2019 Implemented All Universal Safety Interventions:  [place] to call system. Call bell, personal items and telephone within reach. Instruct patient to call for assistance. Room bathroom lighting operational. Non-slip footwear when patient is off stretcher. Physically safe environment: no spills, clutter or unnecessary equipment. Stretcher in lowest position, wheels locked, appropriate side rails in place.

## 2022-03-16 ENCOUNTER — OFFICE VISIT (OUTPATIENT)
Dept: GASTROENTEROLOGY | Facility: CLINIC | Age: 57
End: 2022-03-16
Payer: COMMERCIAL

## 2022-03-16 ENCOUNTER — TELEPHONE (OUTPATIENT)
Dept: GASTROENTEROLOGY | Facility: CLINIC | Age: 57
End: 2022-03-16

## 2022-03-16 VITALS
HEART RATE: 80 BPM | WEIGHT: 112 LBS | BODY MASS INDEX: 18.66 KG/M2 | SYSTOLIC BLOOD PRESSURE: 153 MMHG | HEIGHT: 65 IN | DIASTOLIC BLOOD PRESSURE: 81 MMHG

## 2022-03-16 DIAGNOSIS — K21.9 GASTROESOPHAGEAL REFLUX DISEASE, UNSPECIFIED WHETHER ESOPHAGITIS PRESENT: ICD-10-CM

## 2022-03-16 DIAGNOSIS — R14.0 ABDOMINAL BLOATING: ICD-10-CM

## 2022-03-16 DIAGNOSIS — Z80.0 FAMILY HISTORY OF GASTRIC CANCER: Primary | ICD-10-CM

## 2022-03-16 PROCEDURE — 3079F DIAST BP 80-89 MM HG: CPT | Performed by: INTERNAL MEDICINE

## 2022-03-16 PROCEDURE — 99203 OFFICE O/P NEW LOW 30 MIN: CPT | Performed by: INTERNAL MEDICINE

## 2022-03-16 PROCEDURE — 3077F SYST BP >= 140 MM HG: CPT | Performed by: INTERNAL MEDICINE

## 2022-03-16 PROCEDURE — 3008F BODY MASS INDEX DOCD: CPT | Performed by: INTERNAL MEDICINE

## 2022-03-16 RX ORDER — SODIUM, POTASSIUM,MAG SULFATES 17.5-3.13G
SOLUTION, RECONSTITUTED, ORAL ORAL
Qty: 1 EACH | Refills: 0 | Status: SHIPPED | OUTPATIENT
Start: 2022-03-16

## 2022-03-16 NOTE — TELEPHONE ENCOUNTER
Scheduled for:  Colonoscopy [phone] & EGD [id number]  Provider Name:  Dr. Franklin Mullins  Date:  8/18/2022  [facility]  Sedation:  MAC  Time:  8:45 am, (pt is aware that [facility] will call the day before to confirm arrival time)  Prep:  Suprep  Meds/Allergies Reconciled?:  Physician reviewed  Diagnosis with codes:  Family hx of gastric cancer Z80.0; GERD K21.9; Bloating R14.0; Screening for colon cancer Z12.11  Was patient informed to call insurance with codes (Y/N):  Yes  Referral sent?: Yes  [facility] or [id number] notified?:  Electronic case request was sent to [facility] via [employer]. Medication Orders:  N/A  Misc Orders:  Patient was informed that they will need a COVID 19 test prior to their procedure. Patient verbally understood & will await a phone call from [facility] to schedule. Further instructions given by staff:  I provided patient with prep instruction sheet.

## 2022-03-27 PROBLEM — Z80.0 FAMILY HISTORY OF GASTRIC CANCER: Status: ACTIVE | Noted: 2022-03-27

## 2022-04-21 ENCOUNTER — OFFICE VISIT (OUTPATIENT)
Dept: INTEGRATIVE MEDICINE | Facility: CLINIC | Age: 57
End: 2022-04-21
Payer: COMMERCIAL

## 2022-04-21 VITALS
HEIGHT: 65 IN | WEIGHT: 111.19 LBS | BODY MASS INDEX: 18.52 KG/M2 | DIASTOLIC BLOOD PRESSURE: 68 MMHG | SYSTOLIC BLOOD PRESSURE: 110 MMHG

## 2022-04-21 DIAGNOSIS — I73.00 RAYNAUD'S DISEASE WITHOUT GANGRENE: Primary | ICD-10-CM

## 2022-04-21 DIAGNOSIS — M25.50 ARTHRALGIA, UNSPECIFIED JOINT: ICD-10-CM

## 2022-04-21 DIAGNOSIS — R79.89 ABNORMAL TSH: ICD-10-CM

## 2022-04-21 DIAGNOSIS — R73.01 ELEVATED FASTING BLOOD SUGAR: ICD-10-CM

## 2022-04-21 DIAGNOSIS — Z80.0 FAMILY HISTORY OF GASTRIC CANCER: ICD-10-CM

## 2022-04-21 PROCEDURE — 3008F BODY MASS INDEX DOCD: CPT | Performed by: FAMILY MEDICINE

## 2022-04-21 PROCEDURE — 3078F DIAST BP <80 MM HG: CPT | Performed by: FAMILY MEDICINE

## 2022-04-21 PROCEDURE — 99205 OFFICE O/P NEW HI 60 MIN: CPT | Performed by: FAMILY MEDICINE

## 2022-04-21 PROCEDURE — 3074F SYST BP LT 130 MM HG: CPT | Performed by: FAMILY MEDICINE

## 2022-05-06 ENCOUNTER — LAB ENCOUNTER (OUTPATIENT)
Dept: LAB | Facility: HOSPITAL | Age: 57
End: 2022-05-06
Attending: FAMILY MEDICINE
Payer: COMMERCIAL

## 2022-05-06 DIAGNOSIS — M25.50 ARTHRALGIA, UNSPECIFIED JOINT: ICD-10-CM

## 2022-05-06 DIAGNOSIS — R79.89 ABNORMAL TSH: ICD-10-CM

## 2022-05-06 DIAGNOSIS — I73.00 RAYNAUD'S DISEASE WITHOUT GANGRENE: ICD-10-CM

## 2022-05-06 DIAGNOSIS — R73.01 ELEVATED FASTING BLOOD SUGAR: ICD-10-CM

## 2022-05-06 LAB
ALBUMIN SERPL-MCNC: 4.2 G/DL (ref 3.4–5)
ALBUMIN/GLOB SERPL: 1.3 {RATIO} (ref 1–2)
ALP LIVER SERPL-CCNC: 67 U/L
ALT SERPL-CCNC: 33 U/L
ANION GAP SERPL CALC-SCNC: 4 MMOL/L (ref 0–18)
AST SERPL-CCNC: 30 U/L (ref 15–37)
BASOPHILS # BLD AUTO: 0.03 X10(3) UL (ref 0–0.2)
BASOPHILS NFR BLD AUTO: 0.7 %
BILIRUB SERPL-MCNC: 0.7 MG/DL (ref 0.1–2)
BUN BLD-MCNC: 10 MG/DL (ref 7–18)
BUN/CREAT SERPL: 12.7 (ref 10–20)
CALCIUM BLD-MCNC: 9.1 MG/DL (ref 8.5–10.1)
CHLORIDE SERPL-SCNC: 106 MMOL/L (ref 98–112)
CHOLEST SERPL-MCNC: 188 MG/DL (ref ?–200)
CO2 SERPL-SCNC: 29 MMOL/L (ref 21–32)
CREAT BLD-MCNC: 0.79 MG/DL
CRP SERPL HS-MCNC: <0.16 MG/L (ref ?–3)
DEPRECATED RDW RBC AUTO: 47.9 FL (ref 35.1–46.3)
DHEA-S SERPL-MCNC: 97.9 UG/DL
EOSINOPHIL # BLD AUTO: 0.15 X10(3) UL (ref 0–0.7)
EOSINOPHIL NFR BLD AUTO: 3.6 %
ERYTHROCYTE [DISTWIDTH] IN BLOOD BY AUTOMATED COUNT: 13.6 % (ref 11–15)
FASTING PATIENT LIPID ANSWER: YES
FASTING STATUS PATIENT QL REPORTED: YES
GLOBULIN PLAS-MCNC: 3.2 G/DL (ref 2.8–4.4)
GLUCOSE BLD-MCNC: 93 MG/DL (ref 70–99)
HCT VFR BLD AUTO: 43.9 %
HDLC SERPL-MCNC: 125 MG/DL (ref 40–59)
HGB BLD-MCNC: 14.2 G/DL
IMM GRANULOCYTES # BLD AUTO: 0.01 X10(3) UL (ref 0–1)
IMM GRANULOCYTES NFR BLD: 0.2 %
INSULIN SERPL-ACNC: 2.4 MU/L (ref 3–25)
LDLC SERPL CALC-MCNC: 55 MG/DL (ref ?–100)
LYMPHOCYTES # BLD AUTO: 1.43 X10(3) UL (ref 1–4)
LYMPHOCYTES NFR BLD AUTO: 34.5 %
MCH RBC QN AUTO: 30.8 PG (ref 26–34)
MCHC RBC AUTO-ENTMCNC: 32.3 G/DL (ref 31–37)
MCV RBC AUTO: 95.2 FL
MONOCYTES # BLD AUTO: 0.47 X10(3) UL (ref 0.1–1)
MONOCYTES NFR BLD AUTO: 11.4 %
NEUTROPHILS # BLD AUTO: 2.05 X10 (3) UL (ref 1.5–7.7)
NEUTROPHILS # BLD AUTO: 2.05 X10(3) UL (ref 1.5–7.7)
NEUTROPHILS NFR BLD AUTO: 49.6 %
NONHDLC SERPL-MCNC: 63 MG/DL (ref ?–130)
OSMOLALITY SERPL CALC.SUM OF ELEC: 287 MOSM/KG (ref 275–295)
PLATELET # BLD AUTO: 246 10(3)UL (ref 150–450)
POTASSIUM SERPL-SCNC: 4 MMOL/L (ref 3.5–5.1)
PROT SERPL-MCNC: 7.4 G/DL (ref 6.4–8.2)
RBC # BLD AUTO: 4.61 X10(6)UL
SODIUM SERPL-SCNC: 139 MMOL/L (ref 136–145)
T3FREE SERPL-MCNC: 2.07 PG/ML (ref 2.4–4.2)
T4 FREE SERPL-MCNC: 0.9 NG/DL (ref 0.8–1.7)
THYROGLOB SERPL-MCNC: <15 U/ML (ref ?–60)
THYROPEROXIDASE AB SERPL-ACNC: <28 U/ML (ref ?–60)
TRIGL SERPL-MCNC: 38 MG/DL (ref 30–149)
TSI SER-ACNC: 3.21 MIU/ML (ref 0.36–3.74)
VIT D+METAB SERPL-MCNC: 25.8 NG/ML (ref 30–100)
VLDLC SERPL CALC-MCNC: 5 MG/DL (ref 0–30)
WBC # BLD AUTO: 4.1 X10(3) UL (ref 4–11)

## 2022-05-06 PROCEDURE — 83735 ASSAY OF MAGNESIUM: CPT

## 2022-05-06 PROCEDURE — 86800 THYROGLOBULIN ANTIBODY: CPT

## 2022-05-06 PROCEDURE — 84481 FREE ASSAY (FT-3): CPT

## 2022-05-06 PROCEDURE — 82306 VITAMIN D 25 HYDROXY: CPT

## 2022-05-06 PROCEDURE — 86376 MICROSOMAL ANTIBODY EACH: CPT

## 2022-05-06 PROCEDURE — 36415 COLL VENOUS BLD VENIPUNCTURE: CPT

## 2022-05-06 PROCEDURE — 82985 ASSAY OF GLYCATED PROTEIN: CPT

## 2022-05-06 PROCEDURE — 84439 ASSAY OF FREE THYROXINE: CPT

## 2022-05-06 PROCEDURE — 85025 COMPLETE CBC W/AUTO DIFF WBC: CPT

## 2022-05-06 PROCEDURE — 84630 ASSAY OF ZINC: CPT

## 2022-05-06 PROCEDURE — 82627 DEHYDROEPIANDROSTERONE: CPT

## 2022-05-06 PROCEDURE — 80061 LIPID PANEL: CPT

## 2022-05-06 PROCEDURE — 86141 C-REACTIVE PROTEIN HS: CPT

## 2022-05-06 PROCEDURE — 80053 COMPREHEN METABOLIC PANEL: CPT

## 2022-05-06 PROCEDURE — 83525 ASSAY OF INSULIN: CPT

## 2022-05-06 PROCEDURE — 84482 T3 REVERSE: CPT

## 2022-05-06 PROCEDURE — 84443 ASSAY THYROID STIM HORMONE: CPT

## 2022-05-07 LAB — FRUCTOSAMINE: 282 UMOL/L

## 2022-05-10 LAB — ZINC SERUM: 74.1 UG/DL

## 2022-05-11 LAB
MAGNESIUM, RBCS: 5.2 MG/DL
TRIIODOTHYRONINE, REVERSE: 12.5 NG/DL

## 2022-08-10 ENCOUNTER — TELEPHONE (OUTPATIENT)
Dept: INTEGRATIVE MEDICINE | Facility: CLINIC | Age: 57
End: 2022-08-10

## 2022-08-10 DIAGNOSIS — I73.00 RAYNAUD'S DISEASE WITHOUT GANGRENE: Primary | ICD-10-CM

## 2022-08-10 NOTE — TELEPHONE ENCOUNTER
Pt stated Dr. Ramy Mcginnis ordered a Complete Metabolic Panel 14 - [facility]. It is not in the system.

## 2022-08-11 ENCOUNTER — LAB ENCOUNTER (OUTPATIENT)
Dept: LAB | Age: 57
End: 2022-08-11
Attending: FAMILY MEDICINE
Payer: COMMERCIAL

## 2022-08-11 DIAGNOSIS — I73.00 RAYNAUD'S DISEASE WITHOUT GANGRENE: ICD-10-CM

## 2022-08-11 LAB
ALBUMIN SERPL-MCNC: 4 G/DL (ref 3.4–5)
ALBUMIN/GLOB SERPL: 1.2 {RATIO} (ref 1–2)
ALP LIVER SERPL-CCNC: 114 U/L
ALT SERPL-CCNC: 36 U/L
ANION GAP SERPL CALC-SCNC: 8 MMOL/L (ref 0–18)
AST SERPL-CCNC: 29 U/L (ref 15–37)
BILIRUB SERPL-MCNC: 0.5 MG/DL (ref 0.1–2)
BUN BLD-MCNC: 18 MG/DL (ref 7–18)
BUN/CREAT SERPL: 20.7 (ref 10–20)
CALCIUM BLD-MCNC: 9.5 MG/DL (ref 8.5–10.1)
CHLORIDE SERPL-SCNC: 103 MMOL/L (ref 98–112)
CO2 SERPL-SCNC: 28 MMOL/L (ref 21–32)
CREAT BLD-MCNC: 0.87 MG/DL
FASTING STATUS PATIENT QL REPORTED: NO
GFR SERPLBLD BASED ON 1.73 SQ M-ARVRAT: 78 ML/MIN/1.73M2 (ref 60–?)
GLOBULIN PLAS-MCNC: 3.4 G/DL (ref 2.8–4.4)
GLUCOSE BLD-MCNC: 90 MG/DL (ref 70–99)
OSMOLALITY SERPL CALC.SUM OF ELEC: 289 MOSM/KG (ref 275–295)
POTASSIUM SERPL-SCNC: 4.2 MMOL/L (ref 3.5–5.1)
PROT SERPL-MCNC: 7.4 G/DL (ref 6.4–8.2)
SODIUM SERPL-SCNC: 139 MMOL/L (ref 136–145)

## 2022-08-11 PROCEDURE — 80053 COMPREHEN METABOLIC PANEL: CPT

## 2022-08-11 PROCEDURE — 36415 COLL VENOUS BLD VENIPUNCTURE: CPT

## 2022-08-15 ENCOUNTER — LAB REQUISITION (OUTPATIENT)
Dept: SURGERY | Age: 57
End: 2022-08-15
Payer: COMMERCIAL

## 2022-08-15 DIAGNOSIS — Z01.818 PREOP EXAMINATION: ICD-10-CM

## 2022-08-16 LAB — SARS-COV-2 RNA RESP QL NAA+PROBE: NOT DETECTED

## 2022-08-18 ENCOUNTER — LAB REQUISITION (OUTPATIENT)
Dept: SURGERY | Age: 57
End: 2022-08-18
Payer: COMMERCIAL

## 2022-08-18 ENCOUNTER — SURGERY CENTER DOCUMENTATION (OUTPATIENT)
Dept: SURGERY | Age: 57
End: 2022-08-18

## 2022-08-18 DIAGNOSIS — R14.0 BLOATING: ICD-10-CM

## 2022-08-18 DIAGNOSIS — K21.9 GASTROESOPHAGEAL REFLUX DISEASE, UNSPECIFIED WHETHER ESOPHAGITIS PRESENT: ICD-10-CM

## 2022-08-18 DIAGNOSIS — Z80.0 FAMILY HISTORY OF MALIGNANT NEOPLASM OF GASTROINTESTINAL TRACT: ICD-10-CM

## 2022-08-18 DIAGNOSIS — Z12.11 SPECIAL SCREENING FOR MALIGNANT NEOPLASMS, COLON: ICD-10-CM

## 2022-08-18 PROCEDURE — 88305 TISSUE EXAM BY PATHOLOGIST: CPT | Performed by: INTERNAL MEDICINE

## 2022-08-18 PROCEDURE — 88312 SPECIAL STAINS GROUP 1: CPT | Performed by: INTERNAL MEDICINE

## 2022-09-06 ENCOUNTER — TELEPHONE (OUTPATIENT)
Dept: GASTROENTEROLOGY | Facility: CLINIC | Age: 57
End: 2022-09-06

## 2022-10-24 ENCOUNTER — OFFICE VISIT (OUTPATIENT)
Dept: INTEGRATIVE MEDICINE | Facility: CLINIC | Age: 57
End: 2022-10-24
Payer: COMMERCIAL

## 2022-10-24 VITALS
BODY MASS INDEX: 19 KG/M2 | SYSTOLIC BLOOD PRESSURE: 112 MMHG | DIASTOLIC BLOOD PRESSURE: 66 MMHG | WEIGHT: 116 LBS | OXYGEN SATURATION: 97 % | HEART RATE: 68 BPM

## 2022-10-24 DIAGNOSIS — R73.01 ELEVATED FASTING BLOOD SUGAR: ICD-10-CM

## 2022-10-24 DIAGNOSIS — R53.83 OTHER FATIGUE: ICD-10-CM

## 2022-10-24 DIAGNOSIS — M25.50 ARTHRALGIA, UNSPECIFIED JOINT: ICD-10-CM

## 2022-10-24 DIAGNOSIS — R79.89 ABNORMAL TSH: Primary | ICD-10-CM

## 2022-10-24 DIAGNOSIS — Z78.0 MENOPAUSE: ICD-10-CM

## 2022-10-24 DIAGNOSIS — R14.1 GAS PAIN: ICD-10-CM

## 2022-10-24 PROCEDURE — 99214 OFFICE O/P EST MOD 30 MIN: CPT | Performed by: FAMILY MEDICINE

## 2022-10-24 PROCEDURE — 3078F DIAST BP <80 MM HG: CPT | Performed by: FAMILY MEDICINE

## 2022-10-24 PROCEDURE — 3074F SYST BP LT 130 MM HG: CPT | Performed by: FAMILY MEDICINE

## 2022-11-08 ENCOUNTER — LAB ENCOUNTER (OUTPATIENT)
Dept: LAB | Age: 57
End: 2022-11-08
Attending: FAMILY MEDICINE
Payer: COMMERCIAL

## 2022-11-08 DIAGNOSIS — R73.01 ELEVATED FASTING BLOOD SUGAR: ICD-10-CM

## 2022-11-08 DIAGNOSIS — Z78.0 MENOPAUSE: ICD-10-CM

## 2022-11-08 DIAGNOSIS — R53.83 OTHER FATIGUE: ICD-10-CM

## 2022-11-08 DIAGNOSIS — M25.50 ARTHRALGIA, UNSPECIFIED JOINT: ICD-10-CM

## 2022-11-08 DIAGNOSIS — R79.89 ABNORMAL TSH: ICD-10-CM

## 2022-11-08 LAB
ALBUMIN SERPL-MCNC: 4.1 G/DL (ref 3.4–5)
ALBUMIN/GLOB SERPL: 1.3 {RATIO} (ref 1–2)
ALP LIVER SERPL-CCNC: 120 U/L
ALT SERPL-CCNC: 40 U/L
ANION GAP SERPL CALC-SCNC: 7 MMOL/L (ref 0–18)
AST SERPL-CCNC: 32 U/L (ref 15–37)
BASOPHILS # BLD AUTO: 0.06 X10(3) UL (ref 0–0.2)
BASOPHILS NFR BLD AUTO: 1 %
BILIRUB SERPL-MCNC: 0.3 MG/DL (ref 0.1–2)
BUN BLD-MCNC: 19 MG/DL (ref 7–18)
BUN/CREAT SERPL: 22.1 (ref 10–20)
CALCIUM BLD-MCNC: 8.9 MG/DL (ref 8.5–10.1)
CHLORIDE SERPL-SCNC: 105 MMOL/L (ref 98–112)
CO2 SERPL-SCNC: 28 MMOL/L (ref 21–32)
CREAT BLD-MCNC: 0.86 MG/DL
DEPRECATED HBV CORE AB SER IA-ACNC: 61.1 NG/ML
DEPRECATED RDW RBC AUTO: 48.2 FL (ref 35.1–46.3)
EOSINOPHIL # BLD AUTO: 0.25 X10(3) UL (ref 0–0.7)
EOSINOPHIL NFR BLD AUTO: 4.2 %
ERYTHROCYTE [DISTWIDTH] IN BLOOD BY AUTOMATED COUNT: 13.8 % (ref 11–15)
EST. AVERAGE GLUCOSE BLD GHB EST-MCNC: 123 MG/DL (ref 68–126)
FASTING STATUS PATIENT QL REPORTED: YES
FSH SERPL-ACNC: 63.5 MIU/ML
GFR SERPLBLD BASED ON 1.73 SQ M-ARVRAT: 79 ML/MIN/1.73M2 (ref 60–?)
GLOBULIN PLAS-MCNC: 3.2 G/DL (ref 2.8–4.4)
GLUCOSE BLD-MCNC: 106 MG/DL (ref 70–99)
HBA1C MFR BLD: 5.9 % (ref ?–5.7)
HCT VFR BLD AUTO: 44.3 %
HGB BLD-MCNC: 14.3 G/DL
IMM GRANULOCYTES # BLD AUTO: 0.02 X10(3) UL (ref 0–1)
IMM GRANULOCYTES NFR BLD: 0.3 %
INSULIN SERPL-ACNC: 4.4 MU/L (ref 3–25)
IRON SATN MFR SERPL: 11 %
IRON SERPL-MCNC: 45 UG/DL
LYMPHOCYTES # BLD AUTO: 1.83 X10(3) UL (ref 1–4)
LYMPHOCYTES NFR BLD AUTO: 30.9 %
MCH RBC QN AUTO: 30.7 PG (ref 26–34)
MCHC RBC AUTO-ENTMCNC: 32.3 G/DL (ref 31–37)
MCV RBC AUTO: 95.1 FL
MONOCYTES # BLD AUTO: 0.55 X10(3) UL (ref 0.1–1)
MONOCYTES NFR BLD AUTO: 9.3 %
NEUTROPHILS # BLD AUTO: 3.21 X10 (3) UL (ref 1.5–7.7)
NEUTROPHILS # BLD AUTO: 3.21 X10(3) UL (ref 1.5–7.7)
NEUTROPHILS NFR BLD AUTO: 54.3 %
OSMOLALITY SERPL CALC.SUM OF ELEC: 293 MOSM/KG (ref 275–295)
PLATELET # BLD AUTO: 251 10(3)UL (ref 150–450)
POTASSIUM SERPL-SCNC: 4.3 MMOL/L (ref 3.5–5.1)
PROGEST SERPL-MCNC: 0.56 NG/ML
PROT SERPL-MCNC: 7.3 G/DL (ref 6.4–8.2)
RBC # BLD AUTO: 4.66 X10(6)UL
SODIUM SERPL-SCNC: 140 MMOL/L (ref 136–145)
T3FREE SERPL-MCNC: 2.2 PG/ML (ref 2.4–4.2)
T4 FREE SERPL-MCNC: 0.8 NG/DL (ref 0.8–1.7)
TIBC SERPL-MCNC: 419 UG/DL (ref 240–450)
TRANSFERRIN SERPL-MCNC: 281 MG/DL (ref 200–360)
TSI SER-ACNC: 3.12 MIU/ML (ref 0.36–3.74)
WBC # BLD AUTO: 5.9 X10(3) UL (ref 4–11)

## 2022-11-08 PROCEDURE — 84144 ASSAY OF PROGESTERONE: CPT

## 2022-11-08 PROCEDURE — 84439 ASSAY OF FREE THYROXINE: CPT

## 2022-11-08 PROCEDURE — 84481 FREE ASSAY (FT-3): CPT

## 2022-11-08 PROCEDURE — 83036 HEMOGLOBIN GLYCOSYLATED A1C: CPT

## 2022-11-08 PROCEDURE — 85025 COMPLETE CBC W/AUTO DIFF WBC: CPT

## 2022-11-08 PROCEDURE — 36415 COLL VENOUS BLD VENIPUNCTURE: CPT

## 2022-11-08 PROCEDURE — 83540 ASSAY OF IRON: CPT

## 2022-11-08 PROCEDURE — 82728 ASSAY OF FERRITIN: CPT

## 2022-11-08 PROCEDURE — 84403 ASSAY OF TOTAL TESTOSTERONE: CPT

## 2022-11-08 PROCEDURE — 84466 ASSAY OF TRANSFERRIN: CPT

## 2022-11-08 PROCEDURE — 84443 ASSAY THYROID STIM HORMONE: CPT

## 2022-11-08 PROCEDURE — 84402 ASSAY OF FREE TESTOSTERONE: CPT

## 2022-11-08 PROCEDURE — 82671 ASSAY OF ESTROGENS: CPT

## 2022-11-08 PROCEDURE — 83001 ASSAY OF GONADOTROPIN (FSH): CPT

## 2022-11-08 PROCEDURE — 83525 ASSAY OF INSULIN: CPT

## 2022-11-08 PROCEDURE — 80053 COMPREHEN METABOLIC PANEL: CPT

## 2022-11-12 LAB
ESTRADIOL BY TMS: 3.3 PG/ML
ESTROGENS TOTAL CALCULATION: 11.1 PG/ML
ESTRONE BY TMS: 7.8 PG/ML

## 2022-11-18 LAB
TESTOSTERONE, FREE, S: 0.15 NG/DL
TESTOSTERONE, TOTAL, S: 17 NG/DL

## 2022-12-01 ENCOUNTER — TELEPHONE (OUTPATIENT)
Dept: FAMILY MEDICINE CLINIC | Facility: CLINIC | Age: 57
End: 2022-12-01

## 2022-12-01 NOTE — TELEPHONE ENCOUNTER
LMTCB to reschedule appt from 1/13 to 1/14    PSR-if pt calls back, please offer appt on Sat.  Jan. 14. MD is not available on Jan. 13.

## 2023-01-14 ENCOUNTER — TELEMEDICINE (OUTPATIENT)
Dept: INTEGRATIVE MEDICINE | Facility: CLINIC | Age: 58
End: 2023-01-14
Payer: COMMERCIAL

## 2023-01-14 DIAGNOSIS — Z78.0 MENOPAUSE: ICD-10-CM

## 2023-01-14 DIAGNOSIS — R79.89 ABNORMAL TSH: Primary | ICD-10-CM

## 2023-01-14 DIAGNOSIS — R53.83 OTHER FATIGUE: ICD-10-CM

## 2023-01-14 DIAGNOSIS — R73.01 ELEVATED FASTING BLOOD SUGAR: ICD-10-CM

## 2023-01-14 PROCEDURE — 99214 OFFICE O/P EST MOD 30 MIN: CPT | Performed by: FAMILY MEDICINE

## 2023-03-21 ENCOUNTER — LAB ENCOUNTER (OUTPATIENT)
Dept: LAB | Facility: HOSPITAL | Age: 58
End: 2023-03-21
Attending: FAMILY MEDICINE
Payer: COMMERCIAL

## 2023-03-21 DIAGNOSIS — R79.89 ABNORMAL TSH: ICD-10-CM

## 2023-03-21 LAB
T3FREE SERPL-MCNC: 1.85 PG/ML (ref 2.4–4.2)
T4 FREE SERPL-MCNC: 0.7 NG/DL (ref 0.8–1.7)
TSI SER-ACNC: 4.96 MIU/ML (ref 0.36–3.74)

## 2023-03-21 PROCEDURE — 84482 T3 REVERSE: CPT | Performed by: FAMILY MEDICINE

## 2023-03-22 ENCOUNTER — LAB ENCOUNTER (OUTPATIENT)
Dept: LAB | Facility: HOSPITAL | Age: 58
End: 2023-03-22
Attending: PHYSICIAN ASSISTANT
Payer: COMMERCIAL

## 2023-03-22 ENCOUNTER — OFFICE VISIT (OUTPATIENT)
Dept: INTEGRATIVE MEDICINE | Facility: CLINIC | Age: 58
End: 2023-03-22
Payer: COMMERCIAL

## 2023-03-22 VITALS
DIASTOLIC BLOOD PRESSURE: 66 MMHG | OXYGEN SATURATION: 99 % | HEIGHT: 65 IN | BODY MASS INDEX: 19.33 KG/M2 | WEIGHT: 116 LBS | HEART RATE: 54 BPM | SYSTOLIC BLOOD PRESSURE: 108 MMHG

## 2023-03-22 DIAGNOSIS — R68.89 COLD INTOLERANCE: ICD-10-CM

## 2023-03-22 DIAGNOSIS — R14.0 ABDOMINAL BLOATING: ICD-10-CM

## 2023-03-22 DIAGNOSIS — R53.82 CHRONIC FATIGUE: ICD-10-CM

## 2023-03-22 DIAGNOSIS — E03.9 HYPOTHYROIDISM, UNSPECIFIED TYPE: ICD-10-CM

## 2023-03-22 DIAGNOSIS — E03.9 HYPOTHYROIDISM, UNSPECIFIED TYPE: Primary | ICD-10-CM

## 2023-03-22 LAB
THYROGLOB SERPL-MCNC: <15 U/ML (ref ?–60)
THYROPEROXIDASE AB SERPL-ACNC: 46 U/ML (ref ?–60)

## 2023-03-22 PROCEDURE — 86628 CANDIDA ANTIBODY: CPT | Performed by: PHYSICIAN ASSISTANT

## 2023-03-22 PROCEDURE — 3078F DIAST BP <80 MM HG: CPT | Performed by: PHYSICIAN ASSISTANT

## 2023-03-22 PROCEDURE — 3008F BODY MASS INDEX DOCD: CPT | Performed by: PHYSICIAN ASSISTANT

## 2023-03-22 PROCEDURE — 86376 MICROSOMAL ANTIBODY EACH: CPT | Performed by: PHYSICIAN ASSISTANT

## 2023-03-22 PROCEDURE — 86800 THYROGLOBULIN ANTIBODY: CPT | Performed by: PHYSICIAN ASSISTANT

## 2023-03-22 PROCEDURE — 99214 OFFICE O/P EST MOD 30 MIN: CPT | Performed by: PHYSICIAN ASSISTANT

## 2023-03-22 PROCEDURE — 3074F SYST BP LT 130 MM HG: CPT | Performed by: PHYSICIAN ASSISTANT

## 2023-03-22 RX ORDER — LEVOTHYROXINE AND LIOTHYRONINE 9.5; 2.25 UG/1; UG/1
TABLET ORAL
Qty: 60 TABLET | Refills: 0 | Status: SHIPPED | OUTPATIENT
Start: 2023-03-22

## 2023-03-25 LAB — TRIIODOTHYRONINE, REVERSE: 7.5 NG/DL

## 2023-03-26 LAB
CANDIDA ANTIBODY IGA: 1.3 EV
CANDIDA ANTIBODY IGG: 0.74 EV
CANDIDA ANTIBODY IGM: 0.4 EV

## 2023-04-14 ENCOUNTER — ORDER TRANSCRIPTION (OUTPATIENT)
Dept: INTEGRATIVE MEDICINE | Facility: CLINIC | Age: 58
End: 2023-04-14

## 2023-04-14 DIAGNOSIS — E03.9 HYPOTHYROIDISM, UNSPECIFIED TYPE: Primary | ICD-10-CM

## 2023-04-18 ENCOUNTER — LAB ENCOUNTER (OUTPATIENT)
Dept: LAB | Facility: HOSPITAL | Age: 58
End: 2023-04-18
Attending: PHYSICIAN ASSISTANT
Payer: COMMERCIAL

## 2023-04-18 DIAGNOSIS — E03.9 HYPOTHYROIDISM, UNSPECIFIED TYPE: ICD-10-CM

## 2023-04-18 LAB
T3FREE SERPL-MCNC: 2.52 PG/ML (ref 2.4–4.2)
T4 FREE SERPL-MCNC: 0.9 NG/DL (ref 0.8–1.7)
TSI SER-ACNC: 2.29 MIU/ML (ref 0.36–3.74)

## 2023-04-18 PROCEDURE — 84482 T3 REVERSE: CPT | Performed by: PHYSICIAN ASSISTANT

## 2023-04-18 PROCEDURE — 84439 ASSAY OF FREE THYROXINE: CPT | Performed by: PHYSICIAN ASSISTANT

## 2023-04-18 PROCEDURE — 84481 FREE ASSAY (FT-3): CPT | Performed by: PHYSICIAN ASSISTANT

## 2023-04-18 PROCEDURE — 84443 ASSAY THYROID STIM HORMONE: CPT | Performed by: PHYSICIAN ASSISTANT

## 2023-04-19 DIAGNOSIS — E03.9 HYPOTHYROIDISM, UNSPECIFIED TYPE: Primary | ICD-10-CM

## 2023-04-19 RX ORDER — LEVOTHYROXINE AND LIOTHYRONINE 9.5; 2.25 UG/1; UG/1
TABLET ORAL
Qty: 60 TABLET | Refills: 0 | Status: SHIPPED | OUTPATIENT
Start: 2023-04-19

## 2023-05-04 LAB — REVERSE T3: 17 NG/DL

## 2023-05-11 ENCOUNTER — OFFICE VISIT (OUTPATIENT)
Dept: INTEGRATIVE MEDICINE | Facility: CLINIC | Age: 58
End: 2023-05-11
Payer: COMMERCIAL

## 2023-05-11 VITALS
DIASTOLIC BLOOD PRESSURE: 64 MMHG | SYSTOLIC BLOOD PRESSURE: 98 MMHG | WEIGHT: 117 LBS | OXYGEN SATURATION: 97 % | HEIGHT: 65 IN | BODY MASS INDEX: 19.49 KG/M2 | HEART RATE: 64 BPM

## 2023-05-11 DIAGNOSIS — R14.0 ABDOMINAL BLOATING: ICD-10-CM

## 2023-05-11 DIAGNOSIS — E03.9 HYPOTHYROIDISM, UNSPECIFIED TYPE: Primary | ICD-10-CM

## 2023-05-11 DIAGNOSIS — R53.82 CHRONIC FATIGUE: ICD-10-CM

## 2023-05-11 DIAGNOSIS — B37.9 CANDIDIASIS: ICD-10-CM

## 2023-05-11 DIAGNOSIS — I73.00 RAYNAUD'S DISEASE WITHOUT GANGRENE: ICD-10-CM

## 2023-05-11 PROCEDURE — 3074F SYST BP LT 130 MM HG: CPT | Performed by: PHYSICIAN ASSISTANT

## 2023-05-11 PROCEDURE — 99214 OFFICE O/P EST MOD 30 MIN: CPT | Performed by: PHYSICIAN ASSISTANT

## 2023-05-11 PROCEDURE — 3008F BODY MASS INDEX DOCD: CPT | Performed by: PHYSICIAN ASSISTANT

## 2023-05-11 PROCEDURE — 3078F DIAST BP <80 MM HG: CPT | Performed by: PHYSICIAN ASSISTANT

## 2023-05-11 RX ORDER — LEVOTHYROXINE AND LIOTHYRONINE 9.5; 2.25 UG/1; UG/1
TABLET ORAL
Qty: 90 TABLET | Refills: 2 | Status: SHIPPED | OUTPATIENT
Start: 2023-05-11

## 2023-06-01 NOTE — PROCEDURES
Anesthesia Post-op Note    Patient: Augustin Mckenna Jr.  Procedure(s) Performed: DISCECTOMY SPINE C4-C7 ANTERIOR APPROACH, WITH FUSION  Anesthesia type: General    Vitals Value Taken Time   Temp 36.2 06/01/23 1700   Pulse 106 06/01/23 1700   Resp 19 06/01/23 1700   SpO2 100 % 06/01/23 1700   /78 06/01/23 1700   Vitals shown include unvalidated device data.      Patient Location: PACU Phase 1  Post-op Vital Signs:stable  Level of Consciousness: sedated  Respiratory Status: spontaneous ventilation, unassisted, nasal cannula and oral airway  Cardiovascular blood pressure returned to baseline  Hydration: euvolemic  Pain Management: well controlled  Handoff: Handoff to receiving clinician was performed and questions were answered  Vomiting: none  Nausea: None  Airway Patency:patent  Post-op Assessment: no complications, patient tolerated procedure well, no evidence of recall, dentition within defined limits and No Corneal Abrasion      No notable events documented.   The patient is here for a caudal JANY done under ultrasound guidance. The patient was placed in the prone position and under ultrasound guidance the sacral hiatus was identified.   The skin was cleaned with betadyne swabs x 3 and anesthetized with 1-2 ml of

## 2023-06-21 NOTE — PATIENT INSTRUCTIONS
7 (severe pain) As of October 6th 2014, the Drug Enforcement Agency [facility]) is reclassifying all hydrocodone combination medications from Schedule III to Schedule II. This includes medications such as Norco, Vicodin, Lortab, Zohydro, and Vicoprofen.     What this means for y will continue with her home exercise program.    She will plan on having a left S1 TFESI next week, but if the pain continues to improve, then she will cancel it. The patient does not need any pain medications at this time.     She does not want the oral

## 2023-06-26 ENCOUNTER — LAB ENCOUNTER (OUTPATIENT)
Dept: LAB | Age: 58
End: 2023-06-26
Attending: PHYSICIAN ASSISTANT
Payer: COMMERCIAL

## 2023-06-26 DIAGNOSIS — E03.9 HYPOTHYROIDISM, UNSPECIFIED TYPE: ICD-10-CM

## 2023-06-26 LAB
T3FREE SERPL-MCNC: 2.9 PG/ML (ref 2.4–4.2)
T4 FREE SERPL-MCNC: 0.8 NG/DL (ref 0.8–1.7)
TSI SER-ACNC: 0.92 MIU/ML (ref 0.36–3.74)

## 2023-06-26 PROCEDURE — 84482 T3 REVERSE: CPT | Performed by: PHYSICIAN ASSISTANT

## 2023-06-26 PROCEDURE — 84481 FREE ASSAY (FT-3): CPT | Performed by: PHYSICIAN ASSISTANT

## 2023-06-26 PROCEDURE — 84443 ASSAY THYROID STIM HORMONE: CPT | Performed by: PHYSICIAN ASSISTANT

## 2023-06-26 PROCEDURE — 84439 ASSAY OF FREE THYROXINE: CPT | Performed by: PHYSICIAN ASSISTANT

## 2023-06-27 ENCOUNTER — OFFICE VISIT (OUTPATIENT)
Dept: INTEGRATIVE MEDICINE | Facility: CLINIC | Age: 58
End: 2023-06-27
Payer: COMMERCIAL

## 2023-06-27 VITALS
BODY MASS INDEX: 19.33 KG/M2 | HEIGHT: 65 IN | HEART RATE: 63 BPM | SYSTOLIC BLOOD PRESSURE: 122 MMHG | DIASTOLIC BLOOD PRESSURE: 70 MMHG | OXYGEN SATURATION: 99 % | WEIGHT: 116 LBS

## 2023-06-27 DIAGNOSIS — B37.9 CANDIDIASIS: ICD-10-CM

## 2023-06-27 DIAGNOSIS — L67.8 BRITTLE HAIR: Primary | ICD-10-CM

## 2023-06-27 DIAGNOSIS — E03.9 HYPOTHYROIDISM, UNSPECIFIED TYPE: ICD-10-CM

## 2023-06-27 DIAGNOSIS — K59.09 OTHER CONSTIPATION: ICD-10-CM

## 2023-06-27 DIAGNOSIS — R14.0 ABDOMINAL BLOATING: ICD-10-CM

## 2023-06-27 PROCEDURE — 3074F SYST BP LT 130 MM HG: CPT | Performed by: PHYSICIAN ASSISTANT

## 2023-06-27 PROCEDURE — 3078F DIAST BP <80 MM HG: CPT | Performed by: PHYSICIAN ASSISTANT

## 2023-06-27 PROCEDURE — 99214 OFFICE O/P EST MOD 30 MIN: CPT | Performed by: PHYSICIAN ASSISTANT

## 2023-06-27 PROCEDURE — 3008F BODY MASS INDEX DOCD: CPT | Performed by: PHYSICIAN ASSISTANT

## 2023-06-27 RX ORDER — LEVOTHYROXINE AND LIOTHYRONINE 19; 4.5 UG/1; UG/1
TABLET ORAL
Qty: 30 TABLET | Refills: 2 | Status: SHIPPED | OUTPATIENT
Start: 2023-06-27

## 2023-06-27 RX ORDER — LEVOTHYROXINE AND LIOTHYRONINE 9.5; 2.25 UG/1; UG/1
TABLET ORAL
Qty: 90 TABLET | Refills: 2 | Status: SHIPPED | OUTPATIENT
Start: 2023-06-27

## 2023-06-30 LAB — REVERSE T3: 16 NG/DL

## 2023-07-12 ENCOUNTER — TELEPHONE (OUTPATIENT)
Dept: INTEGRATIVE MEDICINE | Facility: CLINIC | Age: 58
End: 2023-07-12

## 2023-09-09 ENCOUNTER — LAB ENCOUNTER (OUTPATIENT)
Dept: LAB | Age: 58
End: 2023-09-09
Attending: PHYSICIAN ASSISTANT
Payer: COMMERCIAL

## 2023-09-09 DIAGNOSIS — B37.9 CANDIDIASIS: ICD-10-CM

## 2023-09-09 DIAGNOSIS — E03.9 HYPOTHYROIDISM, UNSPECIFIED TYPE: ICD-10-CM

## 2023-09-09 LAB
T3FREE SERPL-MCNC: 3.22 PG/ML (ref 2.4–4.2)
T4 FREE SERPL-MCNC: 0.8 NG/DL (ref 0.8–1.7)
TSI SER-ACNC: 0.87 MIU/ML (ref 0.36–3.74)

## 2023-09-09 PROCEDURE — 84481 FREE ASSAY (FT-3): CPT | Performed by: PHYSICIAN ASSISTANT

## 2023-09-09 PROCEDURE — 84482 T3 REVERSE: CPT | Performed by: PHYSICIAN ASSISTANT

## 2023-09-09 PROCEDURE — 84439 ASSAY OF FREE THYROXINE: CPT | Performed by: PHYSICIAN ASSISTANT

## 2023-09-09 PROCEDURE — 86628 CANDIDA ANTIBODY: CPT | Performed by: PHYSICIAN ASSISTANT

## 2023-09-09 PROCEDURE — 84443 ASSAY THYROID STIM HORMONE: CPT | Performed by: PHYSICIAN ASSISTANT

## 2023-09-12 ENCOUNTER — OFFICE VISIT (OUTPATIENT)
Dept: INTEGRATIVE MEDICINE | Facility: CLINIC | Age: 58
End: 2023-09-12
Payer: COMMERCIAL

## 2023-09-12 VITALS
HEIGHT: 65 IN | DIASTOLIC BLOOD PRESSURE: 58 MMHG | BODY MASS INDEX: 19.33 KG/M2 | HEART RATE: 63 BPM | OXYGEN SATURATION: 99 % | WEIGHT: 116 LBS | SYSTOLIC BLOOD PRESSURE: 114 MMHG

## 2023-09-12 DIAGNOSIS — R14.0 ABDOMINAL BLOATING: ICD-10-CM

## 2023-09-12 DIAGNOSIS — K59.09 OTHER CONSTIPATION: ICD-10-CM

## 2023-09-12 DIAGNOSIS — R53.83 OTHER FATIGUE: ICD-10-CM

## 2023-09-12 DIAGNOSIS — L67.8 BRITTLE HAIR: ICD-10-CM

## 2023-09-12 DIAGNOSIS — E03.9 HYPOTHYROIDISM, UNSPECIFIED TYPE: Primary | ICD-10-CM

## 2023-09-12 PROCEDURE — 99214 OFFICE O/P EST MOD 30 MIN: CPT | Performed by: PHYSICIAN ASSISTANT

## 2023-09-12 PROCEDURE — 3074F SYST BP LT 130 MM HG: CPT | Performed by: PHYSICIAN ASSISTANT

## 2023-09-12 PROCEDURE — 3078F DIAST BP <80 MM HG: CPT | Performed by: PHYSICIAN ASSISTANT

## 2023-09-12 PROCEDURE — 3008F BODY MASS INDEX DOCD: CPT | Performed by: PHYSICIAN ASSISTANT

## 2023-09-12 RX ORDER — THYROID 15 MG/1
45 TABLET ORAL DAILY
Qty: 270 TABLET | Refills: 1 | Status: SHIPPED | OUTPATIENT
Start: 2023-09-12

## 2023-09-14 LAB
CANDIDA IGA AB: NEGATIVE
CANDIDA IGG AB: NEGATIVE
CANDIDA IGM AB IGM: NEGATIVE
REVERSE T3: 13.2 NG/DL

## 2023-10-02 ENCOUNTER — TELEPHONE (OUTPATIENT)
Dept: INTEGRATIVE MEDICINE | Facility: CLINIC | Age: 58
End: 2023-10-02

## 2023-10-02 RX ORDER — THYROID 30 MG/1
TABLET ORAL
Qty: 30 TABLET | Refills: 0 | OUTPATIENT
Start: 2023-10-02

## 2023-10-02 RX ORDER — THYROID 15 MG/1
45 TABLET ORAL DAILY
Qty: 270 TABLET | Refills: 1 | Status: SHIPPED | OUTPATIENT
Start: 2023-10-11

## 2023-10-02 NOTE — TELEPHONE ENCOUNTER
[facility]  ZQ#[phone]    Received order for thyroid medication, please clarify directions;     -take x3 po daily  -take x1 30-60 min before food and other supplements     Thank you

## 2023-10-05 NOTE — TELEPHONE ENCOUNTER
Please clarify sig:  Sig:   Take 3 tablets (45 mg total) by mouth daily.  Take 1 tablet 30-60min before food and other meds/supplements

## 2023-10-06 NOTE — TELEPHONE ENCOUNTER
S/w Claudia Ward from [address] [place] [address] at [phone] informed the 2nd part of the sig: Take 1 tablet 30-60 min before food and other   meds/supplements is erroneous and please disregard. Claudia Ward voiced understanding.

## 2023-11-15 ENCOUNTER — OFFICE VISIT (OUTPATIENT)
Dept: OBGYN CLINIC | Facility: CLINIC | Age: 58
End: 2023-11-15
Payer: COMMERCIAL

## 2023-11-15 VITALS
DIASTOLIC BLOOD PRESSURE: 79 MMHG | WEIGHT: 113 LBS | HEART RATE: 64 BPM | SYSTOLIC BLOOD PRESSURE: 114 MMHG | BODY MASS INDEX: 18.83 KG/M2 | HEIGHT: 65 IN

## 2023-11-15 DIAGNOSIS — Z12.31 ENCOUNTER FOR SCREENING MAMMOGRAM FOR MALIGNANT NEOPLASM OF BREAST: ICD-10-CM

## 2023-11-15 DIAGNOSIS — Z12.4 SCREENING FOR MALIGNANT NEOPLASM OF CERVIX: ICD-10-CM

## 2023-11-15 DIAGNOSIS — N95.2 POSTMENOPAUSAL ATROPHIC VAGINITIS: ICD-10-CM

## 2023-11-15 DIAGNOSIS — Z78.0 ENCOUNTER FOR OSTEOPOROSIS SCREENING IN ASYMPTOMATIC POSTMENOPAUSAL PATIENT: ICD-10-CM

## 2023-11-15 DIAGNOSIS — Z13.820 ENCOUNTER FOR OSTEOPOROSIS SCREENING IN ASYMPTOMATIC POSTMENOPAUSAL PATIENT: ICD-10-CM

## 2023-11-15 DIAGNOSIS — Z01.419 ENCOUNTER FOR ANNUAL ROUTINE GYNECOLOGICAL EXAMINATION: Primary | ICD-10-CM

## 2023-11-15 PROCEDURE — 3008F BODY MASS INDEX DOCD: CPT | Performed by: ADVANCED PRACTICE MIDWIFE

## 2023-11-15 PROCEDURE — 3074F SYST BP LT 130 MM HG: CPT | Performed by: ADVANCED PRACTICE MIDWIFE

## 2023-11-15 PROCEDURE — 3078F DIAST BP <80 MM HG: CPT | Performed by: ADVANCED PRACTICE MIDWIFE

## 2023-11-15 PROCEDURE — 99396 PREV VISIT EST AGE 40-64: CPT | Performed by: ADVANCED PRACTICE MIDWIFE

## 2023-11-15 RX ORDER — ESTRADIOL 0.1 MG/G
CREAM VAGINAL
Qty: 1 EACH | Refills: 2 | Status: SHIPPED | OUTPATIENT
Start: 2023-11-15 | End: 2024-02-26

## 2023-11-16 LAB — HPV I/H RISK 1 DNA SPEC QL NAA+PROBE: NEGATIVE

## 2023-11-16 NOTE — PROGRESS NOTES
Courtney Barrett is a 63year old female. HPI:       Courtney Barrett is a 63year old female. [id number] No LMP recorded. (Menstrual status: Menopause). Chief Complaint   Patient presents with    Annual   Post menopause x 10 years reports some vaginal dryness and discomfort with intercourse      Relationship: monogamous male partner  Abuse: denies  Diet: well balanced  Exercise: regular   Denies excessive ETOH use, no marijuana, smoking, vapping or any non prescriptive drug use.           Pap Date: 07/14/16  Pap Result Notes: Normal        Period Cycle (Days): postmenapausal  Pap Date: 07/14/16  Pap Result Notes: Normal      HISTORY:  Past Medical History:   Diagnosis Date    Allergic rhinitis     Allergy     Back problem     C2-3 mild central, C4-5 left mild foraminal, C5-6 right mod foraminal & left mild-mod, C6-7 mild-mod diffuse bulging discs 01/31/2019    C3-4 small central, C4-5 small central herniated discs 01/31/2019    C5-6 right mod foraminal stenosis 01/31/2019    Family history of basal cell carcinoma 07/07/2014    History of atypical nevus 07/07/2014    Hyperglycemia 06/05/2017    L3-4 grade 1 spondylolisthesis 06/05/2017    L3-4 mild-mod diffuse bulging disc 06/05/2017    L4-5 mild-mod central bulging disc with annular tear 06/05/2017    L4-5 mild-mod central stenosis 10/31/2017    L5-S1 left mild-mod, L3-4 left mild foraminal stenosis 06/05/2017    L5-S1 left paracentral mild-mod herniated disc 06/05/2017    left C7 radiculitis 08/27/2018    left L5-S1 radiculitis 06/05/2017    Seizure disorder ([address]     Seizures ([address]     Stroke ([facility] [address]     Thyroid disorder       Past Surgical History:   Procedure Laterality Date    ARTHROSCOPY OF JOINT UNLISTED      KNEE SURGERY      PHILIP BIOPSY STEREO NODULE 1 SITE RIGHT (CPT=19081)  01/05/2017    benign    OTHER SURGICAL HISTORY  2005    Brain and neck    OTHER SURGICAL HISTORY Left 1983    Knee    OTHER SURGICAL HISTORY  1985    Devated septum    SPINAL SURGERY - [facility]        Family History   Problem Relation Age of Onset    Cancer Father         Stomach cancer    Hypertension Mother     Cancer Mother       Social History:   Social History     Socioeconomic History    Marital status:    Tobacco Use    Smoking status: Never    Smokeless tobacco: Never   Vaping Use    Vaping Use: Never used   Substance and Sexual Activity    Alcohol use: Yes     Alcohol/week: 0.0 standard drinks of alcohol     Comment: Beer and Wine, Occasionally;     Drug use: No   Other Topics Concern    Caffeine Concern Yes     Comment: Soda, Coffee, 2 cups; Exercise Yes     Comment: stretching and hep    History of tanning No    Pt has a pacemaker No    Pt has a defibrillator No    Reaction to local anesthetic No    Left Handed No    Right Handed Yes    Currently spends a great deal of time in the sun No    Hx of Spending [place] of Time in Sun Yes    Bad sunburns in the past Yes     Comment: once    Tanning [place]ons in the Past No    Hx of Radiation Treatments No   Social History Narrative    The patient does not use an assistive device. .      The patient does live in a home with stairs. Medications (Active prior to today's visit):  Current Outpatient Medications   Medication Sig Dispense Refill    estradiol 0.1 MG/GM Vaginal Cream Place 0.5 g vaginally daily for 14 days, THEN 0.5 g twice a week. 1 each 2    thyroid (ARMOUR THYROID) 15 MG Oral Tab Take 3 tablets (45 mg total) by mouth daily. Take 1 tablet 30-60min before food and other meds/supplements 270 tablet 1    Cetirizine-Pseudoephedrine ER (ZYRTEC-D ALLERGY & CONGESTION) 5-120 MG Oral Tablet 12 Hr Take 1 tablet by mouth 2 (two) times daily. 24 tablet 3    Probiotic Product (PROBIOTIC DAILY OR) Take by mouth daily. thyroid (ARMOUR THYROID) 15 MG Oral Tab 1 tablet 30-60min before food and other meds/supplements.  (Patient not taking: Reported on 11/15/2023) 90 tablet 2       Allergies:  No Known Allergies      ROS: Review of Systems   Constitutional: Negative. Respiratory: Negative. Cardiovascular: Negative. Endocrine: Negative. Genitourinary: Negative. Psychiatric/Behavioral: Negative. PHYSICAL EXAM:     Vitals:    11/15/23 1753   BP: 114/79   Pulse: 64     Physical Exam  Vitals reviewed. Constitutional:       General: She is not in acute distress. Appearance: Normal appearance. She is normal weight. She is not ill-appearing, toxic-appearing or diaphoretic. Cardiovascular:      Rate and Rhythm: Normal rate. Pulmonary:      Effort: Pulmonary effort is normal.   Chest:   Breasts:     Right: Normal. No swelling, bleeding, inverted nipple, mass, nipple discharge, skin change or tenderness. Left: Normal. No swelling, bleeding, inverted nipple, mass, nipple discharge, skin change or tenderness. Genitourinary:     General: Normal vulva. Labia:         Right: No rash, tenderness, lesion or injury. Left: No rash, tenderness, lesion or injury. Urethra: Prolapse present. Vagina: Erythema (pale tissues) present. Cervix: Normal.   Neurological:      Mental Status: She is alert and oriented to person, place, and time. ASSESSMENT/PLAN:      Diagnoses and all orders for this visit:    Encounter for annual routine gynecological examination    Screening for malignant neoplasm of cervix  -     ThinPrep PAP Smear; Future  -     Hpv Dna  High Risk , Thin Prep Collect; Future    Encounter for screening mammogram for malignant neoplasm of breast  -     [facility] MAGDALENA 2D+3D SCREENING BILAT (CPT=77067/75509); Future    Postmenopausal atrophic vaginitis    Encounter for osteoporosis screening in asymptomatic postmenopausal patient  -     XR DEXA BONE DENSITOMETRY (CPT=77080); Future    Other orders  -     estradiol 0.1 MG/GM Vaginal Cream; Place 0.5 g vaginally daily for 14 days, THEN 0.5 g twice a week.         Reviewed PAP guidelines recommending q 5 year screening with HPV testing. Discussed breast awareness and SBE, and recommendations regarding mammography.   Order provided for screening mammogram.  Discussed weight management and regular exercise  Use of Estrace cream reviewed  Offered patient STI screening and she declines             11/15/2023  Devin Cordoba CNM

## 2023-12-12 ENCOUNTER — OFFICE VISIT (OUTPATIENT)
Dept: INTEGRATIVE MEDICINE | Facility: CLINIC | Age: 58
End: 2023-12-12
Payer: COMMERCIAL

## 2023-12-12 VITALS
HEART RATE: 77 BPM | HEIGHT: 65 IN | WEIGHT: 114.19 LBS | OXYGEN SATURATION: 99 % | SYSTOLIC BLOOD PRESSURE: 110 MMHG | BODY MASS INDEX: 19.03 KG/M2 | DIASTOLIC BLOOD PRESSURE: 68 MMHG

## 2023-12-12 DIAGNOSIS — E61.1 LOW IRON: ICD-10-CM

## 2023-12-12 DIAGNOSIS — R63.5 WEIGHT GAIN: ICD-10-CM

## 2023-12-12 DIAGNOSIS — R73.03 PREDIABETES: ICD-10-CM

## 2023-12-12 DIAGNOSIS — K59.09 OTHER CONSTIPATION: ICD-10-CM

## 2023-12-12 DIAGNOSIS — E03.9 HYPOTHYROIDISM, UNSPECIFIED TYPE: Primary | ICD-10-CM

## 2023-12-12 DIAGNOSIS — R53.83 OTHER FATIGUE: ICD-10-CM

## 2023-12-12 PROCEDURE — 3008F BODY MASS INDEX DOCD: CPT | Performed by: PHYSICIAN ASSISTANT

## 2023-12-12 PROCEDURE — 3074F SYST BP LT 130 MM HG: CPT | Performed by: PHYSICIAN ASSISTANT

## 2023-12-12 PROCEDURE — 99214 OFFICE O/P EST MOD 30 MIN: CPT | Performed by: PHYSICIAN ASSISTANT

## 2023-12-12 PROCEDURE — 3078F DIAST BP <80 MM HG: CPT | Performed by: PHYSICIAN ASSISTANT

## 2023-12-15 ENCOUNTER — LAB ENCOUNTER (OUTPATIENT)
Dept: LAB | Facility: HOSPITAL | Age: 58
End: 2023-12-15
Attending: PHYSICIAN ASSISTANT
Payer: COMMERCIAL

## 2023-12-15 ENCOUNTER — TELEPHONE (OUTPATIENT)
Dept: OBGYN CLINIC | Facility: CLINIC | Age: 58
End: 2023-12-15

## 2023-12-15 DIAGNOSIS — R53.83 OTHER FATIGUE: ICD-10-CM

## 2023-12-15 DIAGNOSIS — R73.03 PREDIABETES: ICD-10-CM

## 2023-12-15 DIAGNOSIS — E03.9 HYPOTHYROIDISM, UNSPECIFIED TYPE: ICD-10-CM

## 2023-12-15 DIAGNOSIS — E61.1 LOW IRON: ICD-10-CM

## 2023-12-15 LAB
ALBUMIN SERPL-MCNC: 4.5 G/DL (ref 3.2–4.8)
ALBUMIN/GLOB SERPL: 1.9 {RATIO} (ref 1–2)
ALP LIVER SERPL-CCNC: 71 U/L
ALT SERPL-CCNC: 20 U/L
ANION GAP SERPL CALC-SCNC: 6 MMOL/L (ref 0–18)
AST SERPL-CCNC: 27 U/L (ref ?–34)
BASOPHILS # BLD AUTO: 0.04 X10(3) UL (ref 0–0.2)
BASOPHILS NFR BLD AUTO: 0.8 %
BILIRUB SERPL-MCNC: 0.8 MG/DL (ref 0.3–1.2)
BUN BLD-MCNC: 9 MG/DL (ref 9–23)
BUN/CREAT SERPL: 11.7 (ref 10–20)
CALCIUM BLD-MCNC: 9.4 MG/DL (ref 8.7–10.4)
CHLORIDE SERPL-SCNC: 104 MMOL/L (ref 98–112)
CO2 SERPL-SCNC: 29 MMOL/L (ref 21–32)
CREAT BLD-MCNC: 0.77 MG/DL
DEPRECATED HBV CORE AB SER IA-ACNC: 61 NG/ML
DEPRECATED RDW RBC AUTO: 47.8 FL (ref 35.1–46.3)
EGFRCR SERPLBLD CKD-EPI 2021: 90 ML/MIN/1.73M2 (ref 60–?)
EOSINOPHIL # BLD AUTO: 0.17 X10(3) UL (ref 0–0.7)
EOSINOPHIL NFR BLD AUTO: 3.4 %
ERYTHROCYTE [DISTWIDTH] IN BLOOD BY AUTOMATED COUNT: 13.8 % (ref 11–15)
EST. AVERAGE GLUCOSE BLD GHB EST-MCNC: 114 MG/DL (ref 68–126)
FASTING STATUS PATIENT QL REPORTED: YES
GLOBULIN PLAS-MCNC: 2.4 G/DL (ref 2.8–4.4)
GLUCOSE BLD-MCNC: 113 MG/DL (ref 70–99)
HBA1C MFR BLD: 5.6 % (ref ?–5.7)
HCT VFR BLD AUTO: 42.1 %
HGB BLD-MCNC: 13.9 G/DL
IMM GRANULOCYTES # BLD AUTO: 0.01 X10(3) UL (ref 0–1)
IMM GRANULOCYTES NFR BLD: 0.2 %
INSULIN SERPL-ACNC: 4.5 MU/L (ref 3–25)
IRON SATN MFR SERPL: 31 %
IRON SERPL-MCNC: 125 UG/DL
LYMPHOCYTES # BLD AUTO: 1.79 X10(3) UL (ref 1–4)
LYMPHOCYTES NFR BLD AUTO: 35.4 %
MCH RBC QN AUTO: 31 PG (ref 26–34)
MCHC RBC AUTO-ENTMCNC: 33 G/DL (ref 31–37)
MCV RBC AUTO: 94 FL
MONOCYTES # BLD AUTO: 0.54 X10(3) UL (ref 0.1–1)
MONOCYTES NFR BLD AUTO: 10.7 %
NEUTROPHILS # BLD AUTO: 2.5 X10 (3) UL (ref 1.5–7.7)
NEUTROPHILS # BLD AUTO: 2.5 X10(3) UL (ref 1.5–7.7)
NEUTROPHILS NFR BLD AUTO: 49.5 %
OSMOLALITY SERPL CALC.SUM OF ELEC: 287 MOSM/KG (ref 275–295)
PLATELET # BLD AUTO: 251 10(3)UL (ref 150–450)
POTASSIUM SERPL-SCNC: 4.2 MMOL/L (ref 3.5–5.1)
PROT SERPL-MCNC: 6.9 G/DL (ref 5.7–8.2)
RBC # BLD AUTO: 4.48 X10(6)UL
SODIUM SERPL-SCNC: 139 MMOL/L (ref 136–145)
T3FREE SERPL-MCNC: 2.82 PG/ML (ref 2.4–4.2)
T4 FREE SERPL-MCNC: 1.1 NG/DL (ref 0.8–1.7)
TIBC SERPL-MCNC: 404 UG/DL (ref 250–425)
TRANSFERRIN SERPL-MCNC: 271 MG/DL (ref 250–380)
TSI SER-ACNC: 3.35 MIU/ML (ref 0.55–4.78)
VIT D+METAB SERPL-MCNC: 29 NG/ML (ref 30–100)
WBC # BLD AUTO: 5.1 X10(3) UL (ref 4–11)

## 2023-12-15 PROCEDURE — 84466 ASSAY OF TRANSFERRIN: CPT | Performed by: PHYSICIAN ASSISTANT

## 2023-12-15 PROCEDURE — 83036 HEMOGLOBIN GLYCOSYLATED A1C: CPT | Performed by: PHYSICIAN ASSISTANT

## 2023-12-15 PROCEDURE — 83525 ASSAY OF INSULIN: CPT | Performed by: PHYSICIAN ASSISTANT

## 2023-12-15 PROCEDURE — 80050 GENERAL HEALTH PANEL: CPT | Performed by: PHYSICIAN ASSISTANT

## 2023-12-15 PROCEDURE — 84481 FREE ASSAY (FT-3): CPT | Performed by: PHYSICIAN ASSISTANT

## 2023-12-15 PROCEDURE — 84482 T3 REVERSE: CPT | Performed by: PHYSICIAN ASSISTANT

## 2023-12-15 PROCEDURE — 82728 ASSAY OF FERRITIN: CPT | Performed by: PHYSICIAN ASSISTANT

## 2023-12-15 PROCEDURE — 84439 ASSAY OF FREE THYROXINE: CPT | Performed by: PHYSICIAN ASSISTANT

## 2023-12-15 PROCEDURE — 83540 ASSAY OF IRON: CPT | Performed by: PHYSICIAN ASSISTANT

## 2023-12-15 PROCEDURE — 82306 VITAMIN D 25 HYDROXY: CPT | Performed by: PHYSICIAN ASSISTANT

## 2023-12-18 LAB — REVERSE T3: 12.4 NG/DL

## 2023-12-18 NOTE — PROGRESS NOTES
Results reviewed as follows:    Hi Tricia     Your results were reviewed and most of your labs look great, but please note the following:    -Your vitamin D did improve some but is still quite low. If you are not already supplementing, I would recommend starting the following:  Vitamin D3 + K from [employer]  Take one softgel daily for 1 week, then increase to 2 capsules for 2 weeks. Then reduce and stay on 1 capsule daily until we recheck again. This supports energy levels as well as hormone balancing.    -Your hemoglobin A1c has decreased and is now out of prediabetic range.    -Your thyroid hormones have dipped a little bit. Did you hold the medication prior to the lab testing this time? Do you remember if you held it the last couple blood draws? This could be causing the difference in levels. Regardless, the [employer] supplement discussed at the office visit will help support proper thyroid function.    -Your iron levels have improved, but continue to eat iron rich foods so it does not reduce any lower. Please follow up as scheduled, or give us a call if you have any questions.      Yours in health,  Augustin Andrew PA-C

## 2023-12-25 ENCOUNTER — APPOINTMENT (OUTPATIENT)
Dept: CT IMAGING | Facility: HOSPITAL | Age: 58
End: 2023-12-25
Attending: EMERGENCY MEDICINE
Payer: COMMERCIAL

## 2023-12-25 ENCOUNTER — HOSPITAL ENCOUNTER (OUTPATIENT)
Age: 58
Discharge: ANOTHER HOSPITAL | End: 2023-12-25
Attending: EMERGENCY MEDICINE
Payer: COMMERCIAL

## 2023-12-25 ENCOUNTER — HOSPITAL ENCOUNTER (INPATIENT)
Facility: HOSPITAL | Age: 58
LOS: 4 days | Discharge: HOME | End: 2023-12-29
Attending: EMERGENCY MEDICINE | Admitting: HOSPITALIST
Payer: COMMERCIAL

## 2023-12-25 VITALS
SYSTOLIC BLOOD PRESSURE: 144 MMHG | TEMPERATURE: 99 F | DIASTOLIC BLOOD PRESSURE: 73 MMHG | OXYGEN SATURATION: 100 % | RESPIRATION RATE: 18 BRPM | HEART RATE: 78 BPM

## 2023-12-25 DIAGNOSIS — R10.9 RIGHT SIDED ABDOMINAL PAIN: Primary | ICD-10-CM

## 2023-12-25 DIAGNOSIS — K57.20 PERFORATED DIVERTICULUM OF LARGE INTESTINE: Primary | ICD-10-CM

## 2023-12-25 LAB
ALBUMIN SERPL-MCNC: 4.7 G/DL (ref 3.2–4.8)
ALBUMIN/GLOB SERPL: 1.8 {RATIO} (ref 1–2)
ALP LIVER SERPL-CCNC: 77 U/L
ALT SERPL-CCNC: 30 U/L
ANION GAP SERPL CALC-SCNC: 8 MMOL/L (ref 0–18)
AST SERPL-CCNC: 45 U/L (ref ?–34)
BASOPHILS # BLD AUTO: 0.03 X10(3) UL (ref 0–0.2)
BASOPHILS NFR BLD AUTO: 0.2 %
BILIRUB SERPL-MCNC: 1.1 MG/DL (ref 0.3–1.2)
BUN BLD-MCNC: 8 MG/DL (ref 9–23)
BUN/CREAT SERPL: 11.6 (ref 10–20)
CALCIUM BLD-MCNC: 9.3 MG/DL (ref 8.7–10.4)
CHLORIDE SERPL-SCNC: 100 MMOL/L (ref 98–112)
CO2 SERPL-SCNC: 28 MMOL/L (ref 21–32)
CREAT BLD-MCNC: 0.69 MG/DL
DEPRECATED RDW RBC AUTO: 48.4 FL (ref 35.1–46.3)
EGFRCR SERPLBLD CKD-EPI 2021: 101 ML/MIN/1.73M2 (ref 60–?)
EOSINOPHIL # BLD AUTO: 0.05 X10(3) UL (ref 0–0.7)
EOSINOPHIL NFR BLD AUTO: 0.4 %
ERYTHROCYTE [DISTWIDTH] IN BLOOD BY AUTOMATED COUNT: 13.8 % (ref 11–15)
GLOBULIN PLAS-MCNC: 2.6 G/DL (ref 2.8–4.4)
GLUCOSE BLD-MCNC: 103 MG/DL (ref 70–99)
HCT VFR BLD AUTO: 39.2 %
HGB BLD-MCNC: 12.7 G/DL
IMM GRANULOCYTES # BLD AUTO: 0.04 X10(3) UL (ref 0–1)
IMM GRANULOCYTES NFR BLD: 0.3 %
LIPASE SERPL-CCNC: 28 U/L (ref 13–75)
LYMPHOCYTES # BLD AUTO: 1.93 X10(3) UL (ref 1–4)
LYMPHOCYTES NFR BLD AUTO: 15.4 %
MCH RBC QN AUTO: 30.6 PG (ref 26–34)
MCHC RBC AUTO-ENTMCNC: 32.4 G/DL (ref 31–37)
MCV RBC AUTO: 94.5 FL
MONOCYTES # BLD AUTO: 1.56 X10(3) UL (ref 0.1–1)
MONOCYTES NFR BLD AUTO: 12.5 %
NEUTROPHILS # BLD AUTO: 8.89 X10 (3) UL (ref 1.5–7.7)
NEUTROPHILS # BLD AUTO: 8.89 X10(3) UL (ref 1.5–7.7)
NEUTROPHILS NFR BLD AUTO: 71.2 %
OSMOLALITY SERPL CALC.SUM OF ELEC: 281 MOSM/KG (ref 275–295)
PLATELET # BLD AUTO: 231 10(3)UL (ref 150–450)
POTASSIUM SERPL-SCNC: 3.9 MMOL/L (ref 3.5–5.1)
PROT SERPL-MCNC: 7.3 G/DL (ref 5.7–8.2)
RBC # BLD AUTO: 4.15 X10(6)UL
SODIUM SERPL-SCNC: 136 MMOL/L (ref 136–145)
WBC # BLD AUTO: 12.5 X10(3) UL (ref 4–11)

## 2023-12-25 PROCEDURE — 99254 IP/OBS CNSLTJ NEW/EST MOD 60: CPT | Performed by: SURGERY

## 2023-12-25 PROCEDURE — 99213 OFFICE O/P EST LOW 20 MIN: CPT

## 2023-12-25 PROCEDURE — 99223 1ST HOSP IP/OBS HIGH 75: CPT | Performed by: HOSPITALIST

## 2023-12-25 PROCEDURE — 74177 CT ABD & PELVIS W/CONTRAST: CPT | Performed by: EMERGENCY MEDICINE

## 2023-12-25 RX ORDER — ONDANSETRON 2 MG/ML
4 INJECTION INTRAMUSCULAR; INTRAVENOUS EVERY 6 HOURS PRN
Status: DISCONTINUED | OUTPATIENT
Start: 2023-12-25 | End: 2023-12-29

## 2023-12-25 RX ORDER — MORPHINE SULFATE 4 MG/ML
4 INJECTION, SOLUTION INTRAMUSCULAR; INTRAVENOUS EVERY 2 HOUR PRN
Status: DISCONTINUED | OUTPATIENT
Start: 2023-12-25 | End: 2023-12-29

## 2023-12-25 RX ORDER — KETOROLAC TROMETHAMINE 15 MG/ML
15 INJECTION, SOLUTION INTRAMUSCULAR; INTRAVENOUS ONCE
Status: COMPLETED | OUTPATIENT
Start: 2023-12-25 | End: 2023-12-25

## 2023-12-25 RX ORDER — ACETAMINOPHEN 500 MG
500 TABLET ORAL EVERY 4 HOURS PRN
Status: DISCONTINUED | OUTPATIENT
Start: 2023-12-25 | End: 2023-12-29

## 2023-12-25 RX ORDER — MULTIVIT-MIN/IRON FUM/FOLIC AC 7.5 MG-4
1 TABLET ORAL DAILY
COMMUNITY

## 2023-12-25 RX ORDER — DEXTROSE MONOHYDRATE, SODIUM CHLORIDE, AND POTASSIUM CHLORIDE 50; 1.49; 4.5 G/1000ML; G/1000ML; G/1000ML
INJECTION, SOLUTION INTRAVENOUS CONTINUOUS
Status: DISCONTINUED | OUTPATIENT
Start: 2023-12-25 | End: 2023-12-29

## 2023-12-25 RX ORDER — MORPHINE SULFATE 2 MG/ML
1 INJECTION, SOLUTION INTRAMUSCULAR; INTRAVENOUS EVERY 2 HOUR PRN
Status: DISCONTINUED | OUTPATIENT
Start: 2023-12-25 | End: 2023-12-29

## 2023-12-25 RX ORDER — MORPHINE SULFATE 2 MG/ML
2 INJECTION, SOLUTION INTRAMUSCULAR; INTRAVENOUS EVERY 2 HOUR PRN
Status: DISCONTINUED | OUTPATIENT
Start: 2023-12-25 | End: 2023-12-29

## 2023-12-25 RX ORDER — TEMAZEPAM 15 MG/1
15 CAPSULE ORAL NIGHTLY PRN
Status: DISCONTINUED | OUTPATIENT
Start: 2023-12-25 | End: 2023-12-29

## 2023-12-25 RX ORDER — PROCHLORPERAZINE EDISYLATE 5 MG/ML
5 INJECTION INTRAMUSCULAR; INTRAVENOUS EVERY 8 HOURS PRN
Status: DISCONTINUED | OUTPATIENT
Start: 2023-12-25 | End: 2023-12-29

## 2023-12-25 NOTE — ED INITIAL ASSESSMENT (HPI)
Presents with 3 days of abdominal \"bloating\". \"Sharp pain\" today. No fever. Some chills yesterday. Denies urinary symptoms. No N/V/D.

## 2023-12-25 NOTE — PLAN OF CARE
Brenden Romero is admitted today from ED- c/o and pain x3 days- denies N/V/D- pain right mid abdomen 5/10- gave morphin IVP, IV fluids started per order. NPO status maintained. IV zosyn as ordered. SCD's x2 per order. Educated on NPO status.    Problem: PAIN - ADULT  Goal: Verbalizes/displays adequate comfort level or patient's stated pain goal  Description: INTERVENTIONS:  - Encourage pt to monitor pain and request assistance  - Assess pain using appropriate pain scale  - Administer analgesics based on type and severity of pain and evaluate response  - Implement non-pharmacological measures as appropriate and evaluate response  - Consider cultural and social influences on pain and pain management  - Manage/alleviate anxiety  - Utilize distraction and/or relaxation techniques  - Monitor for opioid side effects  - Notify MD/LIP if interventions unsuccessful or patient reports new pain  - Anticipate increased pain with activity and pre-medicate as appropriate  Outcome: Progressing     Problem: RISK FOR INFECTION - ADULT  Goal: Absence of fever/infection during anticipated neutropenic period  Description: INTERVENTIONS  - Monitor WBC  - Administer growth factors as ordered  - Implement neutropenic guidelines  Outcome: Progressing     Problem: DISCHARGE PLANNING  Goal: Discharge to home or other facility with appropriate resources  Description: INTERVENTIONS:  - Identify barriers to discharge w/pt and caregiver  - Include patient/family/discharge partner in discharge planning  - Arrange for needed discharge resources and transportation as appropriate  - Identify discharge learning needs (meds, wound care, etc)  - Arrange for interpreters to assist at discharge as needed  - Consider post-discharge preferences of patient/family/discharge partner  - Complete POLST form as appropriate  - Assess patient's ability to be responsible for managing their own health  - Refer to Case Management Department for coordinating discharge planning if the patient needs post-hospital services based on physician/LIP order or complex needs related to functional status, cognitive ability or social support system  Outcome: Progressing     Problem: GASTROINTESTINAL - ADULT  Goal: Minimal or absence of nausea and vomiting  Description: INTERVENTIONS:  - Maintain adequate hydration with IV or PO as ordered and tolerated  - Nasogastric tube to low intermittent suction as ordered  - Evaluate effectiveness of ordered antiemetic medications  - Provide nonpharmacologic comfort measures as appropriate  - Advance diet as tolerated, if ordered  - Obtain nutritional consult as needed  - Evaluate fluid balance  Outcome: Progressing

## 2023-12-25 NOTE — CONSULTS
Interventional Radiology  Imaging Reviewed Consult Note    Reason for Consult Request: diverticulitis    History of Present Illness: 63year old woman presenting to ED with abdominal pain. WBC 12.5. CT abdomen/pelvis demonstrated perforated diverticulitis at the hepatic flexure with associated phelgmon. Started on IV antibiotics. IR consulted for drainage. Imaging Reviewed: CT abdomen/pelvis    Imaging Findings:          Phlegmonous changes at the hepatic flexure. No drainable collection. Recommendations: At present, no drainable collection. Recommend IV antibiotics. If abdominal pain or WBC does not improve after several days, recommend repeat CT. Discussed with ordering provider Dr. Khris Brasher. I spent 15 minutes reviewing relevant imaging/labs and discussing diagnosis and treatment plan.     Yoselin Echols MD  12/25/2023  [facility]

## 2023-12-25 NOTE — H&P
[facility]    PATIENT'S NAME: Aleshia Maza   ATTENDING PHYSICIAN: Christian Leija MD   PATIENT ACCOUNT#:   [id number]    LOCATION:  [address] [place] [address]. [address] RECORD #:   [id number]       YOB: 1965  ADMISSION DATE:       12/25/2023    HISTORY AND PHYSICAL EXAMINATION    CHIEF COMPLAINT:  Perforated ascending colon diverticulitis. HISTORY OF PRESENT ILLNESS:  The patient is a 60-year-old  female, came into the emergency department for evaluation of epigastric and right upper quadrant abdominal pain since Friday 2 days ago. CBC showed white blood cell count of 12.5 with left shift. Chemistry and liver function tests were unremarkable. CT scan of the abdomen showed perforated diverticulitis involving ascending colon near the hepatic flexure with phlegmonous collection, predominantly of fat stranding with some air adjacent to the hepatic flexure measuring 6.2 cm appears to be a tract extending from this collection to colon at the hepatic flexure. Extensive colon diverticulum. Patient was started on IV Zosyn, and she will be admitted to the hospital for further management. PAST MEDICAL HISTORY:  Degenerative joint disease of cervical and lumbar spine, hypothyroidism, diverticulosis. She had a colonoscopy done in August 2022 which showed diverticulosis of the ascending colon. PAST SURGICAL HISTORY:  Chiari malformation surgical correction with suboccipital craniectomy and posterior cervical laminectomy. She had right breast biopsy, left knee arthroscopic procedure, and nasal septoplasty. MEDICATIONS:  Please see medication reconciliation list.      ALLERGIES:  No known drug allergies. FAMILY HISTORY:  Mother had hypertension. Father had colon cancer. SOCIAL HISTORY:  No tobacco, alcohol, or drug use. Lives with her family. Independent for basic activities of daily living.      REVIEW OF SYSTEMS:  Patient described epigastric upper abdominal pain right upper quadrant associated with nausea, poor appetite. She had chills and subjective fevers last night. She felt nauseated, but no vomiting. Other 12-point review of systems is negative. PHYSICAL EXAMINATION:    GENERAL:  Alert and oriented to time, place, and person. Moderate distress. VITAL SIGNS:  Temperature 99.4, pulse 80, respiratory rate 18, blood pressure 145/78, pulse ox 99% on room air. HEENT:  Atraumatic. Oropharynx clear. Dry mucous membranes. Ears, nose normal.  Eyes:  Anicteric sclerae. NECK:  Supple. No lymphadenopathy. Trachea midline. Full range of motion. LUNGS:  Clear to auscultation bilaterally. Normal respiratory effort. HEART:  Regular rate, rhythm. S1 and S2 auscultated. No murmur. ABDOMEN:  Soft, nondistended. Tenderness noted right abdomen, right upper quadrant area. No guarding but positive rebound tenderness. Hypoactive bowel sounds. EXTREMITIES:  No peripheral edema, clubbing, or cyanosis. NEUROLOGIC:  Motor and sensory intact. ASSESSMENT AND PLAN:  Perforated ascending colon diverticulitis with phlegmonous gas collection, fix point 2 cm near hepatic flexure. Patient will be admitted to general medical floor. IV Zosyn. IV fluids, n.p.o. except sips of clear liquids. Pain and nausea control. General surgery and interventional radiology consult. Monitor hemodynamic status. Further recommendations to follow.      Dictated By Sarthak Rankin MD  d: 12/25/2023 16:24:50  t: 12/25/2023 16:33:59  Job [id number]  [place]/

## 2023-12-25 NOTE — PROGRESS NOTES
General surgery consult    Chart reviewed full dictated consult to follow    Acute diverticulitis with phlegmon. Plan n.p.o., IV antibiotics.   Further recommendations pending her clinical course

## 2023-12-25 NOTE — ED QUICK NOTES
Orders for admission, patient is aware of plan and ready to go upstairs. Any questions, please call ED RN Geovany Bell at extension [phone].      Patient Covid vaccination status: Fully vaccinated     COVID Test Ordered in ED: None    COVID Suspicion at Admission: N/A    Running Infusions:  None    Mental Status/LOC at time of transport: A/Ox4    Other pertinent information:   CIWA score: N/A   NIH score:  N/A

## 2023-12-25 NOTE — ED INITIAL ASSESSMENT (HPI)
Pt came in for lower abdominal pain since Friday started after eating with feeling bloated. Per pt bloating subsided but the lower abdominal pain persisted. Denies N/V/D. A/Ox  4, breathing unlabored. History of back problem, seizures, stroke.

## 2023-12-26 LAB
ANION GAP SERPL CALC-SCNC: 3 MMOL/L (ref 0–18)
BASOPHILS # BLD AUTO: 0.01 X10(3) UL (ref 0–0.2)
BASOPHILS NFR BLD AUTO: 0.1 %
BUN BLD-MCNC: 8 MG/DL (ref 9–23)
BUN/CREAT SERPL: 10 (ref 10–20)
CALCIUM BLD-MCNC: 8.6 MG/DL (ref 8.7–10.4)
CHLORIDE SERPL-SCNC: 104 MMOL/L (ref 98–112)
CO2 SERPL-SCNC: 30 MMOL/L (ref 21–32)
CREAT BLD-MCNC: 0.8 MG/DL
DEPRECATED RDW RBC AUTO: 46.7 FL (ref 35.1–46.3)
EGFRCR SERPLBLD CKD-EPI 2021: 86 ML/MIN/1.73M2 (ref 60–?)
EOSINOPHIL # BLD AUTO: 0.14 X10(3) UL (ref 0–0.7)
EOSINOPHIL NFR BLD AUTO: 2 %
ERYTHROCYTE [DISTWIDTH] IN BLOOD BY AUTOMATED COUNT: 13.7 % (ref 11–15)
GLUCOSE BLD-MCNC: 128 MG/DL (ref 70–99)
HCT VFR BLD AUTO: 35.5 %
HGB BLD-MCNC: 11.8 G/DL
IMM GRANULOCYTES # BLD AUTO: 0.02 X10(3) UL (ref 0–1)
IMM GRANULOCYTES NFR BLD: 0.3 %
LYMPHOCYTES # BLD AUTO: 1.51 X10(3) UL (ref 1–4)
LYMPHOCYTES NFR BLD AUTO: 21.7 %
MCH RBC QN AUTO: 31.2 PG (ref 26–34)
MCHC RBC AUTO-ENTMCNC: 33.2 G/DL (ref 31–37)
MCV RBC AUTO: 93.9 FL
MONOCYTES # BLD AUTO: 0.88 X10(3) UL (ref 0.1–1)
MONOCYTES NFR BLD AUTO: 12.6 %
NEUTROPHILS # BLD AUTO: 4.4 X10 (3) UL (ref 1.5–7.7)
NEUTROPHILS # BLD AUTO: 4.4 X10(3) UL (ref 1.5–7.7)
NEUTROPHILS NFR BLD AUTO: 63.3 %
OSMOLALITY SERPL CALC.SUM OF ELEC: 284 MOSM/KG (ref 275–295)
PLATELET # BLD AUTO: 177 10(3)UL (ref 150–450)
POTASSIUM SERPL-SCNC: 4.3 MMOL/L (ref 3.5–5.1)
RBC # BLD AUTO: 3.78 X10(6)UL
SODIUM SERPL-SCNC: 137 MMOL/L (ref 136–145)
WBC # BLD AUTO: 7 X10(3) UL (ref 4–11)

## 2023-12-26 PROCEDURE — 99233 SBSQ HOSP IP/OBS HIGH 50: CPT | Performed by: INTERNAL MEDICINE

## 2023-12-26 RX ORDER — THYROID 30 MG/1
15 TABLET ORAL DAILY
Status: DISCONTINUED | OUTPATIENT
Start: 2023-12-26 | End: 2023-12-26

## 2023-12-26 RX ORDER — THYROID 90 MG/1
45 TABLET ORAL DAILY
Status: DISCONTINUED | OUTPATIENT
Start: 2023-12-26 | End: 2023-12-29

## 2023-12-26 NOTE — PLAN OF CARE
Patient alert and oriented x 4. Patient denied pain. Up independently. Ambulated in hallways frequently. Voiding freely. Patients diet is clear liquids. Tolerating well. SCDs for VTE prophylaxis. Vital signs monitored. Cough and deep breathe. Bed in lowest position, call light within reach, and non skid socks in place for fall precautions. All needs within reach. Family at bedside. Rounding done by nursing staff. Plan is to continue IV abx, manage pain, and to do a repeat CT in the AM.     Problem: Patient Centered Care  Goal: Patient preferences are identified and integrated in the patient's plan of care  Description: Interventions:  - What would you like us to know as we care for you?  From home with family  - Provide timely, complete, and accurate information to patient/family  - Incorporate patient and family knowledge, values, beliefs, and cultural backgrounds into the planning and delivery of care  - Encourage patient/family to participate in care and decision-making at the level they choose  - Honor patient and family perspectives and choices  Outcome: Progressing     Problem: PAIN - ADULT  Goal: Verbalizes/displays adequate comfort level or patient's stated pain goal  Description: INTERVENTIONS:  - Encourage pt to monitor pain and request assistance  - Assess pain using appropriate pain scale  - Administer analgesics based on type and severity of pain and evaluate response  - Implement non-pharmacological measures as appropriate and evaluate response  - Consider cultural and social influences on pain and pain management  - Manage/alleviate anxiety  - Utilize distraction and/or relaxation techniques  - Monitor for opioid side effects  - Notify MD/LIP if interventions unsuccessful or patient reports new pain  - Anticipate increased pain with activity and pre-medicate as appropriate  Outcome: Progressing     Problem: RISK FOR INFECTION - ADULT  Goal: Absence of fever/infection during anticipated neutropenic period  Description: INTERVENTIONS  - Monitor WBC  - Administer growth factors as ordered  - Implement neutropenic guidelines  Outcome: Progressing     Problem: DISCHARGE PLANNING  Goal: Discharge to home or other facility with appropriate resources  Description: INTERVENTIONS:  - Identify barriers to discharge w/pt and caregiver  - Include patient/family/discharge partner in discharge planning  - Arrange for needed discharge resources and transportation as appropriate  - Identify discharge learning needs (meds, wound care, etc)  - Arrange for interpreters to assist at discharge as needed  - Consider post-discharge preferences of patient/family/discharge partner  - Complete POLST form as appropriate  - Assess patient's ability to be responsible for managing their own health  - Refer to Case Management Department for coordinating discharge planning if the patient needs post-hospital services based on physician/LIP order or complex needs related to functional status, cognitive ability or social support system  Outcome: Progressing     Problem: GASTROINTESTINAL - ADULT  Goal: Minimal or absence of nausea and vomiting  Description: INTERVENTIONS:  - Maintain adequate hydration with IV or PO as ordered and tolerated  - Nasogastric tube to low intermittent suction as ordered  - Evaluate effectiveness of ordered antiemetic medications  - Provide nonpharmacologic comfort measures as appropriate  - Advance diet as tolerated, if ordered  - Obtain nutritional consult as needed  - Evaluate fluid balance  Outcome: Progressing  Goal: Maintains or returns to baseline bowel function  Description: INTERVENTIONS:  - Assess bowel function  - Maintain adequate hydration with IV or PO as ordered and tolerated  - Evaluate effectiveness of GI medications  - Encourage mobilization and activity  - Obtain nutritional consult as needed  - Establish a toileting routine/schedule  - Consider collaborating with pharmacy to review patient's medication profile  Outcome: Progressing  Goal: Maintains adequate nutritional intake (undernourished)  Description: INTERVENTIONS:  - Monitor percentage of each meal consumed  - Identify factors contributing to decreased intake, treat as appropriate  - Assist with meals as needed  - Monitor I&O, WT and lab values  - Obtain nutritional consult as needed  - Optimize oral hygiene and moisture  - Encourage food from home; allow for food preferences  - Enhance eating environment  Outcome: Progressing     Problem: Patient/Family Goals  Goal: Patient/Family Long Term Goal  Description: Patient's Long Term Goal: To be discharged    Interventions:  - get clearance  - See additional Care Plan goals for specific interventions  Outcome: Progressing  Goal: Patient/Family Short Term Goal  Description: Patient's Short Term Goal: Manage pain    Interventions:   - monitor and assess pain  - See additional Care Plan goals for specific interventions  Outcome: Progressing

## 2023-12-26 NOTE — PLAN OF CARE
Alert and oriented x4. Acute diverticulitis. Stand by to bathroom. BM 12/25 this morning. Voiding. NPO sips with med. Right abdomen pain. Zosyn. IV fluids. Plan for NPO and antibiotics. General surgery consult. Problem: Patient Centered Care  Goal: Patient preferences are identified and integrated in the patient's plan of care  Description: Interventions:  - What would you like us to know as we care for you?  Home with   - Provide timely, complete, and accurate information to patient/family  - Incorporate patient and family knowledge, values, beliefs, and cultural backgrounds into the planning and delivery of care  - Encourage patient/family to participate in care and decision-making at the level they choose  - Honor patient and family perspectives and choices  Outcome: Progressing     Problem: PAIN - ADULT  Goal: Verbalizes/displays adequate comfort level or patient's stated pain goal  Description: INTERVENTIONS:  - Encourage pt to monitor pain and request assistance  - Assess pain using appropriate pain scale  - Administer analgesics based on type and severity of pain and evaluate response  - Implement non-pharmacological measures as appropriate and evaluate response  - Consider cultural and social influences on pain and pain management  - Manage/alleviate anxiety  - Utilize distraction and/or relaxation techniques  - Monitor for opioid side effects  - Notify MD/LIP if interventions unsuccessful or patient reports new pain  - Anticipate increased pain with activity and pre-medicate as appropriate  Outcome: Progressing     Problem: RISK FOR INFECTION - ADULT  Goal: Absence of fever/infection during anticipated neutropenic period  Description: INTERVENTIONS  - Monitor WBC  - Administer growth factors as ordered  - Implement neutropenic guidelines  Outcome: Progressing     Problem: DISCHARGE PLANNING  Goal: Discharge to home or other facility with appropriate resources  Description: INTERVENTIONS:  - Identify barriers to discharge w/pt and caregiver  - Include patient/family/discharge partner in discharge planning  - Arrange for needed discharge resources and transportation as appropriate  - Identify discharge learning needs (meds, wound care, etc)  - Arrange for interpreters to assist at discharge as needed  - Consider post-discharge preferences of patient/family/discharge partner  - Complete POLST form as appropriate  - Assess patient's ability to be responsible for managing their own health  - Refer to Case Management Department for coordinating discharge planning if the patient needs post-hospital services based on physician/LIP order or complex needs related to functional status, cognitive ability or social support system  Outcome: Progressing     Problem: GASTROINTESTINAL - ADULT  Goal: Minimal or absence of nausea and vomiting  Description: INTERVENTIONS:  - Maintain adequate hydration with IV or PO as ordered and tolerated  - Nasogastric tube to low intermittent suction as ordered  - Evaluate effectiveness of ordered antiemetic medications  - Provide nonpharmacologic comfort measures as appropriate  - Advance diet as tolerated, if ordered  - Obtain nutritional consult as needed  - Evaluate fluid balance  Outcome: Progressing  Goal: Maintains or returns to baseline bowel function  Description: INTERVENTIONS:  - Assess bowel function  - Maintain adequate hydration with IV or PO as ordered and tolerated  - Evaluate effectiveness of GI medications  - Encourage mobilization and activity  - Obtain nutritional consult as needed  - Establish a toileting routine/schedule  - Consider collaborating with pharmacy to review patient's medication profile  Outcome: Progressing  Goal: Maintains adequate nutritional intake (undernourished)  Description: INTERVENTIONS:  - Monitor percentage of each meal consumed  - Identify factors contributing to decreased intake, treat as appropriate  - Assist with meals as needed  - Monitor I&O, WT and lab values  - Obtain nutritional consult as needed  - Optimize oral hygiene and moisture  - Encourage food from home; allow for food preferences  - Enhance eating environment  Outcome: Progressing

## 2023-12-27 ENCOUNTER — TELEPHONE (OUTPATIENT)
Facility: CLINIC | Age: 58
End: 2023-12-27

## 2023-12-27 LAB
ANION GAP SERPL CALC-SCNC: 4 MMOL/L (ref 0–18)
BASOPHILS # BLD AUTO: 0.02 X10(3) UL (ref 0–0.2)
BASOPHILS NFR BLD AUTO: 0.4 %
BUN BLD-MCNC: 7 MG/DL (ref 9–23)
BUN/CREAT SERPL: 9.2 (ref 10–20)
CALCIUM BLD-MCNC: 9 MG/DL (ref 8.7–10.4)
CHLORIDE SERPL-SCNC: 103 MMOL/L (ref 98–112)
CO2 SERPL-SCNC: 30 MMOL/L (ref 21–32)
CREAT BLD-MCNC: 0.76 MG/DL
DEPRECATED RDW RBC AUTO: 45.1 FL (ref 35.1–46.3)
EGFRCR SERPLBLD CKD-EPI 2021: 91 ML/MIN/1.73M2 (ref 60–?)
EOSINOPHIL # BLD AUTO: 0.25 X10(3) UL (ref 0–0.7)
EOSINOPHIL NFR BLD AUTO: 4.5 %
ERYTHROCYTE [DISTWIDTH] IN BLOOD BY AUTOMATED COUNT: 13.2 % (ref 11–15)
GLUCOSE BLD-MCNC: 101 MG/DL (ref 70–99)
HCT VFR BLD AUTO: 36.6 %
HGB BLD-MCNC: 12.3 G/DL
IMM GRANULOCYTES # BLD AUTO: 0.02 X10(3) UL (ref 0–1)
IMM GRANULOCYTES NFR BLD: 0.4 %
LYMPHOCYTES # BLD AUTO: 1.67 X10(3) UL (ref 1–4)
LYMPHOCYTES NFR BLD AUTO: 30.3 %
MCH RBC QN AUTO: 31.3 PG (ref 26–34)
MCHC RBC AUTO-ENTMCNC: 33.6 G/DL (ref 31–37)
MCV RBC AUTO: 93.1 FL
MONOCYTES # BLD AUTO: 0.62 X10(3) UL (ref 0.1–1)
MONOCYTES NFR BLD AUTO: 11.2 %
NEUTROPHILS # BLD AUTO: 2.94 X10 (3) UL (ref 1.5–7.7)
NEUTROPHILS # BLD AUTO: 2.94 X10(3) UL (ref 1.5–7.7)
NEUTROPHILS NFR BLD AUTO: 53.2 %
OSMOLALITY SERPL CALC.SUM OF ELEC: 282 MOSM/KG (ref 275–295)
PLATELET # BLD AUTO: 198 10(3)UL (ref 150–450)
POTASSIUM SERPL-SCNC: 4.2 MMOL/L (ref 3.5–5.1)
RBC # BLD AUTO: 3.93 X10(6)UL
SODIUM SERPL-SCNC: 137 MMOL/L (ref 136–145)
WBC # BLD AUTO: 5.5 X10(3) UL (ref 4–11)

## 2023-12-27 PROCEDURE — 99233 SBSQ HOSP IP/OBS HIGH 50: CPT | Performed by: INTERNAL MEDICINE

## 2023-12-27 NOTE — PLAN OF CARE
Problem: Patient Centered Care  Goal: Patient preferences are identified and integrated in the patient's plan of care  Description: Interventions:  - What would you like us to know as we care for you?  From home with family  - Provide timely, complete, and accurate information to patient/family  - Incorporate patient and family knowledge, values, beliefs, and cultural backgrounds into the planning and delivery of care  - Encourage patient/family to participate in care and decision-making at the level they choose  - Honor patient and family perspectives and choices  Outcome: Progressing

## 2023-12-28 ENCOUNTER — APPOINTMENT (OUTPATIENT)
Dept: CT IMAGING | Facility: HOSPITAL | Age: 58
End: 2023-12-28
Attending: SPECIALIST
Payer: COMMERCIAL

## 2023-12-28 LAB
ANION GAP SERPL CALC-SCNC: 5 MMOL/L (ref 0–18)
BASOPHILS # BLD AUTO: 0.03 X10(3) UL (ref 0–0.2)
BASOPHILS NFR BLD AUTO: 0.5 %
BUN BLD-MCNC: <5 MG/DL (ref 9–23)
CALCIUM BLD-MCNC: 9.4 MG/DL (ref 8.7–10.4)
CHLORIDE SERPL-SCNC: 105 MMOL/L (ref 98–112)
CO2 SERPL-SCNC: 25 MMOL/L (ref 21–32)
CREAT BLD-MCNC: 0.76 MG/DL
DEPRECATED RDW RBC AUTO: 44.3 FL (ref 35.1–46.3)
EGFRCR SERPLBLD CKD-EPI 2021: 91 ML/MIN/1.73M2 (ref 60–?)
EOSINOPHIL # BLD AUTO: 0.27 X10(3) UL (ref 0–0.7)
EOSINOPHIL NFR BLD AUTO: 4.9 %
ERYTHROCYTE [DISTWIDTH] IN BLOOD BY AUTOMATED COUNT: 12.8 % (ref 11–15)
GLUCOSE BLD-MCNC: 98 MG/DL (ref 70–99)
HCT VFR BLD AUTO: 39.7 %
HGB BLD-MCNC: 13.1 G/DL
IMM GRANULOCYTES # BLD AUTO: 0.01 X10(3) UL (ref 0–1)
IMM GRANULOCYTES NFR BLD: 0.2 %
LYMPHOCYTES # BLD AUTO: 1.53 X10(3) UL (ref 1–4)
LYMPHOCYTES NFR BLD AUTO: 27.8 %
MCH RBC QN AUTO: 30.8 PG (ref 26–34)
MCHC RBC AUTO-ENTMCNC: 33 G/DL (ref 31–37)
MCV RBC AUTO: 93.2 FL
MONOCYTES # BLD AUTO: 0.69 X10(3) UL (ref 0.1–1)
MONOCYTES NFR BLD AUTO: 12.5 %
NEUTROPHILS # BLD AUTO: 2.98 X10 (3) UL (ref 1.5–7.7)
NEUTROPHILS # BLD AUTO: 2.98 X10(3) UL (ref 1.5–7.7)
NEUTROPHILS NFR BLD AUTO: 54.1 %
PLATELET # BLD AUTO: 252 10(3)UL (ref 150–450)
POTASSIUM SERPL-SCNC: 4.3 MMOL/L (ref 3.5–5.1)
RBC # BLD AUTO: 4.26 X10(6)UL
SODIUM SERPL-SCNC: 135 MMOL/L (ref 136–145)
WBC # BLD AUTO: 5.5 X10(3) UL (ref 4–11)

## 2023-12-28 PROCEDURE — 74177 CT ABD & PELVIS W/CONTRAST: CPT | Performed by: SPECIALIST

## 2023-12-28 PROCEDURE — 99233 SBSQ HOSP IP/OBS HIGH 50: CPT | Performed by: INTERNAL MEDICINE

## 2023-12-29 VITALS
HEART RATE: 57 BPM | SYSTOLIC BLOOD PRESSURE: 105 MMHG | TEMPERATURE: 98 F | RESPIRATION RATE: 16 BRPM | BODY MASS INDEX: 19.16 KG/M2 | DIASTOLIC BLOOD PRESSURE: 69 MMHG | HEIGHT: 65 IN | OXYGEN SATURATION: 95 % | WEIGHT: 115 LBS

## 2023-12-29 LAB
ANION GAP SERPL CALC-SCNC: 2 MMOL/L (ref 0–18)
BASOPHILS # BLD AUTO: 0.03 X10(3) UL (ref 0–0.2)
BASOPHILS NFR BLD AUTO: 0.6 %
BUN BLD-MCNC: 6 MG/DL (ref 9–23)
BUN/CREAT SERPL: 7.4 (ref 10–20)
CALCIUM BLD-MCNC: 9.5 MG/DL (ref 8.7–10.4)
CHLORIDE SERPL-SCNC: 104 MMOL/L (ref 98–112)
CO2 SERPL-SCNC: 28 MMOL/L (ref 21–32)
CREAT BLD-MCNC: 0.81 MG/DL
DEPRECATED RDW RBC AUTO: 43.3 FL (ref 35.1–46.3)
EGFRCR SERPLBLD CKD-EPI 2021: 85 ML/MIN/1.73M2 (ref 60–?)
EOSINOPHIL # BLD AUTO: 0.26 X10(3) UL (ref 0–0.7)
EOSINOPHIL NFR BLD AUTO: 5.3 %
ERYTHROCYTE [DISTWIDTH] IN BLOOD BY AUTOMATED COUNT: 12.6 % (ref 11–15)
GLUCOSE BLD-MCNC: 99 MG/DL (ref 70–99)
HCT VFR BLD AUTO: 40.4 %
HGB BLD-MCNC: 13.5 G/DL
IMM GRANULOCYTES # BLD AUTO: 0.01 X10(3) UL (ref 0–1)
IMM GRANULOCYTES NFR BLD: 0.2 %
LYMPHOCYTES # BLD AUTO: 1.32 X10(3) UL (ref 1–4)
LYMPHOCYTES NFR BLD AUTO: 26.9 %
MCH RBC QN AUTO: 31 PG (ref 26–34)
MCHC RBC AUTO-ENTMCNC: 33.4 G/DL (ref 31–37)
MCV RBC AUTO: 92.7 FL
MONOCYTES # BLD AUTO: 0.61 X10(3) UL (ref 0.1–1)
MONOCYTES NFR BLD AUTO: 12.4 %
NEUTROPHILS # BLD AUTO: 2.67 X10 (3) UL (ref 1.5–7.7)
NEUTROPHILS # BLD AUTO: 2.67 X10(3) UL (ref 1.5–7.7)
NEUTROPHILS NFR BLD AUTO: 54.6 %
OSMOLALITY SERPL CALC.SUM OF ELEC: 276 MOSM/KG (ref 275–295)
PLATELET # BLD AUTO: 275 10(3)UL (ref 150–450)
POTASSIUM SERPL-SCNC: 3.9 MMOL/L (ref 3.5–5.1)
RBC # BLD AUTO: 4.36 X10(6)UL
SODIUM SERPL-SCNC: 134 MMOL/L (ref 136–145)
WBC # BLD AUTO: 4.9 X10(3) UL (ref 4–11)

## 2023-12-29 RX ORDER — AMOXICILLIN AND CLAVULANATE POTASSIUM 875; 125 MG/1; MG/1
1 TABLET, FILM COATED ORAL 2 TIMES DAILY
Qty: 20 TABLET | Refills: 0 | Status: SHIPPED | OUTPATIENT
Start: 2023-12-29

## 2023-12-29 RX ORDER — HYDROCODONE BITARTRATE AND ACETAMINOPHEN 5; 325 MG/1; MG/1
1-2 TABLET ORAL EVERY 4 HOURS PRN
Qty: 10 TABLET | Refills: 0 | Status: SHIPPED | OUTPATIENT
Start: 2023-12-29

## 2023-12-29 NOTE — PLAN OF CARE
Patient advanced to low fiber soft diet and tolerating well. Voiding freely and denies pain. PIV removed. AVS provided. No further questions or concerns at time of discharge. Problem: Patient Centered Care  Goal: Patient preferences are identified and integrated in the patient's plan of care  Description: Interventions:  - What would you like us to know as we care for you?  From home with family  - Provide timely, complete, and accurate information to patient/family  - Incorporate patient and family knowledge, values, beliefs, and cultural backgrounds into the planning and delivery of care  - Encourage patient/family to participate in care and decision-making at the level they choose  - Honor patient and family perspectives and choices  Outcome: Adequate for Discharge     Problem: PAIN - ADULT  Goal: Verbalizes/displays adequate comfort level or patient's stated pain goal  Description: INTERVENTIONS:  - Encourage pt to monitor pain and request assistance  - Assess pain using appropriate pain scale  - Administer analgesics based on type and severity of pain and evaluate response  - Implement non-pharmacological measures as appropriate and evaluate response  - Consider cultural and social influences on pain and pain management  - Manage/alleviate anxiety  - Utilize distraction and/or relaxation techniques  - Monitor for opioid side effects  - Notify MD/LIP if interventions unsuccessful or patient reports new pain  - Anticipate increased pain with activity and pre-medicate as appropriate  Outcome: Adequate for Discharge     Problem: RISK FOR INFECTION - ADULT  Goal: Absence of fever/infection during anticipated neutropenic period  Description: INTERVENTIONS  - Monitor WBC  - Administer growth factors as ordered  - Implement neutropenic guidelines  Outcome: Adequate for Discharge     Problem: DISCHARGE PLANNING  Goal: Discharge to home or other facility with appropriate resources  Description: INTERVENTIONS:  - Identify barriers to discharge w/pt and caregiver  - Include patient/family/discharge partner in discharge planning  - Arrange for needed discharge resources and transportation as appropriate  - Identify discharge learning needs (meds, wound care, etc)  - Arrange for interpreters to assist at discharge as needed  - Consider post-discharge preferences of patient/family/discharge partner  - Complete POLST form as appropriate  - Assess patient's ability to be responsible for managing their own health  - Refer to Case Management Department for coordinating discharge planning if the patient needs post-hospital services based on physician/LIP order or complex needs related to functional status, cognitive ability or social support system  Outcome: Adequate for Discharge     Problem: GASTROINTESTINAL - ADULT  Goal: Minimal or absence of nausea and vomiting  Description: INTERVENTIONS:  - Maintain adequate hydration with IV or PO as ordered and tolerated  - Nasogastric tube to low intermittent suction as ordered  - Evaluate effectiveness of ordered antiemetic medications  - Provide nonpharmacologic comfort measures as appropriate  - Advance diet as tolerated, if ordered  - Obtain nutritional consult as needed  - Evaluate fluid balance  Outcome: Adequate for Discharge  Goal: Maintains or returns to baseline bowel function  Description: INTERVENTIONS:  - Assess bowel function  - Maintain adequate hydration with IV or PO as ordered and tolerated  - Evaluate effectiveness of GI medications  - Encourage mobilization and activity  - Obtain nutritional consult as needed  - Establish a toileting routine/schedule  - Consider collaborating with pharmacy to review patient's medication profile  Outcome: Adequate for Discharge  Goal: Maintains adequate nutritional intake (undernourished)  Description: INTERVENTIONS:  - Monitor percentage of each meal consumed  - Identify factors contributing to decreased intake, treat as appropriate  - Assist with meals as needed  - Monitor I&O, WT and lab values  - Obtain nutritional consult as needed  - Optimize oral hygiene and moisture  - Encourage food from home; allow for food preferences  - Enhance eating environment  Outcome: Adequate for Discharge     Problem: Patient/Family Goals  Goal: Patient/Family Long Term Goal  Description: Patient's Long Term Goal: To be discharged    Interventions:  - get clearance  - See additional Care Plan goals for specific interventions  Outcome: Adequate for Discharge  Goal: Patient/Family Short Term Goal  Description: Patient's Short Term Goal: Manage pain    Interventions:   - monitor and assess pain  - See additional Care Plan goals for specific interventions  Outcome: Adequate for Discharge

## 2023-12-29 NOTE — PLAN OF CARE
Patient had CT with oral and IV contrast. Per general surgery, improvement seen advanced to full liquid diet and tolerating. Denies any pain. IV fluid rate decreased. Voiding freely and ambulating independently. Resting in bed, bed is low and locked with call light within reach and frequent rounds in place. Problem: Patient Centered Care  Goal: Patient preferences are identified and integrated in the patient's plan of care  Description: Interventions:  - What would you like us to know as we care for you?  From home with family  - Provide timely, complete, and accurate information to patient/family  - Incorporate patient and family knowledge, values, beliefs, and cultural backgrounds into the planning and delivery of care  - Encourage patient/family to participate in care and decision-making at the level they choose  - Honor patient and family perspectives and choices  Outcome: Progressing     Problem: PAIN - ADULT  Goal: Verbalizes/displays adequate comfort level or patient's stated pain goal  Description: INTERVENTIONS:  - Encourage pt to monitor pain and request assistance  - Assess pain using appropriate pain scale  - Administer analgesics based on type and severity of pain and evaluate response  - Implement non-pharmacological measures as appropriate and evaluate response  - Consider cultural and social influences on pain and pain management  - Manage/alleviate anxiety  - Utilize distraction and/or relaxation techniques  - Monitor for opioid side effects  - Notify MD/LIP if interventions unsuccessful or patient reports new pain  - Anticipate increased pain with activity and pre-medicate as appropriate  Outcome: Progressing     Problem: RISK FOR INFECTION - ADULT  Goal: Absence of fever/infection during anticipated neutropenic period  Description: INTERVENTIONS  - Monitor WBC  - Administer growth factors as ordered  - Implement neutropenic guidelines  Outcome: Progressing     Problem: DISCHARGE PLANNING  Goal: Discharge to home or other facility with appropriate resources  Description: INTERVENTIONS:  - Identify barriers to discharge w/pt and caregiver  - Include patient/family/discharge partner in discharge planning  - Arrange for needed discharge resources and transportation as appropriate  - Identify discharge learning needs (meds, wound care, etc)  - Arrange for interpreters to assist at discharge as needed  - Consider post-discharge preferences of patient/family/discharge partner  - Complete POLST form as appropriate  - Assess patient's ability to be responsible for managing their own health  - Refer to Case Management Department for coordinating discharge planning if the patient needs post-hospital services based on physician/LIP order or complex needs related to functional status, cognitive ability or social support system  Outcome: Progressing     Problem: GASTROINTESTINAL - ADULT  Goal: Minimal or absence of nausea and vomiting  Description: INTERVENTIONS:  - Maintain adequate hydration with IV or PO as ordered and tolerated  - Nasogastric tube to low intermittent suction as ordered  - Evaluate effectiveness of ordered antiemetic medications  - Provide nonpharmacologic comfort measures as appropriate  - Advance diet as tolerated, if ordered  - Obtain nutritional consult as needed  - Evaluate fluid balance  Outcome: Progressing  Goal: Maintains or returns to baseline bowel function  Description: INTERVENTIONS:  - Assess bowel function  - Maintain adequate hydration with IV or PO as ordered and tolerated  - Evaluate effectiveness of GI medications  - Encourage mobilization and activity  - Obtain nutritional consult as needed  - Establish a toileting routine/schedule  - Consider collaborating with pharmacy to review patient's medication profile  Outcome: Progressing  Goal: Maintains adequate nutritional intake (undernourished)  Description: INTERVENTIONS:  - Monitor percentage of each meal consumed  - Identify factors contributing to decreased intake, treat as appropriate  - Assist with meals as needed  - Monitor I&O, WT and lab values  - Obtain nutritional consult as needed  - Optimize oral hygiene and moisture  - Encourage food from home; allow for food preferences  - Enhance eating environment  Outcome: Progressing     Problem: Patient/Family Goals  Goal: Patient/Family Long Term Goal  Description: Patient's Long Term Goal: To be discharged    Interventions:  - get clearance  - See additional Care Plan goals for specific interventions  Outcome: Progressing  Goal: Patient/Family Short Term Goal  Description: Patient's Short Term Goal: Manage pain    Interventions:   - monitor and assess pain  - See additional Care Plan goals for specific interventions  Outcome: Progressing

## 2023-12-29 NOTE — DISCHARGE INSTRUCTIONS
Continue soft diet while at home for 2 weeks. Follow up with Dr. Remi Swanson as outpatient if you wish to proceed with elective resection. Recommend follow up with GI for outpatient colonoscopy.

## 2024-01-02 ENCOUNTER — TELEPHONE (OUTPATIENT)
Dept: INTEGRATIVE MEDICINE | Facility: CLINIC | Age: 59
End: 2024-01-02

## 2024-01-02 ENCOUNTER — PATIENT OUTREACH (OUTPATIENT)
Dept: CASE MANAGEMENT | Age: 59
End: 2024-01-02

## 2024-01-02 DIAGNOSIS — K57.20 DIVERTICULITIS OF LARGE INTESTINE WITH PERFORATION WITHOUT ABSCESS OR BLEEDING: Primary | ICD-10-CM

## 2024-01-02 DIAGNOSIS — Z02.9 ENCOUNTERS FOR UNSPECIFIED ADMINISTRATIVE PURPOSE: ICD-10-CM

## 2024-01-02 NOTE — PROGRESS NOTES
Initial Post Discharge Follow Up   Discharge Date: 12/29/23  Contact Date: 1/2/2024    Consent Verification:  Assessment Completed With: Patient  HIPAA Verified?  Yes    Discharge Dx:   Perforated ascending colon diverticulitis    Perforated diverticulum of large intestine     General:   How have you been since your discharge from the hospital? Pt feeling better, since hospital discharge--tolerating Augmentin, as prescribed, following soft, low fiber diet, staying hydrated. Pt did not  Hydrocodone, as she feels she does not need it. Patient denies fever, chills, nausea, vomiting, diarrhea, intractable abdominal pain, chest pain or shortness of breath at this time.  Do you have any pain since discharge?  No    How well was your pain managed while in the hospital?   On a scale of 1-5   1- Very Poor and 5- Very well   Very Well  When you were leaving the hospital were your discharge instructions reviewed with you? Yes  How well were your discharge instructions explained to you?   On a scale of 1-5   1- Very Poor and 5- Very well   Very Well  Do you have any questions about your discharge instructions?  No  Before leaving the hospital was your diagnoses explained to you? Yes  Do you have any questions about your diagnoses? No  Are you able to perform normal daily activities of living as you have prior to your hospital stay (dressing, bathing, ambulating to the bathroom, etc)? yes  (NCM) Was patient given a different diet per AVS? yes  If so, which diet?  Low fiber, Continue soft diet while at home for 2 weeks.    Medications:   Current Outpatient Medications   Medication Sig Dispense Refill    amoxicillin clavulanate 875-125 MG Oral Tab Take 1 tablet by mouth 2 (two) times daily. 20 tablet 0    HYDROcodone-acetaminophen 5-325 MG Oral Tab Take 1-2 tablets by mouth every 4 (four) hours as needed for Pain. 10 tablet 0    Turmeric (QC TUMERIC COMPLEX OR) Take 1 tablet by mouth daily.      Multiple Vitamins-Minerals  (MULTI-VITAMIN/MINERALS) Oral Tab Take 1 tablet by mouth daily.      estradiol 0.1 MG/GM Vaginal Cream Place 0.5 g vaginally daily for 14 days, THEN 0.5 g twice a week. 1 each 2    thyroid (ARMOUR THYROID) 15 MG Oral Tab Take 3 tablets (45 mg total) by mouth daily. Take 1 tablet 30-60min before food and other meds/supplements 270 tablet 1    thyroid (ARMOUR THYROID) 15 MG Oral Tab 1 tablet 30-60min before food and other meds/supplements. 90 tablet 2    Cetirizine-Pseudoephedrine ER (ZYRTEC-D ALLERGY & CONGESTION) 5-120 MG Oral Tablet 12 Hr Take 1 tablet by mouth 2 (two) times daily. 24 tablet 3    Probiotic Product (PROBIOTIC DAILY OR) Take by mouth daily.       Were there any changes to your current medication(s) noted on the AVS? Yes  START taking:  amoxicillin clavulanate (Augmentin)  HYDROcodone-acetaminophen (Norco)  If so, were these medication changes discussed with you prior to leaving the hospital? Yes  If a new medication was prescribed:    Was the new medication's purpose & side effects reviewed? Yes  Do you have any questions about your new medication? No  Did you  your discharge medications when you left the hospital? Yes--did  Augmentin, but not Hydrocodone--does not feel she needs pain medication  Let's go over your medications together to make sure we are not missing anything. Medications Reviewed  Are there any reasons that keep you from taking your medication as prescribed? No  Are you having any concerns with constipation? No  Did patient receive their flu shot (Sept-March)? No    Discharge medications reviewed/discussed/and reconciled against outpatient medications with patient.  Any changes or updates to medications sent to PCP.  Patient Acknowledged     Referrals/orders at D/C:  Referrals/orders placed at D/C? no  DME ordered at D/C? No    Discharge orders, AVS reviewed and discussed with patient. Any changes or updates to orders sent to PCP.  Patient Acknowledged      SDOH:    Transportation:   Transportation Needs: No Transportation Needs (1/2/2024)    Transportation Needs     Lack of Transportation: No     Financial Strain:    Financial Resource Strain: Low Risk  (1/2/2024)    Financial Resource Strain     Difficulty of Paying Living Expenses: Not very hard     Med Affordability: No     Follow up appointments:    Follow up with Dr. Bennett as outpatient if you wish to proceed with  elective resection.  Recommend follow up with GI for outpatient colonoscopy.  Follow-up Information    Follow up With Specialties Details Why Contact Info   Colten Bennett MD SURGERY, GENERAL Schedule an appointment as soon as possible for a visit  [address]  [address]  [facility] [address]  [phone]     Your appointments       Date & Time Appointment Department ([place])    Feb 28, 2024  3:30 PM CST Exam - Established with Meggan Yañez PAJerrellC [facility] ([facility])              [facility] ([facility]  [address]  [facility] [address]  [phone]            TCC  Was TCC ordered: No    PCP (If no TCC appointment)  Does patient already have a PCP appointment scheduled? No  NCM Attempted to schedule PCP office TCM appointment with patient   If no appointment scheduled: Explain: unable to book d/t schedule restrictions    Specialist    Does the patient have any other follow up appointment(s) needing to be scheduled? Yes  If yes: NCM reviewed upcoming specialist appointment with patient: Yes  Does the patient need assistance scheduling appointment(s): No    Is there any reason as to why you cannot make your appointment(s)?  No     Needs post D/C:   Now that you are home, are there any needs or concerns you need addressed before your next visit with your PCP?  (DME, meds, questions, etc.): No    Interventions by NCM:   Discussed  diet, activity, medications and need for f/u visits. Pt prefers to f/u with GI first, before scheduling with general surgery. Patient agreeable to TCM appt with PADMINI Yañez--unable to book within recommended 2 week TCM timeframe d/t schedule restrictions--sent TE to  office staff for appt clarification and f/u.  Patient aware when to contact PCP/specialists and when to seek emergency care. No further questions/concerns at this time. Transferred pt to GI ext for f/u appt.    Overall Rating:   How would you rate the care you received while in the hospital? excellent    CCM referral placed:    Not Applicable    BOOK BY DATE: 1/12/2024

## 2024-01-02 NOTE — TELEPHONE ENCOUNTER
Spoke with patient for TCM today. Patient agreeable to TCM appt with PADMINI Yañez--unable to book within recommended 2 week TCM timeframe d/t schedule restrictions  TCM appointment recommended by 1/12/2024, as patient is a Moderate risk for readmission.  Please advise.      Clinical staff:  Please discuss with provider and follow-up with patient, accordingly. Thank you!     Future Appointments   Date Time Provider Department Center   2/28/2024  3:30 PM Meggan Yañez, N[facility] [place]

## 2024-01-02 NOTE — PAYOR COMM NOTE
--------------  DISCHARGE REVIEW    Payor: [facility] PPO  Subscriber #:  [id number]  Authorization Number: [id number]    Admit date: 12/25/23  Admit time:   4:32 PM  Discharge Date: 12/29/2023  1:58 PM     Admitting Physician: Mamie Lama MD  Attending Physician:  No att. providers found  Primary Care Physician: Emanuel Whatley MD

## 2024-01-03 ENCOUNTER — OFFICE VISIT (OUTPATIENT)
Dept: INTEGRATIVE MEDICINE | Facility: CLINIC | Age: 59
End: 2024-01-03
Payer: COMMERCIAL

## 2024-01-03 VITALS
HEART RATE: 86 BPM | WEIGHT: 108.19 LBS | SYSTOLIC BLOOD PRESSURE: 104 MMHG | TEMPERATURE: 98 F | OXYGEN SATURATION: 97 % | HEIGHT: 65 IN | DIASTOLIC BLOOD PRESSURE: 64 MMHG | BODY MASS INDEX: 18.02 KG/M2

## 2024-01-03 DIAGNOSIS — K57.20 DIVERTICULITIS OF LARGE INTESTINE WITH PERFORATION WITHOUT BLEEDING: Primary | ICD-10-CM

## 2024-01-03 PROCEDURE — 99215 OFFICE O/P EST HI 40 MIN: CPT | Performed by: PHYSICIAN ASSISTANT

## 2024-01-03 PROCEDURE — 3074F SYST BP LT 130 MM HG: CPT | Performed by: PHYSICIAN ASSISTANT

## 2024-01-03 PROCEDURE — 3008F BODY MASS INDEX DOCD: CPT | Performed by: PHYSICIAN ASSISTANT

## 2024-01-03 PROCEDURE — 3078F DIAST BP <80 MM HG: CPT | Performed by: PHYSICIAN ASSISTANT

## 2024-01-03 NOTE — TELEPHONE ENCOUNTER
I spoke to the pt and we scheduled a hospital f/u appt    Date time and location verified    Your Appointments      Monday January 29, 2024  9:30 AM  Follow Up Visit with Augie Dennison MD  [facility] ([facility]) [address] [place] [id number]  [phone]

## 2024-01-03 NOTE — PROGRESS NOTES
Tricia Danielson is a 58 year old female.  Chief Complaint   Patient presents with    Follow - Up     Hospital f/u, upper right large  intestine with perforation treated with antibiotic         HPI:   59yo female with known h/o hypothyroidism presents for hospital f/u.    Had intestinal perforation secondary to diverticulitis on 12/25.   Had abdominal bloating and abdominal pain, nausea after eating meatloaf, mashed potatoes and wine leading up to going to hospital. Admitted for 4days and received IV antibiotics. Symptoms started to improve. Saw general surgeon that did not believe she needed surgery.    Today c/o fatigue, slight lower abdominal discomfort. On soft diet of broth, bananas and toast. Some eggs this morning.   Currently still on antibiotics through next week.     To see [facility] yet - waiting for a call back from their office.     Did start to feel feverish and chills yesterday with cough, but her family has the flu. No increase in abdominal pain. No fever today.    Started to walk yesterday and stretching.    H/o Dysbiosis -no longer needs the OrthoDigestzyme before meals.  Bloating/distension approx 30min after eating mostly resolved     REVIEW OF SYSTEMS:   Review of Systems   Constitutional:  Negative for chills and fever.   HENT: Negative.     Eyes: Negative.  Negative for visual disturbance.   Respiratory:  Positive for cough. Negative for shortness of breath and wheezing.    Cardiovascular: Negative.  Negative for chest pain and palpitations.   Gastrointestinal:  Negative for abdominal distention, abdominal pain, blood in stool, constipation, diarrhea, nausea and vomiting.   Genitourinary: Negative.    Musculoskeletal: Negative.    Skin: Negative.    Allergic/Immunologic: Negative.    Neurological: Negative.  Negative for weakness and headaches.   Hematological: Negative.    Psychiatric/Behavioral: Negative.  Negative for sleep disturbance.             FAMILY HISTORY:      Family History   Problem  Relation Age of Onset    Cancer Father         Stomach cancer    Hypertension Mother     Cancer Mother      Father passed in 40s from gastric cancer  MEDICAL HISTORY:     Past Medical History:   Diagnosis Date    Allergic rhinitis     Allergy     Back problem     C2-3 mild central, C4-5 left mild foraminal, C5-6 right mod foraminal & left mild-mod, C6-7 mild-mod diffuse bulging discs 01/31/2019    C3-4 small central, C4-5 small central herniated discs 01/31/2019    C5-6 right mod foraminal stenosis 01/31/2019    Family history of basal cell carcinoma 07/07/2014    History of atypical nevus 07/07/2014    Hyperglycemia 06/05/2017    L3-4 grade 1 spondylolisthesis 06/05/2017    L3-4 mild-mod diffuse bulging disc 06/05/2017    L4-5 mild-mod central bulging disc with annular tear 06/05/2017    L4-5 mild-mod central stenosis 10/31/2017    L5-S1 left mild-mod, L3-4 left mild foraminal stenosis 06/05/2017    L5-S1 left paracentral mild-mod herniated disc 06/05/2017    left C7 radiculitis 08/27/2018    left L5-S1 radiculitis 06/05/2017    Seizure disorder (HCC)     Seizures (HCC)     Stroke (HCC)     Thyroid disorder        CURRENT MEDICATIONS:     Current Outpatient Medications   Medication Sig Dispense Refill    amoxicillin clavulanate 875-125 MG Oral Tab Take 1 tablet by mouth 2 (two) times daily. 20 tablet 0    HYDROcodone-acetaminophen 5-325 MG Oral Tab Take 1-2 tablets by mouth every 4 (four) hours as needed for Pain. 10 tablet 0    thyroid (ARMOUR THYROID) 15 MG Oral Tab Take 3 tablets (45 mg total) by mouth daily. Take 1 tablet 30-60min before food and other meds/supplements 270 tablet 1    thyroid (ARMOUR THYROID) 15 MG Oral Tab 1 tablet 30-60min before food and other meds/supplements. 90 tablet 2    Turmeric (QC TUMERIC COMPLEX OR) Take 1 tablet by mouth daily. (Patient not taking: Reported on 1/3/2024)      Multiple Vitamins-Minerals (MULTI-VITAMIN/MINERALS) Oral Tab Take 1 tablet by mouth daily. (Patient not  taking: Reported on 1/3/2024)      estradiol 0.1 MG/GM Vaginal Cream Place 0.5 g vaginally daily for 14 days, THEN 0.5 g twice a week. (Patient not taking: Reported on 1/3/2024) 1 each 2    Cetirizine-Pseudoephedrine ER (ZYRTEC-D ALLERGY & CONGESTION) 5-120 MG Oral Tablet 12 Hr Take 1 tablet by mouth 2 (two) times daily. (Patient not taking: Reported on 1/3/2024) 24 tablet 3    Probiotic Product (PROBIOTIC DAILY OR) Take by mouth daily. (Patient not taking: Reported on 1/3/2024)         SOCIAL HISTORY:   Lifestyle Factors affecting health:  Diet - Suspects not enough protein. Elimination diet/Whole 30 again for 30days. Already avoids sugar and certain food combinations, minimal dairy.      Exercise - exercise bike, stretching 5x/wk. Not tired afterwards, will feel more energized.  May start Pilates with daughter-in law after recovery.    Stress - work is stressful    Sleep - ok, trying to get into bed earlier, averaging 7hours. Will wake at 3am and then goes back to sleep.    Teacher  Social History     Socioeconomic History    Marital status:    Tobacco Use    Smoking status: Never    Smokeless tobacco: Never   Vaping Use    Vaping Use: Never used   Substance and Sexual Activity    Alcohol use: Yes     Alcohol/week: 0.0 standard drinks of alcohol     Comment: Beer and Wine, Occasionally;     Drug use: No   Other Topics Concern    Caffeine Concern Yes     Comment: Soda, Coffee, 2 cups;     Exercise Yes     Comment: stretching and hep    History of tanning No    Pt has a pacemaker No    Pt has a defibrillator No    Reaction to local anesthetic No    Left Handed No    Right Handed Yes    Currently spends a great deal of time in the sun No    Hx of Spending Great Deal of Time in Sun Yes    Bad sunburns in the past Yes     Comment: once    Tanning Salons in the Past No    Hx of Radiation Treatments No   Social History Narrative    The patient does not use an assistive device..      The patient does live in a home  with stairs.     Social Determinants of Health     Financial Resource Strain: Low Risk  (1/2/2024)    Financial Resource Strain     Difficulty of Paying Living Expenses: Not very hard     Med Affordability: No   Food Insecurity: No Food Insecurity (12/25/2023)    Food Insecurity     Food Insecurity: Never true   Transportation Needs: No Transportation Needs (1/2/2024)    Transportation Needs     Lack of Transportation: No   Housing Stability: Low Risk  (12/25/2023)    Housing Stability     Housing Instability: No       SURGICAL HISTORY:     Past Surgical History:   Procedure Laterality Date    Arthroscopy of joint unlisted      Knee surgery      Shanda biopsy stereo nodule 1 site right (cpt=19081)  01/05/2017    benign    Other surgical history  2005    Brain and neck    Other surgical history Left 1983    Knee    Other surgical history  1985    Devated septum    Spinal surgery - dmg         PHYSICAL EXAM:     Vitals:    01/03/24 1008   BP: 104/64   BP Location: Right arm   Patient Position: Sitting   Cuff Size: adult   Pulse: 86   Temp: 98 °F (36.7 °C)   SpO2: 97%   Weight: 108 lb 3.2 oz (49.1 kg)   Height: 5' 5\" (1.651 m)       Physical Exam  Vitals reviewed.   Constitutional:       General: She is not in acute distress.     Appearance: Normal appearance.   HENT:      Mouth/Throat:      Mouth: Mucous membranes are moist.      Pharynx: Oropharynx is clear.   Eyes:      Extraocular Movements: Extraocular movements intact.      Conjunctiva/sclera: Conjunctivae normal.   Neck:      Thyroid: No thyromegaly.   Cardiovascular:      Rate and Rhythm: Normal rate and regular rhythm.      Pulses: Normal pulses.      Heart sounds: Normal heart sounds. No murmur heard.     No gallop.   Pulmonary:      Effort: Pulmonary effort is normal.      Breath sounds: Normal breath sounds. No wheezing or rales.   Abdominal:      General: Abdomen is flat. Bowel sounds are normal. There is no distension.      Palpations: Abdomen is soft. There  is no mass.      Tenderness: There is no abdominal tenderness. There is no guarding.   Musculoskeletal:         General: No swelling or deformity.   Skin:     General: Skin is warm and dry.   Neurological:      General: No focal deficit present.      Mental Status: She is alert and oriented to person, place, and time.      Deep Tendon Reflexes: Reflexes normal.   Psychiatric:         Mood and Affect: Mood normal.         Behavior: Behavior normal.         Thought Content: Thought content normal.         Judgment: Judgment normal.          ASSESSMENT AND PLAN:     Admission on 12/25/2023, Discharged on 12/29/2023   Component Date Value Ref Range Status    Glucose 12/25/2023 103 (H)  70 - 99 mg/dL Final    Sodium 12/25/2023 136  136 - 145 mmol/L Final    Potassium 12/25/2023 3.9  3.5 - 5.1 mmol/L Final    Chloride 12/25/2023 100  98 - 112 mmol/L Final    CO2 12/25/2023 28.0  21.0 - 32.0 mmol/L Final    Anion Gap 12/25/2023 8  0 - 18 mmol/L Final    BUN 12/25/2023 8 (L)  9 - 23 mg/dL Final    Creatinine 12/25/2023 0.69  0.55 - 1.02 mg/dL Final    BUN/CREA Ratio 12/25/2023 11.6  10.0 - 20.0 Final    Calcium, Total 12/25/2023 9.3  8.7 - 10.4 mg/dL Final    Calculated Osmolality 12/25/2023 281  275 - 295 mOsm/kg Final    eGFR-Cr 12/25/2023 101  >=60 mL/min/1.73m2 Final    ALT 12/25/2023 30  10 - 49 U/L Final    AST 12/25/2023 45 (H)  <=34 U/L Final    Alkaline Phosphatase 12/25/2023 77  46 - 118 U/L Final    Bilirubin, Total 12/25/2023 1.1  0.3 - 1.2 mg/dL Final    Total Protein 12/25/2023 7.3  5.7 - 8.2 g/dL Final    Albumin 12/25/2023 4.7  3.2 - 4.8 g/dL Final    Globulin  12/25/2023 2.6 (L)  2.8 - 4.4 g/dL Final    A/G Ratio 12/25/2023 1.8  1.0 - 2.0 Final    Lipase 12/25/2023 28  13 - 75 U/L Final    WBC 12/25/2023 12.5 (H)  4.0 - 11.0 x10(3) uL Final    RBC 12/25/2023 4.15  3.80 - 5.30 x10(6)uL Final    HGB 12/25/2023 12.7  12.0 - 16.0 g/dL Final    HCT 12/25/2023 39.2  35.0 - 48.0 % Final    MCV 12/25/2023 94.5   80.0 - 100.0 fL Final    MCH 12/25/2023 30.6  26.0 - 34.0 pg Final    MCHC 12/25/2023 32.4  31.0 - 37.0 g/dL Final    RDW-SD 12/25/2023 48.4 (H)  35.1 - 46.3 fL Final    RDW 12/25/2023 13.8  11.0 - 15.0 % Final    PLT 12/25/2023 231.0  150.0 - 450.0 10(3)uL Final    Neutrophil Absolute Prelim 12/25/2023 8.89 (H)  1.50 - 7.70 x10 (3) uL Final    Neutrophil Absolute 12/25/2023 8.89 (H)  1.50 - 7.70 x10(3) uL Final    Lymphocyte Absolute 12/25/2023 1.93  1.00 - 4.00 x10(3) uL Final    Monocyte Absolute 12/25/2023 1.56 (H)  0.10 - 1.00 x10(3) uL Final    Eosinophil Absolute 12/25/2023 0.05  0.00 - 0.70 x10(3) uL Final    Basophil Absolute 12/25/2023 0.03  0.00 - 0.20 x10(3) uL Final    Immature Granulocyte Absolute 12/25/2023 0.04  0.00 - 1.00 x10(3) uL Final    Neutrophil % 12/25/2023 71.2  % Final    Lymphocyte % 12/25/2023 15.4  % Final    Monocyte % 12/25/2023 12.5  % Final    Eosinophil % 12/25/2023 0.4  % Final    Basophil % 12/25/2023 0.2  % Final    Immature Granulocyte % 12/25/2023 0.3  % Final    Glucose 12/26/2023 128 (H)  70 - 99 mg/dL Final    Sodium 12/26/2023 137  136 - 145 mmol/L Final    Potassium 12/26/2023 4.3  3.5 - 5.1 mmol/L Final    Chloride 12/26/2023 104  98 - 112 mmol/L Final    CO2 12/26/2023 30.0  21.0 - 32.0 mmol/L Final    Anion Gap 12/26/2023 3  0 - 18 mmol/L Final    BUN 12/26/2023 8 (L)  9 - 23 mg/dL Final    Creatinine 12/26/2023 0.80  0.55 - 1.02 mg/dL Final    BUN/CREA Ratio 12/26/2023 10.0  10.0 - 20.0 Final    Calcium, Total 12/26/2023 8.6 (L)  8.7 - 10.4 mg/dL Final    Calculated Osmolality 12/26/2023 284  275 - 295 mOsm/kg Final    eGFR-Cr 12/26/2023 86  >=60 mL/min/1.73m2 Final    WBC 12/26/2023 7.0  4.0 - 11.0 x10(3) uL Final    RBC 12/26/2023 3.78 (L)  3.80 - 5.30 x10(6)uL Final    HGB 12/26/2023 11.8 (L)  12.0 - 16.0 g/dL Final    HCT 12/26/2023 35.5  35.0 - 48.0 % Final    MCV 12/26/2023 93.9  80.0 - 100.0 fL Final    MCH 12/26/2023 31.2  26.0 - 34.0 pg Final    MCHC  12/26/2023 33.2  31.0 - 37.0 g/dL Final    RDW-SD 12/26/2023 46.7 (H)  35.1 - 46.3 fL Final    RDW 12/26/2023 13.7  11.0 - 15.0 % Final    PLT 12/26/2023 177.0  150.0 - 450.0 10(3)uL Final    Neutrophil Absolute Prelim 12/26/2023 4.40  1.50 - 7.70 x10 (3) uL Final    Neutrophil Absolute 12/26/2023 4.40  1.50 - 7.70 x10(3) uL Final    Lymphocyte Absolute 12/26/2023 1.51  1.00 - 4.00 x10(3) uL Final    Monocyte Absolute 12/26/2023 0.88  0.10 - 1.00 x10(3) uL Final    Eosinophil Absolute 12/26/2023 0.14  0.00 - 0.70 x10(3) uL Final    Basophil Absolute 12/26/2023 0.01  0.00 - 0.20 x10(3) uL Final    Immature Granulocyte Absolute 12/26/2023 0.02  0.00 - 1.00 x10(3) uL Final    Neutrophil % 12/26/2023 63.3  % Final    Lymphocyte % 12/26/2023 21.7  % Final    Monocyte % 12/26/2023 12.6  % Final    Eosinophil % 12/26/2023 2.0  % Final    Basophil % 12/26/2023 0.1  % Final    Immature Granulocyte % 12/26/2023 0.3  % Final    Glucose 12/27/2023 101 (H)  70 - 99 mg/dL Final    Sodium 12/27/2023 137  136 - 145 mmol/L Final    Potassium 12/27/2023 4.2  3.5 - 5.1 mmol/L Final    Chloride 12/27/2023 103  98 - 112 mmol/L Final    CO2 12/27/2023 30.0  21.0 - 32.0 mmol/L Final    Anion Gap 12/27/2023 4  0 - 18 mmol/L Final    BUN 12/27/2023 7 (L)  9 - 23 mg/dL Final    Creatinine 12/27/2023 0.76  0.55 - 1.02 mg/dL Final    BUN/CREA Ratio 12/27/2023 9.2 (L)  10.0 - 20.0 Final    Calcium, Total 12/27/2023 9.0  8.7 - 10.4 mg/dL Final    Calculated Osmolality 12/27/2023 282  275 - 295 mOsm/kg Final    eGFR-Cr 12/27/2023 91  >=60 mL/min/1.73m2 Final    WBC 12/27/2023 5.5  4.0 - 11.0 x10(3) uL Final    RBC 12/27/2023 3.93  3.80 - 5.30 x10(6)uL Final    HGB 12/27/2023 12.3  12.0 - 16.0 g/dL Final    HCT 12/27/2023 36.6  35.0 - 48.0 % Final    MCV 12/27/2023 93.1  80.0 - 100.0 fL Final    MCH 12/27/2023 31.3  26.0 - 34.0 pg Final    MCHC 12/27/2023 33.6  31.0 - 37.0 g/dL Final    RDW-SD 12/27/2023 45.1  35.1 - 46.3 fL Final    RDW  12/27/2023 13.2  11.0 - 15.0 % Final    PLT 12/27/2023 198.0  150.0 - 450.0 10(3)uL Final    Neutrophil Absolute Prelim 12/27/2023 2.94  1.50 - 7.70 x10 (3) uL Final    Neutrophil Absolute 12/27/2023 2.94  1.50 - 7.70 x10(3) uL Final    Lymphocyte Absolute 12/27/2023 1.67  1.00 - 4.00 x10(3) uL Final    Monocyte Absolute 12/27/2023 0.62  0.10 - 1.00 x10(3) uL Final    Eosinophil Absolute 12/27/2023 0.25  0.00 - 0.70 x10(3) uL Final    Basophil Absolute 12/27/2023 0.02  0.00 - 0.20 x10(3) uL Final    Immature Granulocyte Absolute 12/27/2023 0.02  0.00 - 1.00 x10(3) uL Final    Neutrophil % 12/27/2023 53.2  % Final    Lymphocyte % 12/27/2023 30.3  % Final    Monocyte % 12/27/2023 11.2  % Final    Eosinophil % 12/27/2023 4.5  % Final    Basophil % 12/27/2023 0.4  % Final    Immature Granulocyte % 12/27/2023 0.4  % Final    Glucose 12/28/2023 98  70 - 99 mg/dL Final    Sodium 12/28/2023 135 (L)  136 - 145 mmol/L Final    Potassium 12/28/2023 4.3  3.5 - 5.1 mmol/L Final    Chloride 12/28/2023 105  98 - 112 mmol/L Final    CO2 12/28/2023 25.0  21.0 - 32.0 mmol/L Final    Anion Gap 12/28/2023 5  0 - 18 mmol/L Final    BUN 12/28/2023 <5 (L)  9 - 23 mg/dL Final    Creatinine 12/28/2023 0.76  0.55 - 1.02 mg/dL Final    Calcium, Total 12/28/2023 9.4  8.7 - 10.4 mg/dL Final    eGFR-Cr 12/28/2023 91  >=60 mL/min/1.73m2 Final    WBC 12/28/2023 5.5  4.0 - 11.0 x10(3) uL Final    RBC 12/28/2023 4.26  3.80 - 5.30 x10(6)uL Final    HGB 12/28/2023 13.1  12.0 - 16.0 g/dL Final    HCT 12/28/2023 39.7  35.0 - 48.0 % Final    MCV 12/28/2023 93.2  80.0 - 100.0 fL Final    MCH 12/28/2023 30.8  26.0 - 34.0 pg Final    MCHC 12/28/2023 33.0  31.0 - 37.0 g/dL Final    RDW-SD 12/28/2023 44.3  35.1 - 46.3 fL Final    RDW 12/28/2023 12.8  11.0 - 15.0 % Final    PLT 12/28/2023 252.0  150.0 - 450.0 10(3)uL Final    Neutrophil Absolute Prelim 12/28/2023 2.98  1.50 - 7.70 x10 (3) uL Final    Neutrophil Absolute 12/28/2023 2.98  1.50 - 7.70 x10(3) uL  Final    Lymphocyte Absolute 12/28/2023 1.53  1.00 - 4.00 x10(3) uL Final    Monocyte Absolute 12/28/2023 0.69  0.10 - 1.00 x10(3) uL Final    Eosinophil Absolute 12/28/2023 0.27  0.00 - 0.70 x10(3) uL Final    Basophil Absolute 12/28/2023 0.03  0.00 - 0.20 x10(3) uL Final    Immature Granulocyte Absolute 12/28/2023 0.01  0.00 - 1.00 x10(3) uL Final    Neutrophil % 12/28/2023 54.1  % Final    Lymphocyte % 12/28/2023 27.8  % Final    Monocyte % 12/28/2023 12.5  % Final    Eosinophil % 12/28/2023 4.9  % Final    Basophil % 12/28/2023 0.5  % Final    Immature Granulocyte % 12/28/2023 0.2  % Final    Glucose 12/29/2023 99  70 - 99 mg/dL Final    Sodium 12/29/2023 134 (L)  136 - 145 mmol/L Final    Potassium 12/29/2023 3.9  3.5 - 5.1 mmol/L Final    Chloride 12/29/2023 104  98 - 112 mmol/L Final    CO2 12/29/2023 28.0  21.0 - 32.0 mmol/L Final    Anion Gap 12/29/2023 2  0 - 18 mmol/L Final    BUN 12/29/2023 6 (L)  9 - 23 mg/dL Final    Creatinine 12/29/2023 0.81  0.55 - 1.02 mg/dL Final    BUN/CREA Ratio 12/29/2023 7.4 (L)  10.0 - 20.0 Final    Calcium, Total 12/29/2023 9.5  8.7 - 10.4 mg/dL Final    Calculated Osmolality 12/29/2023 276  275 - 295 mOsm/kg Final    eGFR-Cr 12/29/2023 85  >=60 mL/min/1.73m2 Final    WBC 12/29/2023 4.9  4.0 - 11.0 x10(3) uL Final    RBC 12/29/2023 4.36  3.80 - 5.30 x10(6)uL Final    HGB 12/29/2023 13.5  12.0 - 16.0 g/dL Final    HCT 12/29/2023 40.4  35.0 - 48.0 % Final    MCV 12/29/2023 92.7  80.0 - 100.0 fL Final    MCH 12/29/2023 31.0  26.0 - 34.0 pg Final    MCHC 12/29/2023 33.4  31.0 - 37.0 g/dL Final    RDW-SD 12/29/2023 43.3  35.1 - 46.3 fL Final    RDW 12/29/2023 12.6  11.0 - 15.0 % Final    PLT 12/29/2023 275.0  150.0 - 450.0 10(3)uL Final    Neutrophil Absolute Prelim 12/29/2023 2.67  1.50 - 7.70 x10 (3) uL Final    Neutrophil Absolute 12/29/2023 2.67  1.50 - 7.70 x10(3) uL Final    Lymphocyte Absolute 12/29/2023 1.32  1.00 - 4.00 x10(3) uL Final    Monocyte Absolute 12/29/2023  0.61  0.10 - 1.00 x10(3) uL Final    Eosinophil Absolute 12/29/2023 0.26  0.00 - 0.70 x10(3) uL Final    Basophil Absolute 12/29/2023 0.03  0.00 - 0.20 x10(3) uL Final    Immature Granulocyte Absolute 12/29/2023 0.01  0.00 - 1.00 x10(3) uL Final    Neutrophil % 12/29/2023 54.6  % Final    Lymphocyte % 12/29/2023 26.9  % Final    Monocyte % 12/29/2023 12.4  % Final    Eosinophil % 12/29/2023 5.3  % Final    Basophil % 12/29/2023 0.6  % Final    Immature Granulocyte % 12/29/2023 0.2  % Final   [facility] Lab Encounter on 12/15/2023   Component Date Value Ref Range Status    Free T4 12/15/2023 1.1  0.8 - 1.7 ng/dL Final    TSH 12/15/2023 3.346  0.550 - 4.780 mIU/mL Final    T3 Free 12/15/2023 2.82  2.40 - 4.20 pg/mL Final    Reverse T3 12/15/2023 12.4  9.2 - 24.1 ng/dL Final    Iron 12/15/2023 125  50 - 170 ug/dL Final    Transferrin 12/15/2023 271  250 - 380 mg/dL Final    Total Iron Binding Capacity 12/15/2023 404  250 - 425 ug/dL Final    % Saturation 12/15/2023 31  15 - 50 % Final    Glucose 12/15/2023 113 (H)  70 - 99 mg/dL Final    Sodium 12/15/2023 139  136 - 145 mmol/L Final    Potassium 12/15/2023 4.2  3.5 - 5.1 mmol/L Final    Chloride 12/15/2023 104  98 - 112 mmol/L Final    CO2 12/15/2023 29.0  21.0 - 32.0 mmol/L Final    Anion Gap 12/15/2023 6  0 - 18 mmol/L Final    BUN 12/15/2023 9  9 - 23 mg/dL Final    Creatinine 12/15/2023 0.77  0.55 - 1.02 mg/dL Final    BUN/CREA Ratio 12/15/2023 11.7  10.0 - 20.0 Final    Calcium, Total 12/15/2023 9.4  8.7 - 10.4 mg/dL Final    Calculated Osmolality 12/15/2023 287  275 - 295 mOsm/kg Final    eGFR-Cr 12/15/2023 90  >=60 mL/min/1.73m2 Final    ALT 12/15/2023 20  10 - 49 U/L Final    AST 12/15/2023 27  <=34 U/L Final    Alkaline Phosphatase 12/15/2023 71  46 - 118 U/L Final    Bilirubin, Total 12/15/2023 0.8  0.3 - 1.2 mg/dL Final    Total Protein 12/15/2023 6.9  5.7 - 8.2 g/dL Final    Albumin 12/15/2023 4.5  3.2 - 4.8 g/dL Final    Globulin  12/15/2023 2.4 (L)  2.8 - 4.4  g/dL Final    A/G Ratio 12/15/2023 1.9  1.0 - 2.0 Final    Patient Fasting for CMP? 12/15/2023 Yes   Final    Ferritin 12/15/2023 61.0  18.0 - 340.0 ng/mL Final    HgbA1C 12/15/2023 5.6  <5.7 % Final     Normal HbA1C:     <5.7%      Pre-Diabetic:     5.7 - 6.4%      Diabetic:         >6.4%      Diabetic Control: <7.0%        Estimated Average Glucose 12/15/2023 114  68 - 126 mg/dL Final    eAG is the estimated average glucose calculated from Hgb A1c according to the formula recommended by the American Diabetes Association. eAG levels reflect the long term average glucose and may not correlate with random or fasting glucose levels since these represent specific points in time.           Insulin 12/15/2023 4.5  3.0 - 25.0 mU/L Final    WBC 12/15/2023 5.1  4.0 - 11.0 x10(3) uL Final    RBC 12/15/2023 4.48  3.80 - 5.30 x10(6)uL Final    HGB 12/15/2023 13.9  12.0 - 16.0 g/dL Final    HCT 12/15/2023 42.1  35.0 - 48.0 % Final    MCV 12/15/2023 94.0  80.0 - 100.0 fL Final    MCH 12/15/2023 31.0  26.0 - 34.0 pg Final    MCHC 12/15/2023 33.0  31.0 - 37.0 g/dL Final    RDW-SD 12/15/2023 47.8 (H)  35.1 - 46.3 fL Final    RDW 12/15/2023 13.8  11.0 - 15.0 % Final    PLT 12/15/2023 251.0  150.0 - 450.0 10(3)uL Final    Neutrophil Absolute Prelim 12/15/2023 2.50  1.50 - 7.70 x10 (3) uL Final    Neutrophil Absolute 12/15/2023 2.50  1.50 - 7.70 x10(3) uL Final    Lymphocyte Absolute 12/15/2023 1.79  1.00 - 4.00 x10(3) uL Final    Monocyte Absolute 12/15/2023 0.54  0.10 - 1.00 x10(3) uL Final    Eosinophil Absolute 12/15/2023 0.17  0.00 - 0.70 x10(3) uL Final    Basophil Absolute 12/15/2023 0.04  0.00 - 0.20 x10(3) uL Final    Immature Granulocyte Absolute 12/15/2023 0.01  0.00 - 1.00 x10(3) uL Final    Neutrophil % 12/15/2023 49.5  % Final    Lymphocyte % 12/15/2023 35.4  % Final    Monocyte % 12/15/2023 10.7  % Final    Eosinophil % 12/15/2023 3.4  % Final    Basophil % 12/15/2023 0.8  % Final    Immature Granulocyte % 12/15/2023  0.2  % Final    Vitamin D, 25OH, Total 12/15/2023 29.0 (L)  30.0 - 100.0 ng/mL Final    Literature Recommendations for 25(OH)D levels are:  Range           Vitamin D Status   <20    ng/mL      Deficiency   20-<30 ng/mL      Insufficiency    ng/mL      Sufficiency   >100   ng/mL      Toxicity    *Clinical controversy exists regarding optimal 25(OH)D levels. Emerging evidence links potential adverse effects to high levels, particularly >60 ng/mL.       Office Visit on 11/15/2023   Component Date Value Ref Range Status    HPV High Risk 11/15/2023 Negative  Negative Final    HPV Source 11/15/2023 Cervical/endocervical   Final    Interpretation/Result 11/15/2023 Negative for intraepithelial lesion or malignancy  Negative for intraepithelial lesion or malignancy Final    Other Findings 11/15/2023 Atrophic pattern.    Final    Specimen Adequacy 11/15/2023 Satisfactory for evaluation. Endocervical or metaplastic cells present   Final    General Categorization 11/15/2023 Negative for intraepithelial lesion or malignancy     Final    HPV High Risk mRNA 11/15/2023    Final                    Value:This result contains rich text formatting which cannot be displayed here.    Recommendations/Comments 11/15/2023    Final                    Value:This result contains rich text formatting which cannot be displayed here.    Procedure 11/15/2023    Final                    Value:This result contains rich text formatting which cannot be displayed here.    Clinical Information 11/15/2023    Final                    Value:This result contains rich text formatting which cannot be displayed here.    Reason for testing 11/15/2023 Screening   Final    Gyn Additional Information 11/15/2023    Final                    Value:This result contains rich text formatting which cannot be displayed here.    Case Report 11/15/2023    Final                    Value:Gynecologic Cytology                              Case: [id number]                                   Authorizing Provider:  Yamileth Serra CNM         Collected:           11/15/2023 06:36 PM          Ordering Location:     [facility]    Received:            11/16/2023 11:29 AM                                 [facility]                                                         First Screen:          Reggie Cabrera                                                               Rescreen:              Yovana Barton                                                               Specimen:    ThinPrep Imager Screening Pap, Cervical/endocervical                                      EE Lab Encounter on 09/09/2023   Component Date Value Ref Range Status    TSH 09/09/2023 0.870  0.358 - 3.740 mIU/mL Final    This test may exhibit interference when a sample is collected from a person who is consuming high dose of biotin (a.k.a., vitamin B7, vitamin H, coenzyme R) supplements resulting in serum concentrations >100 ng/mL.  Intake of the recommended daily allowance (RDA) for biotin (0.03 mg) has not been shown to typically cause significant interference; however, high dose daily dietary supplements may contain biotin concentrations greater than 150 times (5-10 mg) the RDA.  It is recommended that physicians ask all patients who may be on biotin supplementation to stop biotin consumption at least 72 hours prior to collection of a new sample.      Free T4 09/09/2023 0.8  0.8 - 1.7 ng/dL Final    If applicable: Pregnancy Reference Intervals  First trimester 10-13 weeks gestation    0.9-1.4 ng/dL  Second trimester 14-26 weeks gestation   0.7-1.3 ng/dL      This test may exhibit interference when a sample is collected from a person who is consuming high dose of biotin (a.k.a., vitamin B7, vitamin H, coenzyme R) supplements resulting in serum concentrations >100 ng/mL.  Intake of the recommended  daily allowance (RDA) for biotin (0.03 mg) has not been shown to typically cause significant interference; however, high dose daily dietary supplements may contain biotin concentrations greater than 150 times (5-10 mg) the RDA.  It is recommended that physicians ask all patients who may be on biotin supplementation to stop biotin consumption at least 72 hours prior to collection of a new sample.      T3 Free 09/09/2023 3.22  2.40 - 4.20 pg/mL Final    This test may exhibit interference when a sample is collected from a person who is consuming high dose of biotin (a.k.a., vitamin B7, vitamin H, coenzyme R) supplements resulting in serum concentrations >100 ng/mL.  Intake of the recommended daily allowance (RDA) for biotin (0.03 mg) has not been shown to typically cause significant interference; however, high dose daily dietary supplements may contain biotin concentrations greater than 150 times (5-10 mg) the RDA.  It is recommended that physicians ask all patients who may be on biotin supplementation to stop biotin consumption at least 72 hours prior to collection of a new sample.      Reverse T3 09/09/2023 13.2  9.2 - 24.1 ng/dL Final    Candida IgG Ab 09/09/2023 Negative  Negative Final    Candida IgM Ab IgM 09/09/2023 Negative  Negative Final                  **Please note reference interval change**    Candida IgA Ab 09/09/2023 Negative  Negative Final                  **Please note reference interval change**      CT ABDOMEN+PELVIS(CONTRAST ONLY)(CPT=74177)    Result Date: 12/28/2023  CONCLUSION:   Acute diverticulitis of the hepatic flexure has significantly improved but not entirely resolved since the prior exams.  Intussusception within the left upper quadrant involving loops of jejunum is new since the prior exam.  This is likely incidental.  No obstruction.  No extraluminal collection or extraluminal contrast.  No abscess.  If not recently performed, following resolution of patient's symptoms a colonoscopy  should be performed to exclude an underlying mass.  Pandiverticulosis.  Multiple other incidental findings as described in the body of the report.      Dictated by (CST): Gagandeep Lara MD on 12/28/2023 at 12:42 PM     Finalized by (CST): Gagandeep Lara MD on 12/28/2023 at 12:47 PM          CT ABDOMEN PELVIS IV CONTRAST, NO ORAL (ER)    Result Date: 12/25/2023  CONCLUSION:  Perforated colitis involving ascending colon near the hepatic flexure, likely due to perforated diverticulitis.  Phlegmonous collection predominantly of fat stranding with some air adjacent to the hepatic flexure measuring 6.2 centimeters.  There appears  to be a tract extending from this collection to colon at the hepatic flexure.  Extensive ascending colon diverticula.  Normal appendix.  Small amount of fluid tracking adjacent to the gallbladder is favored to be reactive, related to inflammatory findings in the right upper quadrant related to colonic perforation.  Findings were discussed with Arlyn Alvarado  on 12/25/2023 at 3:42 p.m.  Dictated by (CST): Yoly Ventura MD on 12/25/2023 at 3:28 PM     Finalized by (CST): Yoly Ventura MD on 12/25/2023 at 3:45 PM           1. Diverticulitis of large intestine with perforation without bleeding        Time spent with patient: Over 40 minutes spent in chart review and in direct communication with patient obtaining and reviewing history, creating a unique care plan, explaining the rationale for treatment, reviewing potential SE and overall treatment plan,  documenting all clinical information in Epic. Over 50% of this time was in education, counseling and coordination of care.     Problem List Items Addressed This Visit    None  Visit Diagnoses       Diverticulitis of large intestine with perforation without bleeding    -  Primary          Diverticulitis with perforation of large intestine on 12/25/2023.  Patient is improving after IV antibiotics and fluids in the hospital.  Was discharged on 12/29.   Encouraged to continue diet high in liquids and low in fiber.  Handout provided today to help with how to reintroduce fiber slowly.  Recommend probiotic below to take at opposite time of antibiotics.  Advise follow-up with gastroenterologist.    She did have symptoms of a slight fever and chills, as well as a cough yesterday, but her family is sick with the flu at home with the same symptoms.  No fever today in the office.     Encouraged continued rest with gentle stretching and daily walks.  Discussed how stress might be an inciting factor.     After 2 to 4 weeks she may introduce GlutaMed to support intestinal epithelial healing.     Should she develop severe abdominal pain again, nausea, fever and chills advised her to seek immediate care again.     Follow-up in 1 month.    Given further recommendations as below    Orders Placed This Visit:  No orders of the defined types were placed in this encounter.    No orders of the defined types were placed in this encounter.      Patient Instructions     Probiotic Supreme DF by [employer]  Dose: 1 tab nightly at least 2 hours away from antibiotics      2. After 2-4weeks, may initiate:  GlutaMed by [employer]    GlutaMed™ is a pleasant tasting formula that provides support for a healthy intestinal lining and optimal gastrointestinal function. A healthy intestinal lining is essential for digestion, liver function and immune function. This formula has been specifically developed with high levels of the amino acid glutamine, which serves as a fuel source and a precursor for growth of the rapidly dividing cells of the GI mucosal lining. Arabinogalactan is a naturally occurring polysaccharide that provides excellent support for GI health. The aloe in this formula is specially processed to remove the bitter principles and prevent any laxative effect.    Suggested Use:  Take one scoop (6.98 g) mixed in 2-4 ounces of water 1-2 times per day.    No follow-ups on  file.    Patient affirmed understanding of plan and all questions were answered.     Meggan Yañez PA-C

## 2024-01-03 NOTE — PATIENT INSTRUCTIONS
Probiotic Supreme DF by [employer]  Dose: 1 tab nightly at least 2 hours away from antibiotics      2. After 2-4weeks, may initiate:  GlutaMed by [employer]    GlutaMed™ is a pleasant tasting formula that provides support for a healthy intestinal lining and optimal gastrointestinal function. A healthy intestinal lining is essential for digestion, liver function and immune function. This formula has been specifically developed with high levels of the amino acid glutamine, which serves as a fuel source and a precursor for growth of the rapidly dividing cells of the GI mucosal lining. Arabinogalactan is a naturally occurring polysaccharide that provides excellent support for GI health. The aloe in this formula is specially processed to remove the bitter principles and prevent any laxative effect.    Suggested Use:  Take one scoop (6.98 g) mixed in 2-4 ounces of water 1-2 times per day.

## 2024-01-04 ENCOUNTER — MOBILE ENCOUNTER (OUTPATIENT)
Facility: CLINIC | Age: 59
End: 2024-01-04

## 2024-01-04 PROCEDURE — 99284 EMERGENCY DEPT VISIT MOD MDM: CPT

## 2024-01-04 NOTE — TELEPHONE ENCOUNTER
Office Visit  1/3/2024  [facility], Godwin Shipley Heather M., PA-C  Physician Assistant Medical Diverticulitis of large intestine with perforation without bleeding  Dx Follow - Up   Reason for Visit

## 2024-01-05 ENCOUNTER — HOSPITAL ENCOUNTER (EMERGENCY)
Facility: HOSPITAL | Age: 59
Discharge: HOME | End: 2024-01-05
Attending: EMERGENCY MEDICINE
Payer: COMMERCIAL

## 2024-01-05 VITALS
BODY MASS INDEX: 18.27 KG/M2 | DIASTOLIC BLOOD PRESSURE: 70 MMHG | TEMPERATURE: 99 F | WEIGHT: 107 LBS | OXYGEN SATURATION: 97 % | HEIGHT: 64 IN | RESPIRATION RATE: 20 BRPM | HEART RATE: 54 BPM | SYSTOLIC BLOOD PRESSURE: 112 MMHG

## 2024-01-05 DIAGNOSIS — J10.1 INFLUENZA A: Primary | ICD-10-CM

## 2024-01-05 LAB
ALBUMIN SERPL-MCNC: 4.5 G/DL (ref 3.2–4.8)
ALBUMIN/GLOB SERPL: 1.6 {RATIO} (ref 1–2)
ALP LIVER SERPL-CCNC: 62 U/L
ALT SERPL-CCNC: 18 U/L
ANION GAP SERPL CALC-SCNC: 11 MMOL/L (ref 0–18)
AST SERPL-CCNC: 34 U/L (ref ?–34)
BASOPHILS # BLD AUTO: 0.03 X10(3) UL (ref 0–0.2)
BASOPHILS NFR BLD AUTO: 0.5 %
BILIRUB SERPL-MCNC: 0.3 MG/DL (ref 0.3–1.2)
BUN BLD-MCNC: <5 MG/DL (ref 9–23)
CALCIUM BLD-MCNC: 8.8 MG/DL (ref 8.7–10.4)
CHLORIDE SERPL-SCNC: 94 MMOL/L (ref 98–112)
CO2 SERPL-SCNC: 24 MMOL/L (ref 21–32)
CREAT BLD-MCNC: 0.73 MG/DL
DEPRECATED RDW RBC AUTO: 40.1 FL (ref 35.1–46.3)
EGFRCR SERPLBLD CKD-EPI 2021: 96 ML/MIN/1.73M2 (ref 60–?)
EOSINOPHIL # BLD AUTO: 0.08 X10(3) UL (ref 0–0.7)
EOSINOPHIL NFR BLD AUTO: 1.4 %
ERYTHROCYTE [DISTWIDTH] IN BLOOD BY AUTOMATED COUNT: 12.4 % (ref 11–15)
FLUAV + FLUBV RNA SPEC NAA+PROBE: NEGATIVE
FLUAV + FLUBV RNA SPEC NAA+PROBE: POSITIVE
GLOBULIN PLAS-MCNC: 2.8 G/DL (ref 2.8–4.4)
GLUCOSE BLD-MCNC: 103 MG/DL (ref 70–99)
HCT VFR BLD AUTO: 41.8 %
HGB BLD-MCNC: 14.5 G/DL
IMM GRANULOCYTES # BLD AUTO: 0.03 X10(3) UL (ref 0–1)
IMM GRANULOCYTES NFR BLD: 0.5 %
LYMPHOCYTES # BLD AUTO: 1.59 X10(3) UL (ref 1–4)
LYMPHOCYTES NFR BLD AUTO: 26.9 %
MCH RBC QN AUTO: 30.6 PG (ref 26–34)
MCHC RBC AUTO-ENTMCNC: 34.7 G/DL (ref 31–37)
MCV RBC AUTO: 88.2 FL
MONOCYTES # BLD AUTO: 0.96 X10(3) UL (ref 0.1–1)
MONOCYTES NFR BLD AUTO: 16.3 %
NEUTROPHILS # BLD AUTO: 3.21 X10 (3) UL (ref 1.5–7.7)
NEUTROPHILS # BLD AUTO: 3.21 X10(3) UL (ref 1.5–7.7)
NEUTROPHILS NFR BLD AUTO: 54.4 %
PLATELET # BLD AUTO: 252 10(3)UL (ref 150–450)
POTASSIUM SERPL-SCNC: 3.4 MMOL/L (ref 3.5–5.1)
PROT SERPL-MCNC: 7.3 G/DL (ref 5.7–8.2)
RBC # BLD AUTO: 4.74 X10(6)UL
RSV RNA SPEC NAA+PROBE: NEGATIVE
SARS-COV-2 RNA RESP QL NAA+PROBE: NOT DETECTED
SODIUM SERPL-SCNC: 129 MMOL/L (ref 136–145)
WBC # BLD AUTO: 5.9 X10(3) UL (ref 4–11)

## 2024-01-05 PROCEDURE — 96361 HYDRATE IV INFUSION ADD-ON: CPT

## 2024-01-05 PROCEDURE — 0241U SARS-COV-2/FLU A AND B/RSV BY PCR (GENEXPERT): CPT | Performed by: EMERGENCY MEDICINE

## 2024-01-05 PROCEDURE — 96374 THER/PROPH/DIAG INJ IV PUSH: CPT

## 2024-01-05 PROCEDURE — 85025 COMPLETE CBC W/AUTO DIFF WBC: CPT | Performed by: EMERGENCY MEDICINE

## 2024-01-05 PROCEDURE — 80053 COMPREHEN METABOLIC PANEL: CPT | Performed by: EMERGENCY MEDICINE

## 2024-01-05 RX ORDER — ONDANSETRON 2 MG/ML
4 INJECTION INTRAMUSCULAR; INTRAVENOUS ONCE
Status: COMPLETED | OUTPATIENT
Start: 2024-01-05 | End: 2024-01-05

## 2024-01-05 RX ORDER — POTASSIUM CHLORIDE 20 MEQ/1
40 TABLET, EXTENDED RELEASE ORAL ONCE
Status: COMPLETED | OUTPATIENT
Start: 2024-01-05 | End: 2024-01-05

## 2024-01-05 RX ORDER — ONDANSETRON 8 MG/1
8 TABLET, ORALLY DISINTEGRATING ORAL EVERY 4 HOURS PRN
Qty: 10 TABLET | Refills: 0 | Status: SHIPPED | OUTPATIENT
Start: 2024-01-05 | End: 2024-01-12

## 2024-01-05 NOTE — PROGRESS NOTES
Patient called tonight 1/4 with abdominal tightness, nausea and inability to tolerate much PO intake. She had recent hospitalization for diverticulitis. She notes fevers that she attributes to URI from the flu -  and child with similar. I recommended she go to the ED for evaluation as the fevers could be from diverticulitis and with worsening abd pain she warrants repeat CT scan.

## 2024-01-05 NOTE — ED PROVIDER NOTES
Patient Seen in: [facility] Emergency Department    History     Chief Complaint   Patient presents with    Nausea    Abdominal Pain       HPI    58-year-old female with a history of recent perforated diverticulum on Augmentin after discharge who reports that over the last 3 days she has had rhinorrhea, cough, myalgias.  She denies any worsening abdominal pain.  She does report a trace amount of epigastric pain.  Denies any emesis or diarrhea.  Trace nausea    History reviewed.   Past Medical History:   Diagnosis Date    Allergic rhinitis     Allergy     Back problem     C2-3 mild central, C4-5 left mild foraminal, C5-6 right mod foraminal & left mild-mod, C6-7 mild-mod diffuse bulging discs 01/31/2019    C3-4 small central, C4-5 small central herniated discs 01/31/2019    C5-6 right mod foraminal stenosis 01/31/2019    Family history of basal cell carcinoma 07/07/2014    History of atypical nevus 07/07/2014    Hyperglycemia 06/05/2017    L3-4 grade 1 spondylolisthesis 06/05/2017    L3-4 mild-mod diffuse bulging disc 06/05/2017    L4-5 mild-mod central bulging disc with annular tear 06/05/2017    L4-5 mild-mod central stenosis 10/31/2017    L5-S1 left mild-mod, L3-4 left mild foraminal stenosis 06/05/2017    L5-S1 left paracentral mild-mod herniated disc 06/05/2017    left C7 radiculitis 08/27/2018    left L5-S1 radiculitis 06/05/2017    Seizure disorder (HCC)     Seizures (HCC)     Stroke (HCC)     Thyroid disorder        History reviewed.   Past Surgical History:   Procedure Laterality Date    ARTHROSCOPY OF JOINT UNLISTED      KNEE SURGERY      PHILIP BIOPSY STEREO NODULE 1 SITE RIGHT (CPT=19081)  01/05/2017    benign    OTHER SURGICAL HISTORY  2005    Brain and neck    OTHER SURGICAL HISTORY Left 1983    Knee    OTHER SURGICAL HISTORY  1985    Devated septum    SPINAL SURGERY - [facility]           Medications :  (Not in a hospital admission)       Family History   Problem Relation Age of Onset    Cancer Father          Stomach cancer    Hypertension Mother     Cancer Mother        Smoking Status:   Social History     Socioeconomic History    Marital status:    Tobacco Use    Smoking status: Never    Smokeless tobacco: Never   Vaping Use    Vaping Use: Never used   Substance and Sexual Activity    Alcohol use: Yes     Alcohol/week: 0.0 standard drinks of alcohol     Comment: Beer and Wine, Occasionally;     Drug use: No   Other Topics Concern    Caffeine Concern Yes     Comment: Soda, Coffee, 2 cups;     Exercise Yes     Comment: stretching and hep    History of tanning No    Pt has a pacemaker No    Pt has a defibrillator No    Reaction to local anesthetic No    Left Handed No    Right Handed Yes    Currently spends a great deal of time in the sun No    Hx of Spending Great Deal of Time in Sun Yes    Bad sunburns in the past Yes     Comment: once    Tanning Salons in the Past No    Hx of Radiation Treatments No       Constitutional and vital signs reviewed.      Social History and Family History elements reviewed from today, pertinent positives to the presenting problem noted.    Physical Exam     ED Triage Vitals [01/04/24 2244]   /70   Pulse 66   Resp 20   Temp 98.8 °F (37.1 °C)   Temp src Temporal   SpO2 99 %   O2 Device None (Room air)       All measures to prevent infection transmission during my interaction with the patient were taken. The patient was already wearing a droplet mask on my arrival to the room. Personal protective equipment was worn throughout the duration of the exam.  Handwashing was performed prior to and after the exam.  Stethoscope and any equipment used during my examination was cleaned with super sani-cloth germicidal wipes following the exam.     Physical Exam    General: NAD  Head: Normocephalic and atraumatic.  Mouth/Throat/Ears/Nose: Oropharynx is clear and moist.   Eyes: Conjunctivae and EOM are normal.   Neck: Normal range of motion. Supple.   Cardiovascular: Normal rate,  regular rhythm, normal heart sounds.  Respiratory/Chest: Clear and equal bilaterally. Exhibits no tenderness.  Gastrointestinal: Soft, non-tender, non-distended. Bowel sounds are normal.   Musculoskeletal:No swelling or deformity.   Neurological: Alert and appropriate. No focal deficits.   Skin: Skin is warm and dry. No pallor.  Psychiatric: Has a normal mood and affect.      ED Course        Labs Reviewed   COMP METABOLIC PANEL (14) - Abnormal; Notable for the following components:       Result Value    Glucose 103 (*)     Sodium 129 (*)     Potassium 3.4 (*)     Chloride 94 (*)     BUN <5 (*)     All other components within normal limits   SARS-COV-2/FLU A AND B/RSV BY PCR (GENEXPERT) - Abnormal; Notable for the following components:    Influenza A by PCR Positive (*)     All other components within normal limits    Narrative:     This test is intended for the qualitative detection and differentiation of SARS-CoV-2, influenza A, influenza B, and respiratory syncytial virus (RSV) viral RNA in nasopharyngeal or nares swabs from individuals suspected of respiratory viral infection consistent with COVID-19 by their healthcare provider. Signs and symptoms of respiratory viral infection due to SARS-CoV-2, influenza, and RSV can be similar.                                    Test performed using the Xpert Xpress SARS-CoV-2/FLU/RSV (real time RT-PCR)  assay on the GeneXpert instrument, [employer], [place] [address].                   This test is being used under the Food and Drug Administration's Emergency Use Authorization.                                    The authorized Fact Sheet for Healthcare Providers for this assay is available upon request from the laboratory.   CBC WITH DIFFERENTIAL WITH PLATELET    Narrative:     The following orders were created for panel order CBC With Differential With Platelet.                  Procedure                               Abnormality         Status                                      ---------                               -----------         ------                                     CBC W/ DIFFERENTIAL[[id number]]                              Final result                                                 Please view results for these tests on the individual orders.   CBC W/ DIFFERENTIAL       As Interpreted by me    Imaging Results Available and Reviewed while in ED: No results found.  ED Medications Administered:   Medications   ondansetron (Zofran) 4 MG/2ML injection 4 mg (4 mg Intravenous Given 1/5/24 0130)   sodium chloride 0.9 % IV bolus 1,000 mL (0 mL Intravenous Stopped 1/5/24 0308)   potassium chloride (K-Dur) tab 40 mEq (40 mEq Oral Given 1/5/24 0234)         MDM     Vitals:    01/04/24 2244 01/05/24 0130   BP: 112/70    Pulse: 66 54   Resp: 20    Temp: 98.8 °F (37.1 °C)    TempSrc: Temporal    SpO2: 99% 97%   Weight: 48.5 kg    Height: 162.6 cm (5' 4\")      *I personally reviewed and interpreted all ED vitals.    Pulse Ox: 97%, Room air, Normal     Monitor Interpretation:   normal sinus rhythm as interpreted by me.  The cardiac monitor was ordered given concern for electrolyte derangements.      Medical Decision Making      Differential Diagnosis/ Diagnostic Considerations: Influenza, worsening diverticulitis, COVID-19    Complicating Factors: The patient already has does not have any pertinent problems on file. to contribute to the complexity of this ED evaluation.    I reviewed prior chart records including most recent discharge summary from December 25 admission when patient had a complicated admission for perforated diverticulitis but did not require any type of surgical or procedural intervention and has been on Augmentin since discharge.  Patient arrives hemodynamically stable.  She has a nontender and reassuring abdominal exam.  Her labs are reviewed and notable on my interpretation for positive influenza finding.  She feels improved after supportive medications.  Zofran  ODT prescription was provided.  Patient and wife are comfortable with discharge plan to home.  We discussed strict return precautions.  Shared decision-making discussion regarding repeating her CT scan of her abdomen and pelvis however patient declines which I think is reasonable given that she has a nontender abdomen, tolerating p.o., and her symptoms are more likely attributable at this time to influenza however we discussed strict return precautions.        Disposition and Plan     Clinical Impression:  1. Influenza A        Disposition:  Discharge    Follow-up:  Emanuel Whatley MD  [address] [place] [address] [id number] [phone]    Schedule an appointment as soon as possible for a visit in 1 day(s)        Medications Prescribed:  Discharge Medication List as of 1/5/2024  3:08 AM        START taking these medications    Details   ondansetron 8 MG Oral Tablet Dispersible Take 1 tablet (8 mg total) by mouth every 4 (four) hours as needed for Nausea., Normal, Disp-10 tablet, R-0

## 2024-01-06 ENCOUNTER — TELEPHONE (OUTPATIENT)
Dept: INTERNAL MEDICINE CLINIC | Facility: CLINIC | Age: 59
End: 2024-01-06

## 2024-01-06 NOTE — PROGRESS NOTES
Ms. Danielson was seen in the ED yesterday 1/5/2024, swabbed positive for influenza A.  Routine labs showed normal WBC 5900, but electrolyte abnormalities including serum sodium 129 potassium 3.4, normal renal function and normal LFTs.    2 recent CT scans 12/25/2023 and 12/28/2023 reviewed; no additional scan was performed yesterday.  Initial scan shows fairly severe perforated colitis/diverticulitis involving ascending colon near the hepatic flexure.  Large phlegmon.  There is significant interval improvement on the scan of 12/28/2023.    I recently performed EGD and colonoscopy examinations 8/18/2022, reassuring.    - cb

## 2024-01-06 NOTE — TELEPHONE ENCOUNTER
Patient called, verified Name and . States that she was seen in ED yesterday for worsening URI symptoms and tested positive for flu. However, prior to that, she was hospitalized for diverticulitis, and was prescribed antibiotics which she has 3 days left. Reports she started having diarrhea (loose stools x 5) and bad cramping last night and cannot keep anything down. Only had one loose stool today. Care advice given per protocol - hydration (water or electrolyte drink), eat bland foods, Imodium. Advised that if symptoms worsen, she should go to ED/IC for prompt evaluation and treatment. Appointment for followup already scheduled. Patient verbalized understanding and agreed with plan of care.     Future Appointments   Date Time Provider Department Center   2024  9:40 AM Kyung Bernstein MD [facility]   2024  9:30 AM [facility]   2024  3:30 PM Meggan Yañez, PA-C [facility]

## 2024-01-08 ENCOUNTER — OFFICE VISIT (OUTPATIENT)
Dept: INTERNAL MEDICINE CLINIC | Facility: CLINIC | Age: 59
End: 2024-01-08
Payer: COMMERCIAL

## 2024-01-08 ENCOUNTER — LAB ENCOUNTER (OUTPATIENT)
Dept: LAB | Age: 59
End: 2024-01-08
Attending: INTERNAL MEDICINE
Payer: COMMERCIAL

## 2024-01-08 VITALS
OXYGEN SATURATION: 98 % | HEIGHT: 64 IN | HEART RATE: 76 BPM | DIASTOLIC BLOOD PRESSURE: 64 MMHG | BODY MASS INDEX: 17.99 KG/M2 | SYSTOLIC BLOOD PRESSURE: 92 MMHG | WEIGHT: 105.38 LBS

## 2024-01-08 DIAGNOSIS — J10.1 INFLUENZA A: ICD-10-CM

## 2024-01-08 DIAGNOSIS — E87.1 HYPONATREMIA: ICD-10-CM

## 2024-01-08 DIAGNOSIS — K57.20 PERFORATED DIVERTICULUM OF LARGE INTESTINE: ICD-10-CM

## 2024-01-08 DIAGNOSIS — E87.1 HYPONATREMIA: Primary | ICD-10-CM

## 2024-01-08 LAB
ANION GAP SERPL CALC-SCNC: 6 MMOL/L (ref 0–18)
BUN BLD-MCNC: 10 MG/DL (ref 9–23)
BUN/CREAT SERPL: 12.2 (ref 10–20)
CALCIUM BLD-MCNC: 9.5 MG/DL (ref 8.7–10.4)
CHLORIDE SERPL-SCNC: 98 MMOL/L (ref 98–112)
CO2 SERPL-SCNC: 31 MMOL/L (ref 21–32)
CREAT BLD-MCNC: 0.82 MG/DL
EGFRCR SERPLBLD CKD-EPI 2021: 83 ML/MIN/1.73M2 (ref 60–?)
FASTING STATUS PATIENT QL REPORTED: NO
GLUCOSE BLD-MCNC: 167 MG/DL (ref 70–99)
OSMOLALITY SERPL CALC.SUM OF ELEC: 283 MOSM/KG (ref 275–295)
POTASSIUM SERPL-SCNC: 3.8 MMOL/L (ref 3.5–5.1)
SODIUM SERPL-SCNC: 135 MMOL/L (ref 136–145)

## 2024-01-08 PROCEDURE — 3078F DIAST BP <80 MM HG: CPT | Performed by: INTERNAL MEDICINE

## 2024-01-08 PROCEDURE — 3008F BODY MASS INDEX DOCD: CPT | Performed by: INTERNAL MEDICINE

## 2024-01-08 PROCEDURE — 80048 BASIC METABOLIC PNL TOTAL CA: CPT

## 2024-01-08 PROCEDURE — 99213 OFFICE O/P EST LOW 20 MIN: CPT | Performed by: INTERNAL MEDICINE

## 2024-01-08 PROCEDURE — 3074F SYST BP LT 130 MM HG: CPT | Performed by: INTERNAL MEDICINE

## 2024-01-08 PROCEDURE — 36415 COLL VENOUS BLD VENIPUNCTURE: CPT

## 2024-01-08 NOTE — PROGRESS NOTES
Tricia Danielson is a 58 year old female who is here for  1. Hyponatremia    2. Influenza A    3. Perforated diverticulum of large intestine          HPI:   Tricia Danielson is a 58 year old female  presents for follow up after ER visit yesterday. She has been having some nausea and abdominal pain and diarrhea, called GI and was told to go to the ER, Flu +. Symptoms started last Monday, today improved. No dizziness or weakness. Has an appointment with GI 1/29/2024.    She was in the hospital from 12/25 tp 12/29 for Perforated ascending colon in the hepatic flexure diverticulitis with phlegmonous gas collection, no surgery done, improved clinically with NPO and advancing diet as tolerated, on zosyn initially then discharged on 10 days of Augmentin BID done yesterday.    Past Medical History:   Diagnosis Date    Allergic rhinitis     Allergy     Back problem     C2-3 mild central, C4-5 left mild foraminal, C5-6 right mod foraminal & left mild-mod, C6-7 mild-mod diffuse bulging discs 01/31/2019    C3-4 small central, C4-5 small central herniated discs 01/31/2019    C5-6 right mod foraminal stenosis 01/31/2019    Family history of basal cell carcinoma 07/07/2014    History of atypical nevus 07/07/2014    Hyperglycemia 06/05/2017    L3-4 grade 1 spondylolisthesis 06/05/2017    L3-4 mild-mod diffuse bulging disc 06/05/2017    L4-5 mild-mod central bulging disc with annular tear 06/05/2017    L4-5 mild-mod central stenosis 10/31/2017    L5-S1 left mild-mod, L3-4 left mild foraminal stenosis 06/05/2017    L5-S1 left paracentral mild-mod herniated disc 06/05/2017    left C7 radiculitis 08/27/2018    left L5-S1 radiculitis 06/05/2017    Seizure disorder (HCC)     Seizures (HCC)     Stroke (HCC)     Thyroid disorder      Past Surgical History:   Procedure Laterality Date    ARTHROSCOPY OF JOINT UNLISTED      KNEE SURGERY      PHILIP BIOPSY STEREO NODULE 1 SITE RIGHT (CPT=19081)  01/05/2017    benign    OTHER SURGICAL HISTORY   2005    Brain and neck    OTHER SURGICAL HISTORY Left 1983    Knee    OTHER SURGICAL HISTORY  1985    Devated septum    SPINAL SURGERY - [facility]         Current Outpatient Medications:     ondansetron 8 MG Oral Tablet Dispersible, Take 1 tablet (8 mg total) by mouth every 4 (four) hours as needed for Nausea., Disp: 10 tablet, Rfl: 0    amoxicillin clavulanate 875-125 MG Oral Tab, Take 1 tablet by mouth 2 (two) times daily., Disp: 20 tablet, Rfl: 0    thyroid (ARMOUR THYROID) 15 MG Oral Tab, 1 tablet 30-60min before food and other meds/supplements., Disp: 90 tablet, Rfl: 2    HYDROcodone-acetaminophen 5-325 MG Oral Tab, Take 1-2 tablets by mouth every 4 (four) hours as needed for Pain. (Patient not taking: Reported on 1/8/2024), Disp: 10 tablet, Rfl: 0    Turmeric (QC TUMERIC COMPLEX OR), Take 1 tablet by mouth daily. (Patient not taking: Reported on 1/3/2024), Disp: , Rfl:     Multiple Vitamins-Minerals (MULTI-VITAMIN/MINERALS) Oral Tab, Take 1 tablet by mouth daily. (Patient not taking: Reported on 1/3/2024), Disp: , Rfl:     estradiol 0.1 MG/GM Vaginal Cream, Place 0.5 g vaginally daily for 14 days, THEN 0.5 g twice a week. (Patient not taking: Reported on 1/3/2024), Disp: 1 each, Rfl: 2    thyroid (ARMOUR THYROID) 15 MG Oral Tab, Take 3 tablets (45 mg total) by mouth daily. Take 1 tablet 30-60min before food and other meds/supplements (Patient not taking: Reported on 1/8/2024), Disp: 270 tablet, Rfl: 1    Cetirizine-Pseudoephedrine ER (ZYRTEC-D ALLERGY & CONGESTION) 5-120 MG Oral Tablet 12 Hr, Take 1 tablet by mouth 2 (two) times daily. (Patient not taking: Reported on 1/3/2024), Disp: 24 tablet, Rfl: 3    Probiotic Product (PROBIOTIC DAILY OR), Take by mouth daily. (Patient not taking: Reported on 1/3/2024), Disp: , Rfl:     Allergies:No Known Allergies  Social History     Socioeconomic History    Marital status:      Spouse name: Not on file    Number of children: Not on file    Years of education: Not on  file    Highest education level: Not on file   Occupational History    Not on file   Tobacco Use    Smoking status: Never    Smokeless tobacco: Never   Vaping Use    Vaping Use: Never used   Substance and Sexual Activity    Alcohol use: Yes     Alcohol/week: 0.0 standard drinks of alcohol     Comment: Beer and Wine, Occasionally;     Drug use: No    Sexual activity: Not on file   Other Topics Concern     Service Not Asked    Blood Transfusions Not Asked    Caffeine Concern Yes     Comment: Soda, Coffee, 2 cups;     Occupational Exposure Not Asked    Hobby Hazards Not Asked    Sleep Concern Not Asked    Stress Concern Not Asked    Weight Concern Not Asked    Special Diet Not Asked    Back Care Not Asked    Exercise Yes     Comment: stretching and hep    Bike Helmet Not Asked    Seat Belt Not Asked    Self-Exams Not Asked    Grew up on a farm Not Asked    History of tanning No    Outdoor occupation Not Asked    Pt has a pacemaker No    Pt has a defibrillator No    Breast feeding Not Asked    Reaction to local anesthetic No    Left Handed No    Right Handed Yes    Currently spends a great deal of time in the sun No    Past Sunlamp Treatments for Acne Not Asked    Hx of Spending Great Deal of Time in Sun Yes    Bad sunburns in the past Yes     Comment: once    Tanning Salons in the Past No    Hx of Radiation Treatments No    Regular use of sun block Not Asked   Social History Narrative    The patient does not use an assistive device..      The patient does live in a home with stairs.     Social Determinants of Health     Financial Resource Strain: Low Risk  (1/2/2024)    Financial Resource Strain     Difficulty of Paying Living Expenses: Not very hard     Med Affordability: No   Food Insecurity: No Food Insecurity (12/25/2023)    Food Insecurity     Food Insecurity: Never true   Transportation Needs: No Transportation Needs (1/2/2024)    Transportation Needs     Lack of Transportation: No   Physical Activity:  Not on file   Stress: Not on file   Social Connections: Not on file   Housing Stability: Low Risk  (12/25/2023)    Housing Stability     Housing Instability: No     Housing Instability Emergency: Not on file       REVIEW OF SYSTEMS:     GENERAL HEALTH: No fevers, chills, sweats, fatigue  VISION: No recent vision problems, blurry vision or double vision  HEENT: No decreased hearing ear pain nasal congestion or sore throat  SKIN: denies any unusual skin lesions or rashes  RESPIRATORY: denies shortness of breath, cough, wheezing  CARDIOVASCULAR: denies chest pain on exertion, palpitations, swelling in feet  GI: denies abdominal pain and denies heartburn, nausea or vomiting  : No Pain on urination, change in the color of urine, discharge, urinating frequently  MUS: No back pain, joint pain, muscle pain  NEURO: denies headaches , anxiety, depression    EXAM:     Vitals:    01/08/24 0939   BP: 92/64   Pulse: 76   SpO2: 98%   Weight: 105 lb 6.4 oz (47.8 kg)   Height: 5' 4\" (1.626 m)     GENERAL: well developed, mild apparent distress  SKIN: no rashes,no suspicious lesions  HEENT: atraumatic, normocephalic,ears and throat are clear,   NECK: supple,no adenopathy,  LUNGS: clear to auscultation, no wheeze  CARDIO: RRR without murmur  GI: good BS's,no masses or tenderness  EXTREMITIES: no cyanosis, or edema    ASSESSMENT AND PLAN:   1. Hyponatremia  2. Influenza A  3. Perforated diverticulum of large intestine  - recent hospital admission for perforated acute diverticulitis that was treated conservatively with oral antibiotics and advancing diet  -recent influenza A+ ER 1/6/2024  - symptoms for over a week, getting better  -labs significant for hyponatremia and hypokalemia  -patient has been only drinking water, eating still soft diet but not tolerating much  -passing gas and having BM, no abdominal pain  Plan  - Basic Metabolic Panel (8) [E]; Future  -discussed drinking Gatorade and more broth  -follow up with GI end of  month    The patient indicates understanding of these issues and agrees to the plan.    Return if symptoms worsen or fail to improve.    Kyung Bernstein MD  1/8/2024

## 2024-01-18 ENCOUNTER — PATIENT MESSAGE (OUTPATIENT)
Dept: INTERNAL MEDICINE CLINIC | Facility: CLINIC | Age: 59
End: 2024-01-18

## 2024-01-19 ENCOUNTER — NURSE TRIAGE (OUTPATIENT)
Dept: INTERNAL MEDICINE CLINIC | Facility: CLINIC | Age: 59
End: 2024-01-19

## 2024-01-19 DIAGNOSIS — R30.0 DYSURIA: Primary | ICD-10-CM

## 2024-01-19 DIAGNOSIS — N39.0 URINARY TRACT INFECTION WITHOUT HEMATURIA, SITE UNSPECIFIED: ICD-10-CM

## 2024-01-19 NOTE — TELEPHONE ENCOUNTER
Reason for Disposition   Age > 50 years    Protocols used: Urination Pain - Female-A-[place]  Action Requested: Summary for Provider     []  Critical Lab, Recommendations Needed  [x] Need Additional Advice  []   FYI    []   Need Orders  [] Need Medications Sent to Pharmacy  []  Other     SUMMARY: \"I feel like I could possibly have a UTI due to frequent urination and small amounts when urinating with some discomfort. I was wondering if I can get an order for an urine test or something for a UTI.   Pt began with urinary frequency and burning yesterday. Denies fever, back, flank pain, hematuria.  Please advise on request for lab orders.     Reason for call: Urinary Symptoms  Onset: Data Unavailable

## 2024-01-19 NOTE — TELEPHONE ENCOUNTER
From: Tricia Danielson  To: Emanuel Whatley  Sent: 1/18/2024 3:59 PM CST  Subject: UTI    Hi Dr. Whatley,     I feel like I could possibly have a UTI due to frequent urination and small amounts when urinating with some discomfort. I was wondering if I can get an order for an urine test or something for a UTI.     Thank you,  Tricia

## 2024-01-20 ENCOUNTER — LAB ENCOUNTER (OUTPATIENT)
Dept: LAB | Age: 59
End: 2024-01-20
Attending: INTERNAL MEDICINE
Payer: COMMERCIAL

## 2024-01-20 DIAGNOSIS — R39.9 UTI SYMPTOMS: ICD-10-CM

## 2024-01-20 LAB
BILIRUB UR QL: NEGATIVE
COLOR UR: YELLOW
GLUCOSE UR-MCNC: NORMAL MG/DL
LEUKOCYTE ESTERASE UR QL STRIP.AUTO: 500
NITRITE UR QL STRIP.AUTO: NEGATIVE
PH UR: 6.5 [PH] (ref 5–8)
PROT UR-MCNC: 300 MG/DL
RBC #/AREA URNS AUTO: >10 /HPF
SP GR UR STRIP: 1.02 (ref 1–1.03)
UROBILINOGEN UR STRIP-ACNC: NORMAL
WBC #/AREA URNS AUTO: >50 /HPF

## 2024-01-20 PROCEDURE — 87086 URINE CULTURE/COLONY COUNT: CPT

## 2024-01-20 PROCEDURE — 87186 SC STD MICRODIL/AGAR DIL: CPT

## 2024-01-20 PROCEDURE — 81001 URINALYSIS AUTO W/SCOPE: CPT

## 2024-01-20 PROCEDURE — 87077 CULTURE AEROBIC IDENTIFY: CPT

## 2024-01-21 ENCOUNTER — PATIENT MESSAGE (OUTPATIENT)
Dept: INTERNAL MEDICINE CLINIC | Facility: CLINIC | Age: 59
End: 2024-01-21

## 2024-01-22 RX ORDER — NITROFURANTOIN 25; 75 MG/1; MG/1
100 CAPSULE ORAL 2 TIMES DAILY
Qty: 14 CAPSULE | Refills: 0 | Status: SHIPPED | OUTPATIENT
Start: 2024-01-22 | End: 2024-01-29

## 2024-01-22 NOTE — TELEPHONE ENCOUNTER
Informed patient that Macrobid was sent to the pharmacy today, she needs to take it twice a day for 7 days . Stated  understanding .       LABS 1/20/24;   Lisa Sethi RN  1/22/2024 10:38 AM CST Back to Top      Called patient, she started with symptoms of UTI last Wednesday evening, Thursday morning. Burning, discomfort, frequency and pressure. She is able to pass urine ok but asking if medication will be called in. Patient uses [facility].       Urinalysis with Culture Reflex: Patient Communication     Append Comments   Seen    Send letter and copy of test result.  Urine growing E coli  Final culture  sensitivity pending     Call pt  any UTI symptoms?   Written by Emanuel Whatley MD on 1/22/2024 12:37 AM CST  Seen by patient Tricia C Erum on 1/22/2024  7:45 AM        FINAL urine culture 1/20/24;;  Susceptibility     Escherichia coli     Not Specified    Ampicillin >=32 Resistant    Ampicillin + Sulbactam 16 Intermediate    Cefazolin <=4 Sensitive    Ciprofloxacin 1 Sensitive    Gentamicin <=1 Sensitive    Levofloxacin 1 Sensitive    Meropenem <=0.25 Sensitive    Nitrofurantoin <=16 Sensitive    Piperacillin + Tazobactam <=4 Sensitive    Trimethoprim/Sulfa >=320 Resistant                       MEDICATION RECORD ;   Disp Refills Start End    nitrofurantoin monohydrate macro 100 MG Oral Cap 14 capsule 0 1/22/2024 1/29/2024    Sig - Route: Take 1 capsule (100 mg total) by mouth 2 (two) times daily for 7 days. - Oral    Sent to pharmacy as: Nitrofurantoin Monohyd Macro 100 MG Oral Capsule (Macrobid)    E-Prescribing Status: Receipt confirmed by pharmacy (1/22/2024 12:22 PM CST)      Pharmacy    [id number] - [place], [place] - [address] [place] [address] [phone], [phone]

## 2024-01-22 NOTE — TELEPHONE ENCOUNTER
PHILL Groves, (on behalf of Dr Dr Whatley)=pended macrobid 100 mg  prescription , is this the correct dose ? Thanks.

## 2024-01-22 NOTE — TELEPHONE ENCOUNTER
Urine growing E coli  Final result pending    Symptomatic  Give macrobid bid for 7 days pending final result

## 2024-01-22 NOTE — TELEPHONE ENCOUNTER
E coli sensitive to macrobid  take as prescribed  Order gregory to ;lk;;k;;wqk  Ll;lk;l;kkl;m;colette

## 2024-01-29 ENCOUNTER — OFFICE VISIT (OUTPATIENT)
Facility: CLINIC | Age: 59
End: 2024-01-29
Payer: COMMERCIAL

## 2024-01-29 VITALS
HEART RATE: 88 BPM | BODY MASS INDEX: 18.27 KG/M2 | SYSTOLIC BLOOD PRESSURE: 125 MMHG | DIASTOLIC BLOOD PRESSURE: 79 MMHG | HEIGHT: 64 IN | WEIGHT: 107 LBS

## 2024-01-29 DIAGNOSIS — R19.4 CHANGE IN BOWEL HABITS: ICD-10-CM

## 2024-01-29 DIAGNOSIS — K57.20 PERFORATED DIVERTICULUM OF LARGE INTESTINE: ICD-10-CM

## 2024-01-29 DIAGNOSIS — K57.20 DIVERTICULITIS OF LARGE INTESTINE WITH PERFORATION WITHOUT BLEEDING: ICD-10-CM

## 2024-01-29 DIAGNOSIS — R14.0 ABDOMINAL BLOATING: ICD-10-CM

## 2024-01-29 DIAGNOSIS — Z80.0 FAMILY HISTORY OF GASTRIC CANCER: ICD-10-CM

## 2024-01-29 DIAGNOSIS — R93.3 ABNORMAL CT SCAN, COLON: Primary | ICD-10-CM

## 2024-01-29 PROCEDURE — 3078F DIAST BP <80 MM HG: CPT | Performed by: INTERNAL MEDICINE

## 2024-01-29 PROCEDURE — 99215 OFFICE O/P EST HI 40 MIN: CPT | Performed by: INTERNAL MEDICINE

## 2024-01-29 PROCEDURE — 3074F SYST BP LT 130 MM HG: CPT | Performed by: INTERNAL MEDICINE

## 2024-01-29 PROCEDURE — 3008F BODY MASS INDEX DOCD: CPT | Performed by: INTERNAL MEDICINE

## 2024-01-30 ENCOUNTER — LAB ENCOUNTER (OUTPATIENT)
Dept: LAB | Facility: HOSPITAL | Age: 59
End: 2024-01-30
Attending: INTERNAL MEDICINE
Payer: COMMERCIAL

## 2024-01-30 DIAGNOSIS — N39.0 URINARY TRACT INFECTION WITHOUT HEMATURIA, SITE UNSPECIFIED: ICD-10-CM

## 2024-01-30 LAB
BILIRUB UR QL: NEGATIVE
CLARITY UR: CLEAR
COLOR UR: YELLOW
GLUCOSE UR-MCNC: NORMAL MG/DL
HGB UR QL STRIP.AUTO: NEGATIVE
KETONES UR-MCNC: NEGATIVE MG/DL
LEUKOCYTE ESTERASE UR QL STRIP.AUTO: 500
NITRITE UR QL STRIP.AUTO: NEGATIVE
PH UR: 6.5 [PH] (ref 5–8)
PROT UR-MCNC: 20 MG/DL
SP GR UR STRIP: 1.01 (ref 1–1.03)
UROBILINOGEN UR STRIP-ACNC: NORMAL
WBC #/AREA URNS AUTO: >50 /HPF

## 2024-01-30 PROCEDURE — 87086 URINE CULTURE/COLONY COUNT: CPT

## 2024-01-30 PROCEDURE — 81001 URINALYSIS AUTO W/SCOPE: CPT

## 2024-02-19 ENCOUNTER — HOSPITAL ENCOUNTER (OUTPATIENT)
Dept: CT IMAGING | Facility: HOSPITAL | Age: 59
Discharge: HOME | End: 2024-02-19
Attending: INTERNAL MEDICINE
Payer: COMMERCIAL

## 2024-02-19 DIAGNOSIS — K57.20 DIVERTICULITIS OF LARGE INTESTINE WITH PERFORATION WITHOUT BLEEDING: ICD-10-CM

## 2024-02-19 DIAGNOSIS — R19.4 CHANGE IN BOWEL HABITS: ICD-10-CM

## 2024-02-19 DIAGNOSIS — R14.0 ABDOMINAL BLOATING: ICD-10-CM

## 2024-02-19 DIAGNOSIS — R93.3 ABNORMAL CT SCAN, COLON: ICD-10-CM

## 2024-02-19 LAB
CREAT BLD-MCNC: 0.7 MG/DL
EGFRCR SERPLBLD CKD-EPI 2021: 101 ML/MIN/1.73M2 (ref 60–?)

## 2024-02-19 PROCEDURE — 74177 CT ABD & PELVIS W/CONTRAST: CPT | Performed by: INTERNAL MEDICINE

## 2024-02-19 PROCEDURE — 82565 ASSAY OF CREATININE: CPT

## 2024-02-21 ENCOUNTER — HOSPITAL ENCOUNTER (OUTPATIENT)
Dept: MAMMOGRAPHY | Facility: HOSPITAL | Age: 59
Discharge: HOME | End: 2024-02-21
Attending: ADVANCED PRACTICE MIDWIFE
Payer: COMMERCIAL

## 2024-02-21 DIAGNOSIS — Z12.31 ENCOUNTER FOR SCREENING MAMMOGRAM FOR MALIGNANT NEOPLASM OF BREAST: ICD-10-CM

## 2024-02-21 PROCEDURE — 77067 SCR MAMMO BI INCL CAD: CPT | Performed by: ADVANCED PRACTICE MIDWIFE

## 2024-02-21 PROCEDURE — 77063 BREAST TOMOSYNTHESIS BI: CPT | Performed by: ADVANCED PRACTICE MIDWIFE

## 2024-02-26 ENCOUNTER — TELEPHONE (OUTPATIENT)
Dept: OBGYN CLINIC | Facility: CLINIC | Age: 59
End: 2024-02-26

## 2024-02-26 DIAGNOSIS — R92.30 DENSE BREAST TISSUE ON MAMMOGRAM, UNSPECIFIED TYPE: Primary | ICD-10-CM

## 2024-02-26 NOTE — TELEPHONE ENCOUNTER
----- Message from Yamileth Serra CNM sent at 2/24/2024 10:33 AM CST -----  Please notify pt of normal mammogram. She does have dense breasts which   reduces mammographic sensitivity for breast cancer. Screening whole breast ultrasound, used as an adjunct to mammography, can detect breast cancers that might be obscured by dense breast tissue on mammography. See if she is interested in whole breast u/s. If so please place order.

## 2024-02-26 NOTE — TELEPHONE ENCOUNTER
Name and  verified    Patient given  MES results and verbalized understanding. Desires breast ultrasound order placed and scheduling information provided.

## 2024-02-28 ENCOUNTER — OFFICE VISIT (OUTPATIENT)
Dept: INTEGRATIVE MEDICINE | Facility: CLINIC | Age: 59
End: 2024-02-28
Payer: COMMERCIAL

## 2024-02-28 VITALS
SYSTOLIC BLOOD PRESSURE: 98 MMHG | DIASTOLIC BLOOD PRESSURE: 60 MMHG | WEIGHT: 108.38 LBS | BODY MASS INDEX: 18.5 KG/M2 | HEIGHT: 64 IN

## 2024-02-28 DIAGNOSIS — R68.89 COLD INTOLERANCE: ICD-10-CM

## 2024-02-28 DIAGNOSIS — R79.89 LOW VITAMIN D LEVEL: ICD-10-CM

## 2024-02-28 DIAGNOSIS — E03.9 HYPOTHYROIDISM, UNSPECIFIED TYPE: Primary | ICD-10-CM

## 2024-02-28 DIAGNOSIS — K59.00 CONSTIPATION, UNSPECIFIED CONSTIPATION TYPE: ICD-10-CM

## 2024-02-28 PROCEDURE — 3074F SYST BP LT 130 MM HG: CPT | Performed by: PHYSICIAN ASSISTANT

## 2024-02-28 PROCEDURE — 3008F BODY MASS INDEX DOCD: CPT | Performed by: PHYSICIAN ASSISTANT

## 2024-02-28 PROCEDURE — 3078F DIAST BP <80 MM HG: CPT | Performed by: PHYSICIAN ASSISTANT

## 2024-02-28 PROCEDURE — 99214 OFFICE O/P EST MOD 30 MIN: CPT | Performed by: PHYSICIAN ASSISTANT

## 2024-02-28 NOTE — PATIENT INSTRUCTIONS
Tee Thyroid Medication instructions changed. Let us know which pharmacy you would like us to send the refill to when it is time.

## 2024-02-28 NOTE — PROGRESS NOTES
Tricia Danielson is a 58 year old female.  Chief Complaint   Patient presents with    Follow - Up     hypothyroidism       HPI:   57yo female with known h/o hypothyroidism presents for f/u.    Last labs 12/15/23.     S/P perforated diverticulitis December -symptomatically resolved. Has started to eat more, but noticed she felt better with smaller amounts of food. Otherwise feels sluggish and weighted down. Still trying to do small amounts instead of larger meals.   [facility] had her start the metamucil every morning. Helps with her being shantel regular.   Had f/u abdominal CT and   H/o Dysbiosis     Now at goal weight.    Hypothyroid - was taking only 15mg instead of 45mg daily due to error in Sig. She did notice she was more fatigued and cold with lower dose.   Some brain fog, fatigue still remains.    Some improvement in constipation, fatigue, brittle hair, and cold intolerance, and no longer feeling \"puffy all over.\"   Bloating much improved.     Saw Midwife at [facility] for Pap and she recommended she start on the Estradiol vaginal cream due to atrophic tissue -has not started yet.  Patient denies frequent UTIs, dyspareunia.    Currently on the Probiotic, MitoCore (x2/d), Turmeric.     H/o Raynaud's (17-17yo)      REVIEW OF SYSTEMS:   Review of Systems   Constitutional:  Positive for fatigue. Negative for chills and fever.   HENT: Negative.     Eyes: Negative.  Negative for visual disturbance.   Respiratory: Negative.  Negative for cough, shortness of breath and wheezing.    Cardiovascular: Negative.  Negative for chest pain and palpitations.   Gastrointestinal:  Positive for constipation. Negative for abdominal distention, abdominal pain and diarrhea.   Endocrine: Positive for cold intolerance.   Genitourinary: Negative.    Musculoskeletal: Negative.    Skin: Negative.    Allergic/Immunologic: Negative.    Neurological: Negative.  Negative for weakness and headaches.   Hematological: Negative.    Psychiatric/Behavioral:  Negative.  Negative for sleep disturbance.             FAMILY HISTORY:      Family History   Problem Relation Age of Onset    Cancer Father         Stomach cancer    Hypertension Mother     Cancer Mother      Father passed in 40s from gastric cancer  MEDICAL HISTORY:     Past Medical History:   Diagnosis Date    Allergic rhinitis     Allergy     Back problem     C2-3 mild central, C4-5 left mild foraminal, C5-6 right mod foraminal & left mild-mod, C6-7 mild-mod diffuse bulging discs 01/31/2019    C3-4 small central, C4-5 small central herniated discs 01/31/2019    C5-6 right mod foraminal stenosis 01/31/2019    Constipation     Diverticular disease 1/25/23    Family history of basal cell carcinoma 07/07/2014    History of atypical nevus 07/07/2014    Hyperglycemia 06/05/2017    L3-4 grade 1 spondylolisthesis 06/05/2017    L3-4 mild-mod diffuse bulging disc 06/05/2017    L4-5 mild-mod central bulging disc with annular tear 06/05/2017    L4-5 mild-mod central stenosis 10/31/2017    L5-S1 left mild-mod, L3-4 left mild foraminal stenosis 06/05/2017    L5-S1 left paracentral mild-mod herniated disc 06/05/2017    left C7 radiculitis 08/27/2018    left L5-S1 radiculitis 06/05/2017    Seizure disorder (HCC)     Seizures (HCC)     Stroke (HCC)     Thyroid disorder        CURRENT MEDICATIONS:     Current Outpatient Medications   Medication Sig Dispense Refill    Turmeric (QC TUMERIC COMPLEX OR) Take 1 tablet by mouth daily.      Multiple Vitamins-Minerals (MULTI-VITAMIN/MINERALS) Oral Tab Take 1 tablet by mouth daily.      thyroid (ARMOUR THYROID) 15 MG Oral Tab Take 3 tablets (45 mg total) by mouth daily. Take 1 tablet 30-60min before food and other meds/supplements 270 tablet 1    Cetirizine-Pseudoephedrine ER (ZYRTEC-D ALLERGY & CONGESTION) 5-120 MG Oral Tablet 12 Hr Take 1 tablet by mouth 2 (two) times daily. 24 tablet 3    Probiotic Product (PROBIOTIC DAILY OR) Take by mouth daily.         SOCIAL HISTORY:   Lifestyle  Factors affecting health:    Diet - Has done Elimination diet/Whole 30 again for 30days. Already avoids sugar and certain food combinations, minimal dairy.      Exercise - exercise bike, stretching 5x/wk. Not tired afterwards, will feel more energized.    Stress - fluctuates.  Sleep - ok, trying to get into bed earlier, averaging 7hours. Will wake at 3am and then goes back to sleep.    Teacher  Social History     Socioeconomic History    Marital status:    Tobacco Use    Smoking status: Never    Smokeless tobacco: Never   Vaping Use    Vaping Use: Never used   Substance and Sexual Activity    Alcohol use: Not Currently     Comment: Beer and Wine, Occasionally;     Drug use: No   Other Topics Concern    Caffeine Concern Yes     Comment: Soda, Coffee, 2 cups;     Exercise Yes     Comment: stretching and hep    History of tanning No    Pt has a pacemaker No    Pt has a defibrillator No    Reaction to local anesthetic No    Left Handed No    Right Handed Yes    Currently spends a great deal of time in the sun No    Hx of Spending Great Deal of Time in Sun Yes    Bad sunburns in the past Yes     Comment: once    Tanning Salons in the Past No    Hx of Radiation Treatments No   Social History Narrative    The patient does not use an assistive device..      The patient does live in a home with stairs.     Social Determinants of Health     Financial Resource Strain: Low Risk  (1/2/2024)    Financial Resource Strain     Difficulty of Paying Living Expenses: Not very hard     Med Affordability: No   Food Insecurity: No Food Insecurity (12/25/2023)    Food Insecurity     Food Insecurity: Never true   Transportation Needs: No Transportation Needs (1/2/2024)    Transportation Needs     Lack of Transportation: No   Housing Stability: Low Risk  (12/25/2023)    Housing Stability     Housing Instability: No       SURGICAL HISTORY:     Past Surgical History:   Procedure Laterality Date    Arthroscopy of joint unlisted      Knee  surgery      Shanda biopsy stereo nodule 1 site right (cpt=19081)  01/05/2017    benign    Other surgical history  2005    Brain and neck    Other surgical history Left 1983    Knee    Other surgical history  1985    Devated septum    Spinal surgery - dmg         PHYSICAL EXAM:     Vitals:    02/28/24 1535   BP: 98/60   Weight: 108 lb 6.4 oz (49.2 kg)   Height: 5' 4\" (1.626 m)       Physical Exam  Vitals reviewed.   Constitutional:       General: She is not in acute distress.     Appearance: Normal appearance.   HENT:      Mouth/Throat:      Mouth: Mucous membranes are moist.      Pharynx: Oropharynx is clear.   Eyes:      Extraocular Movements: Extraocular movements intact.      Conjunctiva/sclera: Conjunctivae normal.   Neck:      Thyroid: No thyromegaly.   Cardiovascular:      Rate and Rhythm: Normal rate and regular rhythm.      Pulses: Normal pulses.      Heart sounds: Normal heart sounds. No murmur heard.     No gallop.   Pulmonary:      Effort: Pulmonary effort is normal.      Breath sounds: Normal breath sounds. No wheezing or rales.   Musculoskeletal:         General: No swelling or deformity.   Skin:     General: Skin is warm and dry.   Neurological:      General: No focal deficit present.      Mental Status: She is alert and oriented to person, place, and time.      Deep Tendon Reflexes: Reflexes normal.   Psychiatric:         Mood and Affect: Mood normal.         Behavior: Behavior normal.         Thought Content: Thought content normal.         Judgment: Judgment normal.          ASSESSMENT AND PLAN:     Hospital Outpatient Visit on 02/19/2024   Component Date Value Ref Range Status    ISTAT Creatinine 02/19/2024 0.70  0.55 - 1.02 mg/dL Final    This test may exhibit falsely elevated results when a sample is collected from a patient who is presently taking Hydroxyurea. If patient is consuming Hydroxyurea, i-STAT creatinine analysis should be considered inaccurate and a sample should be referred to the  Central Lab for analysis, if clinically indicated.    eGFR-Cr 02/19/2024 101  >=60 mL/min/1.73m2 Final   [facility] Lab Encounter on 01/30/2024   Component Date Value Ref Range Status    Urine Color 01/30/2024 Yellow  Yellow Final    Clarity Urine 01/30/2024 Clear  Clear Final    Spec Gravity 01/30/2024 1.011  1.005 - 1.030 Final    Glucose Urine 01/30/2024 Normal  Normal mg/dL Final    Bilirubin Urine 01/30/2024 Negative  Negative Final    Ketones Urine 01/30/2024 Negative  Negative mg/dL Final    Blood Urine 01/30/2024 Negative  Negative Final    pH Urine 01/30/2024 6.5  5.0 - 8.0 Final    Protein Urine 01/30/2024 20 (A)  Negative mg/dL Final    Urobilinogen Urine 01/30/2024 Normal  Normal Final    Nitrite Urine 01/30/2024 Negative  Negative Final    Leukocyte Esterase Urine 01/30/2024 500 (A)  Negative Final      Sven/uL Interpretation  25          Trace  75          1+  250         2+  500         3+    WBC Urine 01/30/2024 >50 (A)  0 - 5 /HPF Final    RBC Urine 01/30/2024 0-2  0 - 2 /HPF Final    Bacteria Urine 01/30/2024 Rare (A)  None Seen /HPF Final    Squamous Epi. Cells 01/30/2024 Few (A)  None Seen /HPF Final    Renal Tubular Epithelial Cells 01/30/2024 None Seen  None Seen /HPF Final    Transitional Cells 01/30/2024 Few (A)  None Seen /HPF Final    Yeast Urine 01/30/2024 None Seen  None Seen /HPF Final    Urine Culture 01/30/2024 No Growth at 18-24 hrs.   Final   [facility] Lab Encounter on 01/20/2024   Component Date Value Ref Range Status    Urine Color 01/20/2024 Yellow  Yellow Final    Clarity Urine 01/20/2024 Ex.Turbid (A)  Clear Final    Spec Gravity 01/20/2024 1.021  1.005 - 1.030 Final    Glucose Urine 01/20/2024 Normal  Normal mg/dL Final    Bilirubin Urine 01/20/2024 Negative  Negative Final    Ketones Urine 01/20/2024 Trace (A)  Negative mg/dL Final    Blood Urine 01/20/2024 3+ (A)  Negative Final    pH Urine 01/20/2024 6.5  5.0 - 8.0 Final    Protein Urine 01/20/2024 300 (A)  Negative mg/dL Final     Urobilinogen Urine 01/20/2024 Normal  Normal Final    Nitrite Urine 01/20/2024 Negative  Negative Final    Leukocyte Esterase Urine 01/20/2024 500 (A)  Negative Final      Sven/uL Interpretation  25          Trace  75          1+  250         2+  500         3+    WBC Urine 01/20/2024 >50 (A)  0 - 5 /HPF Final    RBC Urine 01/20/2024 >10 (A)  0 - 2 /HPF Final    Bacteria Urine 01/20/2024 Rare (A)  None Seen /HPF Final    Squamous Epi. Cells 01/20/2024 Few (A)  None Seen /HPF Final    Renal Tubular Epithelial Cells 01/20/2024 None Seen  None Seen /HPF Final    Transitional Cells 01/20/2024 None Seen  None Seen /HPF Final    Yeast Urine 01/20/2024 None Seen  None Seen /HPF Final    Urine Culture 01/20/2024 >100,000 CFU/ML Escherichia coli (A)   Final   [facility] Lab Encounter on 01/08/2024   Component Date Value Ref Range Status    Glucose 01/08/2024 167 (H)  70 - 99 mg/dL Final    Sodium 01/08/2024 135 (L)  136 - 145 mmol/L Final    Potassium 01/08/2024 3.8  3.5 - 5.1 mmol/L Final    Chloride 01/08/2024 98  98 - 112 mmol/L Final    CO2 01/08/2024 31.0  21.0 - 32.0 mmol/L Final    Anion Gap 01/08/2024 6  0 - 18 mmol/L Final    BUN 01/08/2024 10  9 - 23 mg/dL Final    Creatinine 01/08/2024 0.82  0.55 - 1.02 mg/dL Final    BUN/CREA Ratio 01/08/2024 12.2  10.0 - 20.0 Final    Calcium, Total 01/08/2024 9.5  8.7 - 10.4 mg/dL Final    Calculated Osmolality 01/08/2024 283  275 - 295 mOsm/kg Final    eGFR-Cr 01/08/2024 83  >=60 mL/min/1.73m2 Final    Patient Fasting for BMP? 01/08/2024 No   Final   Admission on 01/05/2024, Discharged on 01/05/2024   Component Date Value Ref Range Status    Glucose 01/05/2024 103 (H)  70 - 99 mg/dL Final    Sodium 01/05/2024 129 (L)  136 - 145 mmol/L Final    Potassium 01/05/2024 3.4 (L)  3.5 - 5.1 mmol/L Final    Chloride 01/05/2024 94 (L)  98 - 112 mmol/L Final    CO2 01/05/2024 24.0  21.0 - 32.0 mmol/L Final    Anion Gap 01/05/2024 11  0 - 18 mmol/L Final    BUN 01/05/2024 <5 (L)  9 - 23 mg/dL  Final    Creatinine 01/05/2024 0.73  0.55 - 1.02 mg/dL Final    Calcium, Total 01/05/2024 8.8  8.7 - 10.4 mg/dL Final    eGFR-Cr 01/05/2024 96  >=60 mL/min/1.73m2 Final    ALT 01/05/2024 18  10 - 49 U/L Final    AST 01/05/2024 34  <=34 U/L Final    Alkaline Phosphatase 01/05/2024 62  46 - 118 U/L Final    Bilirubin, Total 01/05/2024 0.3  0.3 - 1.2 mg/dL Final    Total Protein 01/05/2024 7.3  5.7 - 8.2 g/dL Final    Albumin 01/05/2024 4.5  3.2 - 4.8 g/dL Final    Globulin  01/05/2024 2.8  2.8 - 4.4 g/dL Final    A/G Ratio 01/05/2024 1.6  1.0 - 2.0 Final    WBC 01/05/2024 5.9  4.0 - 11.0 x10(3) uL Final    RBC 01/05/2024 4.74  3.80 - 5.30 x10(6)uL Final    HGB 01/05/2024 14.5  12.0 - 16.0 g/dL Final    HCT 01/05/2024 41.8  35.0 - 48.0 % Final    MCV 01/05/2024 88.2  80.0 - 100.0 fL Final    MCH 01/05/2024 30.6  26.0 - 34.0 pg Final    MCHC 01/05/2024 34.7  31.0 - 37.0 g/dL Final    RDW-SD 01/05/2024 40.1  35.1 - 46.3 fL Final    RDW 01/05/2024 12.4  11.0 - 15.0 % Final    PLT 01/05/2024 252.0  150.0 - 450.0 10(3)uL Final    Neutrophil Absolute Prelim 01/05/2024 3.21  1.50 - 7.70 x10 (3) uL Final    Neutrophil Absolute 01/05/2024 3.21  1.50 - 7.70 x10(3) uL Final    Lymphocyte Absolute 01/05/2024 1.59  1.00 - 4.00 x10(3) uL Final    Monocyte Absolute 01/05/2024 0.96  0.10 - 1.00 x10(3) uL Final    Eosinophil Absolute 01/05/2024 0.08  0.00 - 0.70 x10(3) uL Final    Basophil Absolute 01/05/2024 0.03  0.00 - 0.20 x10(3) uL Final    Immature Granulocyte Absolute 01/05/2024 0.03  0.00 - 1.00 x10(3) uL Final    Neutrophil % 01/05/2024 54.4  % Final    Lymphocyte % 01/05/2024 26.9  % Final    Monocyte % 01/05/2024 16.3  % Final    Eosinophil % 01/05/2024 1.4  % Final    Basophil % 01/05/2024 0.5  % Final    Immature Granulocyte % 01/05/2024 0.5  % Final    SARS-CoV-2 (COVID-19) - ([employer]ID[facility]) 01/05/2024 Not Detected  Not Detected Final    Influenza A by PCR 01/05/2024 Positive (A)  Negative Final    Influenza B by PCR  01/05/2024 Negative  Negative Final    RSV by PCR 01/05/2024 Negative  Negative Final   Admission on 12/25/2023, Discharged on 12/29/2023   Component Date Value Ref Range Status    Glucose 12/25/2023 103 (H)  70 - 99 mg/dL Final    Sodium 12/25/2023 136  136 - 145 mmol/L Final    Potassium 12/25/2023 3.9  3.5 - 5.1 mmol/L Final    Chloride 12/25/2023 100  98 - 112 mmol/L Final    CO2 12/25/2023 28.0  21.0 - 32.0 mmol/L Final    Anion Gap 12/25/2023 8  0 - 18 mmol/L Final    BUN 12/25/2023 8 (L)  9 - 23 mg/dL Final    Creatinine 12/25/2023 0.69  0.55 - 1.02 mg/dL Final    BUN/CREA Ratio 12/25/2023 11.6  10.0 - 20.0 Final    Calcium, Total 12/25/2023 9.3  8.7 - 10.4 mg/dL Final    Calculated Osmolality 12/25/2023 281  275 - 295 mOsm/kg Final    eGFR-Cr 12/25/2023 101  >=60 mL/min/1.73m2 Final    ALT 12/25/2023 30  10 - 49 U/L Final    AST 12/25/2023 45 (H)  <=34 U/L Final    Alkaline Phosphatase 12/25/2023 77  46 - 118 U/L Final    Bilirubin, Total 12/25/2023 1.1  0.3 - 1.2 mg/dL Final    Total Protein 12/25/2023 7.3  5.7 - 8.2 g/dL Final    Albumin 12/25/2023 4.7  3.2 - 4.8 g/dL Final    Globulin  12/25/2023 2.6 (L)  2.8 - 4.4 g/dL Final    A/G Ratio 12/25/2023 1.8  1.0 - 2.0 Final    Lipase 12/25/2023 28  13 - 75 U/L Final    WBC 12/25/2023 12.5 (H)  4.0 - 11.0 x10(3) uL Final    RBC 12/25/2023 4.15  3.80 - 5.30 x10(6)uL Final    HGB 12/25/2023 12.7  12.0 - 16.0 g/dL Final    HCT 12/25/2023 39.2  35.0 - 48.0 % Final    MCV 12/25/2023 94.5  80.0 - 100.0 fL Final    MCH 12/25/2023 30.6  26.0 - 34.0 pg Final    MCHC 12/25/2023 32.4  31.0 - 37.0 g/dL Final    RDW-SD 12/25/2023 48.4 (H)  35.1 - 46.3 fL Final    RDW 12/25/2023 13.8  11.0 - 15.0 % Final    PLT 12/25/2023 231.0  150.0 - 450.0 10(3)uL Final    Neutrophil Absolute Prelim 12/25/2023 8.89 (H)  1.50 - 7.70 x10 (3) uL Final    Neutrophil Absolute 12/25/2023 8.89 (H)  1.50 - 7.70 x10(3) uL Final    Lymphocyte Absolute 12/25/2023 1.93  1.00 - 4.00 x10(3) uL Final     Monocyte Absolute 12/25/2023 1.56 (H)  0.10 - 1.00 x10(3) uL Final    Eosinophil Absolute 12/25/2023 0.05  0.00 - 0.70 x10(3) uL Final    Basophil Absolute 12/25/2023 0.03  0.00 - 0.20 x10(3) uL Final    Immature Granulocyte Absolute 12/25/2023 0.04  0.00 - 1.00 x10(3) uL Final    Neutrophil % 12/25/2023 71.2  % Final    Lymphocyte % 12/25/2023 15.4  % Final    Monocyte % 12/25/2023 12.5  % Final    Eosinophil % 12/25/2023 0.4  % Final    Basophil % 12/25/2023 0.2  % Final    Immature Granulocyte % 12/25/2023 0.3  % Final    Glucose 12/26/2023 128 (H)  70 - 99 mg/dL Final    Sodium 12/26/2023 137  136 - 145 mmol/L Final    Potassium 12/26/2023 4.3  3.5 - 5.1 mmol/L Final    Chloride 12/26/2023 104  98 - 112 mmol/L Final    CO2 12/26/2023 30.0  21.0 - 32.0 mmol/L Final    Anion Gap 12/26/2023 3  0 - 18 mmol/L Final    BUN 12/26/2023 8 (L)  9 - 23 mg/dL Final    Creatinine 12/26/2023 0.80  0.55 - 1.02 mg/dL Final    BUN/CREA Ratio 12/26/2023 10.0  10.0 - 20.0 Final    Calcium, Total 12/26/2023 8.6 (L)  8.7 - 10.4 mg/dL Final    Calculated Osmolality 12/26/2023 284  275 - 295 mOsm/kg Final    eGFR-Cr 12/26/2023 86  >=60 mL/min/1.73m2 Final    WBC 12/26/2023 7.0  4.0 - 11.0 x10(3) uL Final    RBC 12/26/2023 3.78 (L)  3.80 - 5.30 x10(6)uL Final    HGB 12/26/2023 11.8 (L)  12.0 - 16.0 g/dL Final    HCT 12/26/2023 35.5  35.0 - 48.0 % Final    MCV 12/26/2023 93.9  80.0 - 100.0 fL Final    MCH 12/26/2023 31.2  26.0 - 34.0 pg Final    MCHC 12/26/2023 33.2  31.0 - 37.0 g/dL Final    RDW-SD 12/26/2023 46.7 (H)  35.1 - 46.3 fL Final    RDW 12/26/2023 13.7  11.0 - 15.0 % Final    PLT 12/26/2023 177.0  150.0 - 450.0 10(3)uL Final    Neutrophil Absolute Prelim 12/26/2023 4.40  1.50 - 7.70 x10 (3) uL Final    Neutrophil Absolute 12/26/2023 4.40  1.50 - 7.70 x10(3) uL Final    Lymphocyte Absolute 12/26/2023 1.51  1.00 - 4.00 x10(3) uL Final    Monocyte Absolute 12/26/2023 0.88  0.10 - 1.00 x10(3) uL Final    Eosinophil Absolute  12/26/2023 0.14  0.00 - 0.70 x10(3) uL Final    Basophil Absolute 12/26/2023 0.01  0.00 - 0.20 x10(3) uL Final    Immature Granulocyte Absolute 12/26/2023 0.02  0.00 - 1.00 x10(3) uL Final    Neutrophil % 12/26/2023 63.3  % Final    Lymphocyte % 12/26/2023 21.7  % Final    Monocyte % 12/26/2023 12.6  % Final    Eosinophil % 12/26/2023 2.0  % Final    Basophil % 12/26/2023 0.1  % Final    Immature Granulocyte % 12/26/2023 0.3  % Final    Glucose 12/27/2023 101 (H)  70 - 99 mg/dL Final    Sodium 12/27/2023 137  136 - 145 mmol/L Final    Potassium 12/27/2023 4.2  3.5 - 5.1 mmol/L Final    Chloride 12/27/2023 103  98 - 112 mmol/L Final    CO2 12/27/2023 30.0  21.0 - 32.0 mmol/L Final    Anion Gap 12/27/2023 4  0 - 18 mmol/L Final    BUN 12/27/2023 7 (L)  9 - 23 mg/dL Final    Creatinine 12/27/2023 0.76  0.55 - 1.02 mg/dL Final    BUN/CREA Ratio 12/27/2023 9.2 (L)  10.0 - 20.0 Final    Calcium, Total 12/27/2023 9.0  8.7 - 10.4 mg/dL Final    Calculated Osmolality 12/27/2023 282  275 - 295 mOsm/kg Final    eGFR-Cr 12/27/2023 91  >=60 mL/min/1.73m2 Final    WBC 12/27/2023 5.5  4.0 - 11.0 x10(3) uL Final    RBC 12/27/2023 3.93  3.80 - 5.30 x10(6)uL Final    HGB 12/27/2023 12.3  12.0 - 16.0 g/dL Final    HCT 12/27/2023 36.6  35.0 - 48.0 % Final    MCV 12/27/2023 93.1  80.0 - 100.0 fL Final    MCH 12/27/2023 31.3  26.0 - 34.0 pg Final    MCHC 12/27/2023 33.6  31.0 - 37.0 g/dL Final    RDW-SD 12/27/2023 45.1  35.1 - 46.3 fL Final    RDW 12/27/2023 13.2  11.0 - 15.0 % Final    PLT 12/27/2023 198.0  150.0 - 450.0 10(3)uL Final    Neutrophil Absolute Prelim 12/27/2023 2.94  1.50 - 7.70 x10 (3) uL Final    Neutrophil Absolute 12/27/2023 2.94  1.50 - 7.70 x10(3) uL Final    Lymphocyte Absolute 12/27/2023 1.67  1.00 - 4.00 x10(3) uL Final    Monocyte Absolute 12/27/2023 0.62  0.10 - 1.00 x10(3) uL Final    Eosinophil Absolute 12/27/2023 0.25  0.00 - 0.70 x10(3) uL Final    Basophil Absolute 12/27/2023 0.02  0.00 - 0.20 x10(3) uL  Final    Immature Granulocyte Absolute 12/27/2023 0.02  0.00 - 1.00 x10(3) uL Final    Neutrophil % 12/27/2023 53.2  % Final    Lymphocyte % 12/27/2023 30.3  % Final    Monocyte % 12/27/2023 11.2  % Final    Eosinophil % 12/27/2023 4.5  % Final    Basophil % 12/27/2023 0.4  % Final    Immature Granulocyte % 12/27/2023 0.4  % Final    Glucose 12/28/2023 98  70 - 99 mg/dL Final    Sodium 12/28/2023 135 (L)  136 - 145 mmol/L Final    Potassium 12/28/2023 4.3  3.5 - 5.1 mmol/L Final    Chloride 12/28/2023 105  98 - 112 mmol/L Final    CO2 12/28/2023 25.0  21.0 - 32.0 mmol/L Final    Anion Gap 12/28/2023 5  0 - 18 mmol/L Final    BUN 12/28/2023 <5 (L)  9 - 23 mg/dL Final    Creatinine 12/28/2023 0.76  0.55 - 1.02 mg/dL Final    Calcium, Total 12/28/2023 9.4  8.7 - 10.4 mg/dL Final    eGFR-Cr 12/28/2023 91  >=60 mL/min/1.73m2 Final    WBC 12/28/2023 5.5  4.0 - 11.0 x10(3) uL Final    RBC 12/28/2023 4.26  3.80 - 5.30 x10(6)uL Final    HGB 12/28/2023 13.1  12.0 - 16.0 g/dL Final    HCT 12/28/2023 39.7  35.0 - 48.0 % Final    MCV 12/28/2023 93.2  80.0 - 100.0 fL Final    MCH 12/28/2023 30.8  26.0 - 34.0 pg Final    MCHC 12/28/2023 33.0  31.0 - 37.0 g/dL Final    RDW-SD 12/28/2023 44.3  35.1 - 46.3 fL Final    RDW 12/28/2023 12.8  11.0 - 15.0 % Final    PLT 12/28/2023 252.0  150.0 - 450.0 10(3)uL Final    Neutrophil Absolute Prelim 12/28/2023 2.98  1.50 - 7.70 x10 (3) uL Final    Neutrophil Absolute 12/28/2023 2.98  1.50 - 7.70 x10(3) uL Final    Lymphocyte Absolute 12/28/2023 1.53  1.00 - 4.00 x10(3) uL Final    Monocyte Absolute 12/28/2023 0.69  0.10 - 1.00 x10(3) uL Final    Eosinophil Absolute 12/28/2023 0.27  0.00 - 0.70 x10(3) uL Final    Basophil Absolute 12/28/2023 0.03  0.00 - 0.20 x10(3) uL Final    Immature Granulocyte Absolute 12/28/2023 0.01  0.00 - 1.00 x10(3) uL Final    Neutrophil % 12/28/2023 54.1  % Final    Lymphocyte % 12/28/2023 27.8  % Final    Monocyte % 12/28/2023 12.5  % Final    Eosinophil %  12/28/2023 4.9  % Final    Basophil % 12/28/2023 0.5  % Final    Immature Granulocyte % 12/28/2023 0.2  % Final    Glucose 12/29/2023 99  70 - 99 mg/dL Final    Sodium 12/29/2023 134 (L)  136 - 145 mmol/L Final    Potassium 12/29/2023 3.9  3.5 - 5.1 mmol/L Final    Chloride 12/29/2023 104  98 - 112 mmol/L Final    CO2 12/29/2023 28.0  21.0 - 32.0 mmol/L Final    Anion Gap 12/29/2023 2  0 - 18 mmol/L Final    BUN 12/29/2023 6 (L)  9 - 23 mg/dL Final    Creatinine 12/29/2023 0.81  0.55 - 1.02 mg/dL Final    BUN/CREA Ratio 12/29/2023 7.4 (L)  10.0 - 20.0 Final    Calcium, Total 12/29/2023 9.5  8.7 - 10.4 mg/dL Final    Calculated Osmolality 12/29/2023 276  275 - 295 mOsm/kg Final    eGFR-Cr 12/29/2023 85  >=60 mL/min/1.73m2 Final    WBC 12/29/2023 4.9  4.0 - 11.0 x10(3) uL Final    RBC 12/29/2023 4.36  3.80 - 5.30 x10(6)uL Final    HGB 12/29/2023 13.5  12.0 - 16.0 g/dL Final    HCT 12/29/2023 40.4  35.0 - 48.0 % Final    MCV 12/29/2023 92.7  80.0 - 100.0 fL Final    MCH 12/29/2023 31.0  26.0 - 34.0 pg Final    MCHC 12/29/2023 33.4  31.0 - 37.0 g/dL Final    RDW-SD 12/29/2023 43.3  35.1 - 46.3 fL Final    RDW 12/29/2023 12.6  11.0 - 15.0 % Final    PLT 12/29/2023 275.0  150.0 - 450.0 10(3)uL Final    Neutrophil Absolute Prelim 12/29/2023 2.67  1.50 - 7.70 x10 (3) uL Final    Neutrophil Absolute 12/29/2023 2.67  1.50 - 7.70 x10(3) uL Final    Lymphocyte Absolute 12/29/2023 1.32  1.00 - 4.00 x10(3) uL Final    Monocyte Absolute 12/29/2023 0.61  0.10 - 1.00 x10(3) uL Final    Eosinophil Absolute 12/29/2023 0.26  0.00 - 0.70 x10(3) uL Final    Basophil Absolute 12/29/2023 0.03  0.00 - 0.20 x10(3) uL Final    Immature Granulocyte Absolute 12/29/2023 0.01  0.00 - 1.00 x10(3) uL Final    Neutrophil % 12/29/2023 54.6  % Final    Lymphocyte % 12/29/2023 26.9  % Final    Monocyte % 12/29/2023 12.4  % Final    Eosinophil % 12/29/2023 5.3  % Final    Basophil % 12/29/2023 0.6  % Final    Immature Granulocyte % 12/29/2023 0.2  %  Final   [facility] Lab Encounter on 12/15/2023   Component Date Value Ref Range Status    Free T4 12/15/2023 1.1  0.8 - 1.7 ng/dL Final    TSH 12/15/2023 3.346  0.550 - 4.780 mIU/mL Final    T3 Free 12/15/2023 2.82  2.40 - 4.20 pg/mL Final    Reverse T3 12/15/2023 12.4  9.2 - 24.1 ng/dL Final    Iron 12/15/2023 125  50 - 170 ug/dL Final    Transferrin 12/15/2023 271  250 - 380 mg/dL Final    Total Iron Binding Capacity 12/15/2023 404  250 - 425 ug/dL Final    % Saturation 12/15/2023 31  15 - 50 % Final    Glucose 12/15/2023 113 (H)  70 - 99 mg/dL Final    Sodium 12/15/2023 139  136 - 145 mmol/L Final    Potassium 12/15/2023 4.2  3.5 - 5.1 mmol/L Final    Chloride 12/15/2023 104  98 - 112 mmol/L Final    CO2 12/15/2023 29.0  21.0 - 32.0 mmol/L Final    Anion Gap 12/15/2023 6  0 - 18 mmol/L Final    BUN 12/15/2023 9  9 - 23 mg/dL Final    Creatinine 12/15/2023 0.77  0.55 - 1.02 mg/dL Final    BUN/CREA Ratio 12/15/2023 11.7  10.0 - 20.0 Final    Calcium, Total 12/15/2023 9.4  8.7 - 10.4 mg/dL Final    Calculated Osmolality 12/15/2023 287  275 - 295 mOsm/kg Final    eGFR-Cr 12/15/2023 90  >=60 mL/min/1.73m2 Final    ALT 12/15/2023 20  10 - 49 U/L Final    AST 12/15/2023 27  <=34 U/L Final    Alkaline Phosphatase 12/15/2023 71  46 - 118 U/L Final    Bilirubin, Total 12/15/2023 0.8  0.3 - 1.2 mg/dL Final    Total Protein 12/15/2023 6.9  5.7 - 8.2 g/dL Final    Albumin 12/15/2023 4.5  3.2 - 4.8 g/dL Final    Globulin  12/15/2023 2.4 (L)  2.8 - 4.4 g/dL Final    A/G Ratio 12/15/2023 1.9  1.0 - 2.0 Final    Patient Fasting for CMP? 12/15/2023 Yes   Final    Ferritin 12/15/2023 61.0  18.0 - 340.0 ng/mL Final    HgbA1C 12/15/2023 5.6  <5.7 % Final     Normal HbA1C:     <5.7%      Pre-Diabetic:     5.7 - 6.4%      Diabetic:         >6.4%      Diabetic Control: <7.0%        Estimated Average Glucose 12/15/2023 114  68 - 126 mg/dL Final    eAG is the estimated average glucose calculated from Hgb A1c according to the formula recommended  by the American Diabetes Association. eAG levels reflect the long term average glucose and may not correlate with random or fasting glucose levels since these represent specific points in time.           Insulin 12/15/2023 4.5  3.0 - 25.0 mU/L Final    WBC 12/15/2023 5.1  4.0 - 11.0 x10(3) uL Final    RBC 12/15/2023 4.48  3.80 - 5.30 x10(6)uL Final    HGB 12/15/2023 13.9  12.0 - 16.0 g/dL Final    HCT 12/15/2023 42.1  35.0 - 48.0 % Final    MCV 12/15/2023 94.0  80.0 - 100.0 fL Final    MCH 12/15/2023 31.0  26.0 - 34.0 pg Final    MCHC 12/15/2023 33.0  31.0 - 37.0 g/dL Final    RDW-SD 12/15/2023 47.8 (H)  35.1 - 46.3 fL Final    RDW 12/15/2023 13.8  11.0 - 15.0 % Final    PLT 12/15/2023 251.0  150.0 - 450.0 10(3)uL Final    Neutrophil Absolute Prelim 12/15/2023 2.50  1.50 - 7.70 x10 (3) uL Final    Neutrophil Absolute 12/15/2023 2.50  1.50 - 7.70 x10(3) uL Final    Lymphocyte Absolute 12/15/2023 1.79  1.00 - 4.00 x10(3) uL Final    Monocyte Absolute 12/15/2023 0.54  0.10 - 1.00 x10(3) uL Final    Eosinophil Absolute 12/15/2023 0.17  0.00 - 0.70 x10(3) uL Final    Basophil Absolute 12/15/2023 0.04  0.00 - 0.20 x10(3) uL Final    Immature Granulocyte Absolute 12/15/2023 0.01  0.00 - 1.00 x10(3) uL Final    Neutrophil % 12/15/2023 49.5  % Final    Lymphocyte % 12/15/2023 35.4  % Final    Monocyte % 12/15/2023 10.7  % Final    Eosinophil % 12/15/2023 3.4  % Final    Basophil % 12/15/2023 0.8  % Final    Immature Granulocyte % 12/15/2023 0.2  % Final    Vitamin D, 25OH, Total 12/15/2023 29.0 (L)  30.0 - 100.0 ng/mL Final    Literature Recommendations for 25(OH)D levels are:  Range           Vitamin D Status   <20    ng/mL      Deficiency   20-<30 ng/mL      Insufficiency    ng/mL      Sufficiency   >100   ng/mL      Toxicity    *Clinical controversy exists regarding optimal 25(OH)D levels. Emerging evidence links potential adverse effects to high levels, particularly >60 ng/mL.       Office Visit on 11/15/2023    Component Date Value Ref Range Status    HPV High Risk 11/15/2023 Negative  Negative Final    HPV Source 11/15/2023 Cervical/endocervical   Final    Interpretation/Result 11/15/2023 Negative for intraepithelial lesion or malignancy  Negative for intraepithelial lesion or malignancy Final    Other Findings 11/15/2023 Atrophic pattern.    Final    Specimen Adequacy 11/15/2023 Satisfactory for evaluation. Endocervical or metaplastic cells present   Final    General Categorization 11/15/2023 Negative for intraepithelial lesion or malignancy     Final    HPV High Risk mRNA 11/15/2023    Final                    Value:This result contains rich text formatting which cannot be displayed here.    Recommendations/Comments 11/15/2023    Final                    Value:This result contains rich text formatting which cannot be displayed here.    Procedure 11/15/2023    Final                    Value:This result contains rich text formatting which cannot be displayed here.    Clinical Information 11/15/2023    Final                    Value:This result contains rich text formatting which cannot be displayed here.    Reason for testing 11/15/2023 Screening   Final    Gyn Additional Information 11/15/2023    Final                    Value:This result contains rich text formatting which cannot be displayed here.    Case Report 11/15/2023    Final                    Value:Gynecologic Cytology                              Case: [id number]                                  Authorizing Provider:  Yamileth Serra CNM         Collected:           11/15/2023 06:36 PM          Ordering Location:     [facility]    Received:            11/16/2023 11:29 AM                                 [facility]                                                         First Screen:          Reggie Cabrera                                                                Rescreen:              Yovana Barton                                                               Specimen:    ThinPrep Imager Screening Pap, Cervical/endocervical                                      [facility], [facility] Encounter on 09/09/2023   Component Date Value Ref Range Status    TSH 09/09/2023 0.870  0.358 - 3.740 mIU/mL Final    This test may exhibit interference when a sample is collected from a person who is consuming high dose of biotin (a.k.a., vitamin B7, vitamin H, coenzyme R) supplements resulting in serum concentrations >100 ng/mL.  Intake of the recommended daily allowance (RDA) for biotin (0.03 mg) has not been shown to typically cause significant interference; however, high dose daily dietary supplements may contain biotin concentrations greater than 150 times (5-10 mg) the RDA.  It is recommended that physicians ask all patients who may be on biotin supplementation to stop biotin consumption at least 72 hours prior to collection of a new sample.      Free T4 09/09/2023 0.8  0.8 - 1.7 ng/dL Final    If applicable: Pregnancy Reference Intervals  First trimester 10-13 weeks gestation    0.9-1.4 ng/dL  Second trimester 14-26 weeks gestation   0.7-1.3 ng/dL      This test may exhibit interference when a sample is collected from a person who is consuming high dose of biotin (a.k.a., vitamin B7, vitamin H, coenzyme R) supplements resulting in serum concentrations >100 ng/mL.  Intake of the recommended daily allowance (RDA) for biotin (0.03 mg) has not been shown to typically cause significant interference; however, high dose daily dietary supplements may contain biotin concentrations greater than 150 times (5-10 mg) the RDA.  It is recommended that physicians ask all patients who may be on biotin supplementation to stop biotin consumption at least 72 hours prior to collection of a new sample.      T3 Free 09/09/2023 3.22  2.40 - 4.20 pg/mL Final    This test may exhibit interference  when a sample is collected from a person who is consuming high dose of biotin (a.k.a., vitamin B7, vitamin H, coenzyme R) supplements resulting in serum concentrations >100 ng/mL.  Intake of the recommended daily allowance (RDA) for biotin (0.03 mg) has not been shown to typically cause significant interference; however, high dose daily dietary supplements may contain biotin concentrations greater than 150 times (5-10 mg) the RDA.  It is recommended that physicians ask all patients who may be on biotin supplementation to stop biotin consumption at least 72 hours prior to collection of a new sample.      Reverse T3 09/09/2023 13.2  9.2 - 24.1 ng/dL Final    Candida IgG Ab 09/09/2023 Negative  Negative Final    Candida IgM Ab IgM 09/09/2023 Negative  Negative Final                  **Please note reference interval change**    Candida IgA Ab 09/09/2023 Negative  Negative Final                  **Please note reference interval change**      [facility] MAGDALENA 2D+3D SCREENING BILAT (CPT=77067/71294)    Result Date: 2/23/2024  CONCLUSION:   No mammographic evidence of malignancy  BI-RADS CATEGORY:   DIAGNOSTIC CATEGORY 2--BENIGN FINDING:   RECOMMENDATIONS:  ROUTINE MAMMOGRAM AND CLINICAL EVALUATION IN 12 MONTHS.       PLEASE NOTE: NORMAL MAMMOGRAM DOES NOT EXCLUDE THE POSSIBILITY OF BREAST CANCER.  A CLINICALLY SUSPICIOUS PALPABLE LUMP SHOULD BE BIOPSIED.   For patients over the age of 40, the target due date for the patient's next mammogram has been entered into a reminder system.   Patient received a discharge summary from the technologist after completion of exam.  Breast marker legend used on images  Triangle = Palpable lump [place] = Skin tag or mole BB = Nipple Linear brenda = Scar Square = Pain    Dictated by (CST): Brendan Mcintosh MD on 2/23/2024 at 11:34 AM     Finalized by (CST): Brendan Mcintosh MD on 2/23/2024 at 11:36 AM          CT ABDOMEN+PELVIS(CONTRAST ONLY)(CPT=74177)    Result Date: 2/19/2024  CONCLUSION:  1.  Uncomplicated diverticulosis of the ascending and sigmoid colon.  Findings of perforated diverticulitis previously seen about the hepatic flexure on prior CT studies have resolved. 2. Lesser incidental findings as above.    Dictated by (CST): Boni Mcclure MD on 2/19/2024 at 12:40 PM     Finalized by (CST): Boni Mcclure MD on 2/19/2024 at 12:45 PM          CT ABDOMEN+PELVIS(CONTRAST ONLY)(CPT=74177)    Result Date: 12/28/2023  CONCLUSION:   Acute diverticulitis of the hepatic flexure has significantly improved but not entirely resolved since the prior exams.  Intussusception within the left upper quadrant involving loops of jejunum is new since the prior exam.  This is likely incidental.  No obstruction.  No extraluminal collection or extraluminal contrast.  No abscess.  If not recently performed, following resolution of patient's symptoms a colonoscopy should be performed to exclude an underlying mass.  Pandiverticulosis.  Multiple other incidental findings as described in the body of the report.      Dictated by (CST): Gagandeep Lara MD on 12/28/2023 at 12:42 PM     Finalized by (CST): Gagandeep Lara MD on 12/28/2023 at 12:47 PM          CT ABDOMEN PELVIS IV CONTRAST, NO ORAL (ER)    Result Date: 12/25/2023  CONCLUSION:  Perforated colitis involving ascending colon near the hepatic flexure, likely due to perforated diverticulitis.  Phlegmonous collection predominantly of fat stranding with some air adjacent to the hepatic flexure measuring 6.2 centimeters.  There appears  to be a tract extending from this collection to colon at the hepatic flexure.  Extensive ascending colon diverticula.  Normal appendix.  Small amount of fluid tracking adjacent to the gallbladder is favored to be reactive, related to inflammatory findings in the right upper quadrant related to colonic perforation.  Findings were discussed with Arlyn Alvarado  on 12/25/2023 at 3:42 p.m.  Dictated by (CST): Yoly Ventura MD on 12/25/2023 at  3:28 PM     Finalized by (CST): Yoly Ventura MD on 12/25/2023 at 3:45 PM           1. Hypothyroidism, unspecified type  - Assay, Thyroid Stim Hormone; Future  - Free T4, (Free Thyroxine); Future  - Free T3 (Triiodothryronine); Future  - Reverse T3, Serum; Future    2. Low vitamin D level  - Vitamin D; Future    3. Cold intolerance    4. Constipation, unspecified constipation type        Time spent with patient: Over 30 minutes spent in chart review and in direct communication with patient obtaining and reviewing history, creating a unique care plan, explaining the rationale for treatment, reviewing potential SE and overall treatment plan,  documenting all clinical information in Epic. Over 50% of this time was in education, counseling and coordination of care.     Problem List Items Addressed This Visit    None  Visit Diagnoses       Hypothyroidism, unspecified type    -  Primary    Relevant Orders    Assay, Thyroid Stim Hormone    Free T4, (Free Thyroxine)    Free T3 (Triiodothryronine)    Reverse T3, Serum    Low vitamin D level        Relevant Orders    Vitamin D    Cold intolerance        Constipation, unspecified constipation type              Endocrine:  Hypothyroid-Continue Hickman Thyroid 45 mg daily. (Dispensed as three 15 mg tablets since insurance does not cover 30mg tabs).  Here in Sig, which has now been corrected.  Patient remains symptomatically stable now that she is back on the 45 mg daily.  She will research which pharmacy would be best to use moving forward and will let us know before she is due for the refill.  -> Recheck levels in 3 months.    Prediabetes-elevated hemoglobin A1c in 2022 labs, 5.5 in December labs.    GI: Dysbiosis and constipation has improved.  Now bloating occurs only with larger meals, so is eating smaller more frequent meals.  Follow-up abdominal CT with GE showed healing of the perforation, and uncomplicated diverticulosis.  Normal Colonoscopy and Endoscopy in Aug  2022.    Low vitamin D    Given further recommendations as below    Orders Placed This Visit:  Orders Placed This Encounter   Procedures    Assay, Thyroid Stim Hormone    Free T4, (Free Thyroxine)    Free T3 (Triiodothryronine)    Reverse T3, Serum    Vitamin D     No orders of the defined types were placed in this encounter.      Patient Instructions   [place] Thyroid Medication instructions changed. Let us know which pharmacy you would like us to send the refill to when it is time.     Return in about 6 months (around 8/28/2024), or 30min, for thyroid.    Patient affirmed understanding of plan and all questions were answered.     Meggan Yañez PA-C

## 2024-03-13 ENCOUNTER — PATIENT MESSAGE (OUTPATIENT)
Facility: CLINIC | Age: 59
End: 2024-03-13

## 2024-03-14 ENCOUNTER — TELEPHONE (OUTPATIENT)
Facility: CLINIC | Age: 59
End: 2024-03-14

## 2024-03-14 NOTE — TELEPHONE ENCOUNTER
From: Tricia Danielson  To: Augie Dennison  Sent: 3/13/2024  4:50 PM CDT  Subject: Follow Up from CT Scan     Hi Dr. Dennison,     I am following up from my CT scan in February. I was unsure if I was suppose to schedule a follow up appointment or what the next steps are.     Thank you,  Tricia

## 2024-03-27 ENCOUNTER — HOSPITAL ENCOUNTER (OUTPATIENT)
Dept: BONE DENSITY | Age: 59
Discharge: HOME | End: 2024-03-27
Attending: ADVANCED PRACTICE MIDWIFE
Payer: COMMERCIAL

## 2024-03-27 DIAGNOSIS — Z78.0 ENCOUNTER FOR OSTEOPOROSIS SCREENING IN ASYMPTOMATIC POSTMENOPAUSAL PATIENT: ICD-10-CM

## 2024-03-27 DIAGNOSIS — Z13.820 ENCOUNTER FOR OSTEOPOROSIS SCREENING IN ASYMPTOMATIC POSTMENOPAUSAL PATIENT: ICD-10-CM

## 2024-03-27 PROCEDURE — 77080 DXA BONE DENSITY AXIAL: CPT | Performed by: ADVANCED PRACTICE MIDWIFE

## 2024-04-30 ENCOUNTER — OFFICE VISIT (OUTPATIENT)
Dept: INTERNAL MEDICINE CLINIC | Facility: CLINIC | Age: 59
End: 2024-04-30

## 2024-04-30 VITALS
HEIGHT: 64 IN | SYSTOLIC BLOOD PRESSURE: 132 MMHG | BODY MASS INDEX: 18.95 KG/M2 | WEIGHT: 111 LBS | DIASTOLIC BLOOD PRESSURE: 73 MMHG | HEART RATE: 61 BPM

## 2024-04-30 DIAGNOSIS — Z00.00 PHYSICAL EXAM: Primary | ICD-10-CM

## 2024-04-30 PROCEDURE — 99396 PREV VISIT EST AGE 40-64: CPT | Performed by: INTERNAL MEDICINE

## 2024-04-30 PROCEDURE — 3008F BODY MASS INDEX DOCD: CPT | Performed by: INTERNAL MEDICINE

## 2024-04-30 PROCEDURE — 3078F DIAST BP <80 MM HG: CPT | Performed by: INTERNAL MEDICINE

## 2024-04-30 PROCEDURE — 3075F SYST BP GE 130 - 139MM HG: CPT | Performed by: INTERNAL MEDICINE

## 2024-04-30 RX ORDER — HYOSCYAMINE SULFATE 0.38 MG/1
0.38 TABLET, EXTENDED RELEASE ORAL EVERY 12 HOURS PRN
Qty: 30 TABLET | Refills: 2 | Status: SHIPPED | OUTPATIENT
Start: 2024-04-30

## 2024-05-01 ENCOUNTER — TELEPHONE (OUTPATIENT)
Dept: INTERNAL MEDICINE CLINIC | Facility: CLINIC | Age: 59
End: 2024-05-01

## 2024-05-01 NOTE — TELEPHONE ENCOUNTER
Prior authorization initiated through cover my meds for Hyoscyamine ER 0.375 mg tablet.  Key: [id number]

## 2024-05-02 ENCOUNTER — HOSPITAL ENCOUNTER (OUTPATIENT)
Dept: ULTRASOUND IMAGING | Facility: HOSPITAL | Age: 59
Discharge: HOME | End: 2024-05-02
Attending: ADVANCED PRACTICE MIDWIFE
Payer: COMMERCIAL

## 2024-05-02 DIAGNOSIS — R92.30 DENSE BREAST TISSUE ON MAMMOGRAM, UNSPECIFIED TYPE: ICD-10-CM

## 2024-05-02 PROCEDURE — 76641 ULTRASOUND BREAST COMPLETE: CPT | Performed by: ADVANCED PRACTICE MIDWIFE

## 2024-05-06 PROBLEM — K57.20 PERFORATED DIVERTICULUM OF LARGE INTESTINE: Status: RESOLVED | Noted: 2023-12-25 | Resolved: 2024-05-06

## 2024-05-06 NOTE — PROGRESS NOTES
HPI:    Patient ID: Tricia Danielson is a 59 year old female.    HPI    Physical exam  Generally healthy    /73 (BP Location: Right arm, Patient Position: Sitting, Cuff Size: adult)   Pulse 61   Ht 5' 4\" (1.626 m)   Wt 111 lb (50.3 kg)   BMI 19.05 kg/m²   Wt Readings from Last 6 Encounters:   04/30/24 111 lb (50.3 kg)   02/28/24 108 lb 6.4 oz (49.2 kg)   01/29/24 107 lb (48.5 kg)   01/08/24 105 lb 6.4 oz (47.8 kg)   01/04/24 107 lb (48.5 kg)   01/03/24 108 lb 3.2 oz (49.1 kg)     Body mass index is 19.05 kg/m².  HGBA1C:    Lab Results   Component Value Date    A1C 5.6 12/15/2023    A1C 5.9 (H) 11/08/2022    A1C 5.8 (H) 08/03/2021     12/15/2023         Review of Systems   Constitutional:  Negative for activity change, chills, fatigue and fever.   HENT:  Negative for ear discharge, nosebleeds, postnasal drip, rhinorrhea, sinus pressure and sore throat.    Eyes:  Negative for pain, discharge and redness.   Respiratory:  Negative for cough, chest tightness, shortness of breath and wheezing.    Cardiovascular:  Negative for chest pain, palpitations and leg swelling.   Gastrointestinal:  Negative for abdominal pain, blood in stool, constipation, diarrhea, nausea and vomiting.   Genitourinary:  Negative for difficulty urinating, dysuria, frequency, hematuria and urgency.   Musculoskeletal:  Negative for back pain, gait problem and joint swelling.   Skin:  Negative for rash.   Neurological:  Negative for syncope, weakness, light-headedness and headaches.   Psychiatric/Behavioral:  Negative for dysphoric mood. The patient is not nervous/anxious.          Current Outpatient Medications   Medication Sig Dispense Refill    Cetirizine HCl (ZYRTEC OR) Take by mouth.      hyoscyamine ER 0.375 MG Oral Tablet 12 Hr Take 1 tablet (0.375 mg total) by mouth every 12 (twelve) hours as needed for Cramping. 30 tablet 2    Turmeric (QC TUMERIC COMPLEX OR) Take 1 tablet by mouth daily.      Multiple Vitamins-Minerals  (MULTI-VITAMIN/MINERALS) Oral Tab Take 1 tablet by mouth daily.      thyroid (ARMOUR THYROID) 15 MG Oral Tab Take 3 tablets (45 mg total) by mouth daily. Take 1 tablet 30-60min before food and other meds/supplements 270 tablet 1    Probiotic Product (PROBIOTIC DAILY OR) Take by mouth daily.       Allergies:No Known Allergies    HISTORY:  Past Medical History:    Allergic rhinitis    Allergy    Back problem    C2-3 mild central, C4-5 left mild foraminal, C5-6 right mod foraminal & left mild-mod, C6-7 mild-mod diffuse bulging discs    C3-4 small central, C4-5 small central herniated discs    C5-6 right mod foraminal stenosis    Constipation    Diverticular disease    Family history of basal cell carcinoma    History of atypical nevus    Hyperglycemia    L3-4 grade 1 spondylolisthesis    L3-4 mild-mod diffuse bulging disc    L4-5 mild-mod central bulging disc with annular tear    L4-5 mild-mod central stenosis    L5-S1 left mild-mod, L3-4 left mild foraminal stenosis    L5-S1 left paracentral mild-mod herniated disc    left C7 radiculitis    left L5-S1 radiculitis    Seizure disorder (HCC)    Seizures (HCC)    Stroke (HCC)    Thyroid disorder      Past Surgical History:   Procedure Laterality Date    Arthroscopy of joint unlisted      Knee surgery      Shanda biopsy stereo nodule 1 site right (cpt=19081)  01/05/2017    benign    Other surgical history  2005    Brain and neck    Other surgical history Left 1983    Knee    Other surgical history  1985    Devated septum    Spinal surgery - dmg        Family History   Problem Relation Age of Onset    Cancer Father         Stomach cancer    Hypertension Mother     Cancer Mother       Social History:   Social History     Socioeconomic History    Marital status:    Tobacco Use    Smoking status: Never     Passive exposure: Never    Smokeless tobacco: Never   Vaping Use    Vaping status: Never Used   Substance and Sexual Activity    Alcohol use: Not Currently     Comment:  Beer and Wine, Occasionally;     Drug use: No   Other Topics Concern    Caffeine Concern Yes     Comment: Soda, Coffee, 2 cups;     Exercise Yes     Comment: stretching and hep    History of tanning No    Pt has a pacemaker No    Pt has a defibrillator No    Reaction to local anesthetic No    Left Handed No    Right Handed Yes    Currently spends a great deal of time in the sun No    Hx of Spending Great Deal of Time in Sun Yes    Bad sunburns in the past Yes     Comment: once    Tanning Salons in the Past No    Hx of Radiation Treatments No   Social History Narrative    The patient does not use an assistive device..      The patient does live in a home with stairs.     Social Determinants of Health     Financial Resource Strain: Low Risk  (1/2/2024)    Financial Resource Strain     Difficulty of Paying Living Expenses: Not very hard     Med Affordability: No   Food Insecurity: No Food Insecurity (12/25/2023)    Food Insecurity     Food Insecurity: Never true   Transportation Needs: No Transportation Needs (1/2/2024)    Transportation Needs     Lack of Transportation: No   Housing Stability: Low Risk  (12/25/2023)    Housing Stability     Housing Instability: No        PHYSICAL EXAM:    Physical Exam  Constitutional:       General: She is not in acute distress.     Appearance: She is well-developed. She is not diaphoretic.   HENT:      Head: Normocephalic and atraumatic.      Right Ear: Ear canal normal.      Left Ear: Ear canal normal.      Nose: Nose normal.      Mouth/Throat:      Pharynx: No oropharyngeal exudate or posterior oropharyngeal erythema.   Eyes:      General: No scleral icterus.        Right eye: No discharge.         Left eye: No discharge.      Conjunctiva/sclera: Conjunctivae normal.      Pupils: Pupils are equal, round, and reactive to light.   Cardiovascular:      Rate and Rhythm: Normal rate and regular rhythm.      Heart sounds: Normal heart sounds. No murmur heard.  Pulmonary:      Effort:  Pulmonary effort is normal. No respiratory distress.      Breath sounds: Normal breath sounds. No wheezing.   Abdominal:      General: Bowel sounds are normal.      Palpations: Abdomen is soft. There is no mass.      Tenderness: There is no abdominal tenderness. There is no guarding or rebound.      Hernia: No hernia is present.   Musculoskeletal:         General: No tenderness.   Skin:     General: Skin is warm and dry.      Findings: No lesion or rash.   Neurological:      Mental Status: She is alert.      Gait: Gait normal.   Psychiatric:         Behavior: Behavior normal.         Thought Content: Thought content normal.              ASSESSMENT/PLAN:   (Z00.00) Physical exam  (primary encounter diagnosis)  Plan: CBC With Differential With Platelet, Comp         Metabolic Panel (14), Lipid Panel, Hemoglobin         A1C, Urinalysis, Routine  Generally healthy  Health screening colonoscopy mammogram and pap smear advised  Chronic medical problems are stable    Patient voiced understanding  and agrees with plan]         Orders Placed This Encounter   Procedures    CBC With Differential With Platelet    Comp Metabolic Panel (14)    Lipid Panel    Hemoglobin A1C    Urinalysis, Routine       Meds This Visit:  Requested Prescriptions     Signed Prescriptions Disp Refills    hyoscyamine ER 0.375 MG Oral Tablet 12 Hr 30 tablet 2     Sig: Take 1 tablet (0.375 mg total) by mouth every 12 (twelve) hours as needed for Cramping.       Imaging & Referrals:  None        ID#[id number]

## 2024-05-07 NOTE — TELEPHONE ENCOUNTER
Redid the prior authorization since the chart note was not completed previously.  Key: [id number])  The standard fax determination will also be sent to you directly. If you have any questions about your PA submission, please contact PAYMILL at [phone].

## 2024-05-08 ENCOUNTER — TELEPHONE (OUTPATIENT)
Dept: OBGYN CLINIC | Facility: CLINIC | Age: 59
End: 2024-05-08

## 2024-05-08 RX ORDER — ESTRADIOL 0.1 MG/G
CREAM VAGINAL
Qty: 42.5 G | Refills: 0 | Status: SHIPPED | OUTPATIENT
Start: 2024-05-08

## 2024-05-08 NOTE — TELEPHONE ENCOUNTER
Name and  verified    Patient states she completed breast ultrasound. Noted 5/2 ultrasound-   Impression   CONCLUSION:   No sonographic abnormality is seen in either breast.  Patient may return to annual screening mammogram.     BI-RADS CATEGORY:  DIAGNOSTIC CATEGORY 1--NEGATIVE ASSESSMENT.       RECOMMENDATIONS:  ROUTINE MAMMOGRAM AND CLINICAL EVALUATION IN 9 MONTHS.            Patient also requesting a refill on estradiol 0.1 MG/GM Vaginal Cream . Order pended for signature if appropriate.    Patient arrived to PCU room 2097 via wheelchair. Patient was hooked up to monitor and assessed. Patient is alert and oriented x4 at this time. RN will continue to monitor.

## 2024-05-08 NOTE — TELEPHONE ENCOUNTER
----- Message from Yamileth Serra sent at 5/7/2024  3:00 PM CDT -----  Please notify pt of normal mammogram. She does have dense breasts which   reduces mammographic sensitivity for breast cancer. Screening whole breast ultrasound or MRI, used as an adjunct to mammography, can detect breast cancers that might be obscured by dense breast tissue on mammography. See if she is interested in whole breast ultrasound or MRI. If so please place order. She should check her insurance coverage first

## 2024-05-10 NOTE — TELEPHONE ENCOUNTER
Prior authorization has been denied, the product requested is not covered under the pharmacy benefit because it is not approved by the FDA.  Please advise

## 2024-07-01 RX ORDER — THYROID,PORK 15 MG
TABLET ORAL
Qty: 270 TABLET | Refills: 0 | Status: SHIPPED | OUTPATIENT
Start: 2024-07-01

## 2024-07-01 NOTE — TELEPHONE ENCOUNTER
A refill request was received for:  Requested Prescriptions     Pending Prescriptions Disp Refills    HARVEY THYROID 15 MG Oral Tab [Pharmacy Med Name: [facility] Thyroid 15 Mg Tab Chucky] 270 tablet 0     Sig: TAKE THREE TABLETS BY MOUTH DAILY **TAKE ONE TABLET 30-60 MINUTES BEFORE FOOD SND OTHER MEDICINE/SUPPLEMENTS     Last refill date:  10/11/23   Qty: 270 and 1   Dx: hypothyroidism  Last office visit:  2/28/24   When is follow up due: 8/28/24       Future Appointments   Date Time Provider Department Center   8/28/2024  4:30 PM Meggan Yañez, [facility]

## 2024-07-19 ENCOUNTER — LAB ENCOUNTER (OUTPATIENT)
Dept: LAB | Age: 59
End: 2024-07-19
Attending: PHYSICIAN ASSISTANT
Payer: COMMERCIAL

## 2024-07-19 DIAGNOSIS — E03.9 HYPOTHYROIDISM, UNSPECIFIED TYPE: ICD-10-CM

## 2024-07-19 DIAGNOSIS — R79.89 LOW VITAMIN D LEVEL: ICD-10-CM

## 2024-07-19 LAB
T3FREE SERPL-MCNC: 3.38 PG/ML (ref 2.4–4.2)
T4 FREE SERPL-MCNC: 0.9 NG/DL (ref 0.8–1.7)
TSI SER-ACNC: 1.17 MIU/ML (ref 0.55–4.78)
VIT D+METAB SERPL-MCNC: 27 NG/ML (ref 30–100)

## 2024-07-19 PROCEDURE — 84482 T3 REVERSE: CPT

## 2024-07-19 PROCEDURE — 82306 VITAMIN D 25 HYDROXY: CPT

## 2024-07-19 PROCEDURE — 84439 ASSAY OF FREE THYROXINE: CPT

## 2024-07-19 PROCEDURE — 84481 FREE ASSAY (FT-3): CPT

## 2024-07-19 PROCEDURE — 84443 ASSAY THYROID STIM HORMONE: CPT

## 2024-07-19 PROCEDURE — 36415 COLL VENOUS BLD VENIPUNCTURE: CPT

## 2024-07-24 LAB — REVERSE T3: 13.5 NG/DL

## 2024-07-30 ENCOUNTER — LAB ENCOUNTER (OUTPATIENT)
Dept: LAB | Age: 59
End: 2024-07-30
Attending: INTERNAL MEDICINE
Payer: COMMERCIAL

## 2024-07-30 DIAGNOSIS — Z00.00 PHYSICAL EXAM: ICD-10-CM

## 2024-07-30 LAB
ALBUMIN SERPL-MCNC: 4.4 G/DL (ref 3.2–4.8)
ALBUMIN/GLOB SERPL: 1.7 {RATIO} (ref 1–2)
ALP LIVER SERPL-CCNC: 71 U/L
ALT SERPL-CCNC: 21 U/L
ANION GAP SERPL CALC-SCNC: 7 MMOL/L (ref 0–18)
AST SERPL-CCNC: 27 U/L (ref ?–34)
BASOPHILS # BLD AUTO: 0.04 X10(3) UL (ref 0–0.2)
BASOPHILS NFR BLD AUTO: 0.7 %
BILIRUB SERPL-MCNC: 0.6 MG/DL (ref 0.3–1.2)
BILIRUB UR QL: NEGATIVE
BUN BLD-MCNC: 13 MG/DL (ref 9–23)
BUN/CREAT SERPL: 14.8 (ref 10–20)
CALCIUM BLD-MCNC: 9.5 MG/DL (ref 8.7–10.4)
CHLORIDE SERPL-SCNC: 105 MMOL/L (ref 98–112)
CHOLEST SERPL-MCNC: 213 MG/DL (ref ?–200)
CLARITY UR: CLEAR
CO2 SERPL-SCNC: 29 MMOL/L (ref 21–32)
CREAT BLD-MCNC: 0.88 MG/DL
DEPRECATED RDW RBC AUTO: 50.2 FL (ref 35.1–46.3)
EGFRCR SERPLBLD CKD-EPI 2021: 76 ML/MIN/1.73M2 (ref 60–?)
EOSINOPHIL # BLD AUTO: 0.3 X10(3) UL (ref 0–0.7)
EOSINOPHIL NFR BLD AUTO: 5 %
ERYTHROCYTE [DISTWIDTH] IN BLOOD BY AUTOMATED COUNT: 14.4 % (ref 11–15)
EST. AVERAGE GLUCOSE BLD GHB EST-MCNC: 111 MG/DL (ref 68–126)
FASTING PATIENT LIPID ANSWER: YES
FASTING STATUS PATIENT QL REPORTED: YES
GLOBULIN PLAS-MCNC: 2.6 G/DL (ref 2–3.5)
GLUCOSE BLD-MCNC: 100 MG/DL (ref 70–99)
GLUCOSE UR-MCNC: NORMAL MG/DL
HBA1C MFR BLD: 5.5 % (ref ?–5.7)
HCT VFR BLD AUTO: 44.1 %
HDLC SERPL-MCNC: 121 MG/DL (ref 40–59)
HGB BLD-MCNC: 14.6 G/DL
HGB UR QL STRIP.AUTO: NEGATIVE
IMM GRANULOCYTES # BLD AUTO: 0.01 X10(3) UL (ref 0–1)
IMM GRANULOCYTES NFR BLD: 0.2 %
KETONES UR-MCNC: NEGATIVE MG/DL
LDLC SERPL CALC-MCNC: 83 MG/DL (ref ?–100)
LEUKOCYTE ESTERASE UR QL STRIP.AUTO: 500
LYMPHOCYTES # BLD AUTO: 2.29 X10(3) UL (ref 1–4)
LYMPHOCYTES NFR BLD AUTO: 38.2 %
MCH RBC QN AUTO: 31.7 PG (ref 26–34)
MCHC RBC AUTO-ENTMCNC: 33.1 G/DL (ref 31–37)
MCV RBC AUTO: 95.7 FL
MONOCYTES # BLD AUTO: 0.79 X10(3) UL (ref 0.1–1)
MONOCYTES NFR BLD AUTO: 13.2 %
NEUTROPHILS # BLD AUTO: 2.57 X10 (3) UL (ref 1.5–7.7)
NEUTROPHILS # BLD AUTO: 2.57 X10(3) UL (ref 1.5–7.7)
NEUTROPHILS NFR BLD AUTO: 42.7 %
NITRITE UR QL STRIP.AUTO: NEGATIVE
NONHDLC SERPL-MCNC: 92 MG/DL (ref ?–130)
OSMOLALITY SERPL CALC.SUM OF ELEC: 292 MOSM/KG (ref 275–295)
PH UR: 6.5 [PH] (ref 5–8)
PLATELET # BLD AUTO: 232 10(3)UL (ref 150–450)
POTASSIUM SERPL-SCNC: 3.6 MMOL/L (ref 3.5–5.1)
PROT SERPL-MCNC: 7 G/DL (ref 5.7–8.2)
PROT UR-MCNC: NEGATIVE MG/DL
RBC # BLD AUTO: 4.61 X10(6)UL
SODIUM SERPL-SCNC: 141 MMOL/L (ref 136–145)
SP GR UR STRIP: 1.01 (ref 1–1.03)
TRIGL SERPL-MCNC: 46 MG/DL (ref 30–149)
UROBILINOGEN UR STRIP-ACNC: NORMAL
VLDLC SERPL CALC-MCNC: 7 MG/DL (ref 0–30)
WBC # BLD AUTO: 6 X10(3) UL (ref 4–11)

## 2024-07-30 PROCEDURE — 83036 HEMOGLOBIN GLYCOSYLATED A1C: CPT

## 2024-07-30 PROCEDURE — 36415 COLL VENOUS BLD VENIPUNCTURE: CPT

## 2024-07-30 PROCEDURE — 81001 URINALYSIS AUTO W/SCOPE: CPT

## 2024-07-30 PROCEDURE — 80053 COMPREHEN METABOLIC PANEL: CPT

## 2024-07-30 PROCEDURE — 85025 COMPLETE CBC W/AUTO DIFF WBC: CPT

## 2024-07-30 PROCEDURE — 80061 LIPID PANEL: CPT

## 2024-07-31 ENCOUNTER — LAB ENCOUNTER (OUTPATIENT)
Dept: LAB | Age: 59
End: 2024-07-31
Attending: INTERNAL MEDICINE
Payer: COMMERCIAL

## 2024-07-31 PROCEDURE — 81003 URINALYSIS AUTO W/O SCOPE: CPT | Performed by: INTERNAL MEDICINE

## 2024-08-28 ENCOUNTER — OFFICE VISIT (OUTPATIENT)
Dept: INTEGRATIVE MEDICINE | Facility: CLINIC | Age: 59
End: 2024-08-28
Payer: COMMERCIAL

## 2024-08-28 VITALS
WEIGHT: 111.38 LBS | HEART RATE: 70 BPM | HEIGHT: 64 IN | BODY MASS INDEX: 19.01 KG/M2 | SYSTOLIC BLOOD PRESSURE: 110 MMHG | OXYGEN SATURATION: 97 % | DIASTOLIC BLOOD PRESSURE: 66 MMHG

## 2024-08-28 DIAGNOSIS — R68.89 COLD INTOLERANCE: ICD-10-CM

## 2024-08-28 DIAGNOSIS — M81.8 OTHER OSTEOPOROSIS, UNSPECIFIED PATHOLOGICAL FRACTURE PRESENCE: ICD-10-CM

## 2024-08-28 DIAGNOSIS — L67.8 BRITTLE HAIR: ICD-10-CM

## 2024-08-28 DIAGNOSIS — R53.83 OTHER FATIGUE: ICD-10-CM

## 2024-08-28 DIAGNOSIS — E61.1 LOW IRON: ICD-10-CM

## 2024-08-28 DIAGNOSIS — E55.9 VITAMIN D DEFICIENCY: ICD-10-CM

## 2024-08-28 DIAGNOSIS — E03.9 HYPOTHYROIDISM, UNSPECIFIED TYPE: Primary | ICD-10-CM

## 2024-08-28 PROCEDURE — G2211 COMPLEX E/M VISIT ADD ON: HCPCS | Performed by: PHYSICIAN ASSISTANT

## 2024-08-28 PROCEDURE — 3078F DIAST BP <80 MM HG: CPT | Performed by: PHYSICIAN ASSISTANT

## 2024-08-28 PROCEDURE — 99214 OFFICE O/P EST MOD 30 MIN: CPT | Performed by: PHYSICIAN ASSISTANT

## 2024-08-28 PROCEDURE — 3008F BODY MASS INDEX DOCD: CPT | Performed by: PHYSICIAN ASSISTANT

## 2024-08-28 PROCEDURE — 3074F SYST BP LT 130 MM HG: CPT | Performed by: PHYSICIAN ASSISTANT

## 2024-08-28 RX ORDER — THYROID 15 MG/1
60 TABLET ORAL DAILY
Qty: 120 TABLET | Refills: 1 | Status: SHIPPED | OUTPATIENT
Start: 2024-08-28

## 2024-08-28 NOTE — PROGRESS NOTES
Tricia Danielson is a 59 year old female.  Chief Complaint   Patient presents with    Follow - Up     Thyroid       HPI:   60yo female with known h/o hypothyroidism presents for f/u.  Last labs 7/30/24.    Hypothyroid - [facility] Thyroid 45 mg daily  still with cold intolerance, brittle hair.   Still dealing with a lot of fatigue despite more sleep and exercise this summer.     Constipation improved with Metamucil in the AM.   Bloating only with certain foods only.     Denies any h/o mono.     DEXA (from PCP) showed osteoporosis/osteopenia.   Does do weight resistance exercises. Limits upper body weights due to prior laminectomy.   Vit D has been low - not taking    --------------------------------------------Last OV Feb 2024 -----------------------    Last labs 12/15/23.     S/P perforated diverticulitis December -symptomatically resolved. Has started to eat more, but noticed she felt better with smaller amounts of food. Otherwise feels sluggish and weighted down. Still trying to do small amounts instead of larger meals.   GE had her start the metamucil every morning. Helps with her being shantel regular.   Had f/u abdominal CT and   H/o Dysbiosis     Now at goal weight.    Hypothyroid - was taking only 15mg instead of 45mg daily due to error in Sig. She did notice she was more fatigued and cold with lower dose.   Some brain fog, fatigue still remains.    Some improvement in constipation, fatigue, brittle hair, and cold intolerance, and no longer feeling \"puffy all over.\"   Bloating much improved.     Saw Midwife at [facility] for Pap and she recommended she start on the Estradiol vaginal cream due to atrophic tissue -has not started yet.  Patient denies frequent UTIs, dyspareunia.    Currently on the Probiotic Culturelle, MitoCore (x2/d), Turmeric.     H/o Raynaud's (17-19yo)      REVIEW OF SYSTEMS:   Review of Systems   Constitutional:  Positive for fatigue. Negative for chills and fever.   HENT: Negative.     Eyes: Negative.   Negative for visual disturbance.   Respiratory: Negative.  Negative for cough, shortness of breath and wheezing.    Cardiovascular: Negative.  Negative for chest pain and palpitations.   Gastrointestinal:  Positive for constipation. Negative for abdominal distention, abdominal pain and diarrhea.   Endocrine: Positive for cold intolerance.   Genitourinary: Negative.    Musculoskeletal: Negative.    Skin: Negative.    Allergic/Immunologic: Negative.    Neurological: Negative.  Negative for weakness and headaches.   Hematological: Negative.    Psychiatric/Behavioral: Negative.  Negative for sleep disturbance.             FAMILY HISTORY:      Family History   Problem Relation Age of Onset    Cancer Father         Stomach cancer    Hypertension Mother     Cancer Mother      Father passed in 40s from gastric cancer  MEDICAL HISTORY:     Past Medical History:    Allergic rhinitis    Allergy    Back problem    C2-3 mild central, C4-5 left mild foraminal, C5-6 right mod foraminal & left mild-mod, C6-7 mild-mod diffuse bulging discs    C3-4 small central, C4-5 small central herniated discs    C5-6 right mod foraminal stenosis    Constipation    Diverticular disease    Family history of basal cell carcinoma    History of atypical nevus    Hyperglycemia    L3-4 grade 1 spondylolisthesis    L3-4 mild-mod diffuse bulging disc    L4-5 mild-mod central bulging disc with annular tear    L4-5 mild-mod central stenosis    L5-S1 left mild-mod, L3-4 left mild foraminal stenosis    L5-S1 left paracentral mild-mod herniated disc    left C7 radiculitis    left L5-S1 radiculitis    Seizure disorder (HCC)    Seizures (HCC)    Stroke (HCC)    Thyroid disorder       CURRENT MEDICATIONS:     Current Outpatient Medications   Medication Sig Dispense Refill    thyroid (ARMOUR THYROID) 15 MG Oral Tab Take 4 tablets (60 mg total) by mouth daily. 120 tablet 1    estradiol 0.1 MG/GM Vaginal Cream Place 0.5 g vaginally daily twice weekly 42.5 g 0     Turmeric (QC TUMERIC COMPLEX OR) Take 1 tablet by mouth daily.      Multiple Vitamins-Minerals (MULTI-VITAMIN/MINERALS) Oral Tab Take 1 tablet by mouth daily.      Probiotic Product (PROBIOTIC DAILY OR) Take by mouth daily.      dicyclomine 10 MG Oral Cap Take 1 capsule (10 mg total) by mouth 2 (two) times daily as needed. (Patient not taking: Reported on 8/28/2024) 30 capsule 3    Cetirizine HCl (ZYRTEC OR) Take by mouth. (Patient not taking: Reported on 8/28/2024)         SOCIAL HISTORY:   Lifestyle Factors affecting health:    Diet -  Already avoids sugar and certain food combinations, minimal dairy.    Has done Elimination diet/Whole 30.    Exercise - exercise bike, stretching 5x/wk. Not tired afterwards, will feel more energized.    Stress - fluctuates.  Sleep - ok, trying to get into bed earlier, averaging 7hours. Will wake at 3am and then goes back to sleep.    Teacher  Social History     Socioeconomic History    Marital status:    Tobacco Use    Smoking status: Never     Passive exposure: Never    Smokeless tobacco: Never   Vaping Use    Vaping status: Never Used   Substance and Sexual Activity    Alcohol use: Not Currently     Comment: Beer and Wine, Occasionally;     Drug use: No   Other Topics Concern    Caffeine Concern Yes     Comment: Soda, Coffee, 2 cups;     Exercise Yes     Comment: stretching and hep    History of tanning No    Pt has a pacemaker No    Pt has a defibrillator No    Reaction to local anesthetic No    Left Handed No    Right Handed Yes    Currently spends a great deal of time in the sun No    Hx of Spending Great Deal of Time in Sun Yes    Bad sunburns in the past Yes     Comment: once    Tanning Salons in the Past No    Hx of Radiation Treatments No   Social History Narrative    The patient does not use an assistive device..      The patient does live in a home with stairs.     Social Determinants of Health     Financial Resource Strain: Low Risk  (1/2/2024)    Financial  Resource Strain     Difficulty of Paying Living Expenses: Not very hard     Med Affordability: No   Food Insecurity: No Food Insecurity (12/25/2023)    Food Insecurity     Food Insecurity: Never true   Transportation Needs: No Transportation Needs (1/2/2024)    Transportation Needs     Lack of Transportation: No   Housing Stability: Low Risk  (12/25/2023)    Housing Stability     Housing Instability: No       SURGICAL HISTORY:     Past Surgical History:   Procedure Laterality Date    Arthroscopy of joint unlisted      Knee surgery      Shanda biopsy stereo nodule 1 site right (cpt=19081)  01/05/2017    benign    Other surgical history  2005    Brain and neck    Other surgical history Left 1983    Knee    Other surgical history  1985    Devated septum    Spinal surgery - dmg         PHYSICAL EXAM:     Vitals:    08/28/24 1637   BP: 110/66   BP Location: Right arm   Patient Position: Sitting   Cuff Size: adult   Pulse: 70   SpO2: 97%   Weight: 111 lb 6.4 oz (50.5 kg)   Height: 5' 4\" (1.626 m)       Physical Exam  Vitals reviewed.   Constitutional:       General: She is not in acute distress.     Appearance: Normal appearance.   HENT:      Mouth/Throat:      Mouth: Mucous membranes are moist.      Pharynx: Oropharynx is clear.   Eyes:      Extraocular Movements: Extraocular movements intact.      Conjunctiva/sclera: Conjunctivae normal.   Neck:      Thyroid: No thyromegaly.   Cardiovascular:      Rate and Rhythm: Normal rate and regular rhythm.      Pulses: Normal pulses.      Heart sounds: Normal heart sounds. No murmur heard.     No gallop.   Pulmonary:      Effort: Pulmonary effort is normal.      Breath sounds: Normal breath sounds. No wheezing or rales.   Musculoskeletal:         General: No swelling or deformity.   Skin:     General: Skin is warm and dry.   Neurological:      General: No focal deficit present.      Mental Status: She is alert and oriented to person, place, and time.      Deep Tendon Reflexes:  Reflexes normal.   Psychiatric:         Mood and Affect: Mood normal.         Behavior: Behavior normal.         Thought Content: Thought content normal.         Judgment: Judgment normal.          ASSESSMENT AND PLAN:     Orders Only on 07/30/2024   Component Date Value Ref Range Status    Urine Color 07/31/2024 Light-Yellow  Yellow Final    Clarity Urine 07/31/2024 Clear  Clear Final    Spec Gravity 07/31/2024 1.006  1.005 - 1.030 Final    Glucose Urine 07/31/2024 Normal  Normal mg/dL Final    Bilirubin Urine 07/31/2024 Negative  Negative Final    Ketones Urine 07/31/2024 Negative  Negative mg/dL Final    Blood Urine 07/31/2024 Negative  Negative Final    pH Urine 07/31/2024 7.0  5.0 - 8.0 Final    Protein Urine 07/31/2024 Negative  Negative mg/dL Final    Urobilinogen Urine 07/31/2024 Normal  Normal Final    Nitrite Urine 07/31/2024 Negative  Negative Final    Leukocyte Esterase Urine 07/31/2024 Negative  Negative Final    Microscopic 07/31/2024 Microscopic not indicated   Final   [facility] Lab Encounter on 07/30/2024   Component Date Value Ref Range Status    Glucose 07/30/2024 100 (H)  70 - 99 mg/dL Final    Sodium 07/30/2024 141  136 - 145 mmol/L Final    Potassium 07/30/2024 3.6  3.5 - 5.1 mmol/L Final    Chloride 07/30/2024 105  98 - 112 mmol/L Final    CO2 07/30/2024 29.0  21.0 - 32.0 mmol/L Final    Anion Gap 07/30/2024 7  0 - 18 mmol/L Final    BUN 07/30/2024 13  9 - 23 mg/dL Final    Creatinine 07/30/2024 0.88  0.55 - 1.02 mg/dL Final    BUN/CREA Ratio 07/30/2024 14.8  10.0 - 20.0 Final    Calcium, Total 07/30/2024 9.5  8.7 - 10.4 mg/dL Final    Calculated Osmolality 07/30/2024 292  275 - 295 mOsm/kg Final    eGFR-Cr 07/30/2024 76  >=60 mL/min/1.73m2 Final    ALT 07/30/2024 21  10 - 49 U/L Final    AST 07/30/2024 27  <34 U/L Final    Alkaline Phosphatase 07/30/2024 71  46 - 118 U/L Final    Bilirubin, Total 07/30/2024 0.6  0.3 - 1.2 mg/dL Final    Total Protein 07/30/2024 7.0  5.7 - 8.2 g/dL Final    Albumin  07/30/2024 4.4  3.2 - 4.8 g/dL Final    Globulin  07/30/2024 2.6  2.0 - 3.5 g/dL Final    A/G Ratio 07/30/2024 1.7  1.0 - 2.0 Final    Patient Fasting for CMP? 07/30/2024 Yes   Final    Cholesterol, Total 07/30/2024 213 (H)  <200 mg/dL Final    Desirable  <200 mg/dL  Borderline  200-239 mg/dL  High      >=240 mg/dL        HDL Cholesterol 07/30/2024 121 (H)  40 - 59 mg/dL Final    Interpretive Information:   An HDL cholesterol <40 mg/dL is low and constitutes a coronary heart disease risk factor. An HDL cholesterol >60 mg/dL is a negative risk factor for coronary heart disease.        Triglycerides 07/30/2024 46  30 - 149 mg/dL Final    Reference interval for fasting triglycerides  Desirable: <150 mg/dL  Borderline: 150-199 mg/dL  High: 200-499 mg/dL  Very High: >=500 mg/dL          LDL Cholesterol 07/30/2024 83  <100 mg/dL Final    Optimal            <100 mg/dL   Near/Above OptimaL 100-129 mg/dL   Borderline High    130-159 mg/dL   High               160-189 mg/dL    Very High          >190 mg/dL         VLDL 07/30/2024 7  0 - 30 mg/dL Final    Non HDL Chol 07/30/2024 92  <130 mg/dL Final    Desirable  <130 mg/dL   Borderline  130-159 mg/dL   High        160-189 mg/dL       Very high >=190 mg/dL        Patient Fasting for Lipid? 07/30/2024 Yes   Final    HgbA1C 07/30/2024 5.5  <5.7 % Final     Normal HbA1C:     <5.7%      Pre-Diabetic:     5.7 - 6.4%      Diabetic:         >6.4%      Diabetic Control: <7.0%        Estimated Average Glucose 07/30/2024 111  68 - 126 mg/dL Final    eAG is the estimated average glucose calculated from Hgb A1c according to the formula recommended by the American Diabetes Association. eAG levels reflect the long term average glucose and may not correlate with random or fasting glucose levels since these represent specific points in time.           Urine Color 07/30/2024 Light-Yellow  Yellow Final    Clarity Urine 07/30/2024 Clear  Clear Final    Spec Gravity 07/30/2024 1.015  1.005 -  1.030 Final    Glucose Urine 07/30/2024 Normal  Normal mg/dL Final    Bilirubin Urine 07/30/2024 Negative  Negative Final    Ketones Urine 07/30/2024 Negative  Negative mg/dL Final    Blood Urine 07/30/2024 Negative  Negative Final    pH Urine 07/30/2024 6.5  5.0 - 8.0 Final    Protein Urine 07/30/2024 Negative  Negative mg/dL Final    Urobilinogen Urine 07/30/2024 Normal  Normal Final    Nitrite Urine 07/30/2024 Negative  Negative Final    Leukocyte Esterase Urine 07/30/2024 500 (A)  Negative Final      Sven/uL Interpretation  25          Trace  75          1+  250         2+  500         3+    WBC Urine 07/30/2024 21-50 (A)  0 - 5 /HPF Final    RBC Urine 07/30/2024 0-2  0 - 2 /HPF Final    Bacteria Urine 07/30/2024 Rare (A)  None Seen /HPF Final    Squamous Epi. Cells 07/30/2024 Few (A)  None Seen /HPF Final    Renal Tubular Epithelial Cells 07/30/2024 None Seen  None Seen /HPF Final    Transitional Cells 07/30/2024 Few (A)  None Seen /HPF Final    Yeast Urine 07/30/2024 None Seen  None Seen /HPF Final    WBC 07/30/2024 6.0  4.0 - 11.0 x10(3) uL Final    RBC 07/30/2024 4.61  3.80 - 5.30 x10(6)uL Final    HGB 07/30/2024 14.6  12.0 - 16.0 g/dL Final    HCT 07/30/2024 44.1  35.0 - 48.0 % Final    MCV 07/30/2024 95.7  80.0 - 100.0 fL Final    MCH 07/30/2024 31.7  26.0 - 34.0 pg Final    MCHC 07/30/2024 33.1  31.0 - 37.0 g/dL Final    RDW-SD 07/30/2024 50.2 (H)  35.1 - 46.3 fL Final    RDW 07/30/2024 14.4  11.0 - 15.0 % Final    PLT 07/30/2024 232.0  150.0 - 450.0 10(3)uL Final    Neutrophil Absolute Prelim 07/30/2024 2.57  1.50 - 7.70 x10 (3) uL Final    Neutrophil Absolute 07/30/2024 2.57  1.50 - 7.70 x10(3) uL Final    Lymphocyte Absolute 07/30/2024 2.29  1.00 - 4.00 x10(3) uL Final    Monocyte Absolute 07/30/2024 0.79  0.10 - 1.00 x10(3) uL Final    Eosinophil Absolute 07/30/2024 0.30  0.00 - 0.70 x10(3) uL Final    Basophil Absolute 07/30/2024 0.04  0.00 - 0.20 x10(3) uL Final    Immature Granulocyte Absolute  07/30/2024 0.01  0.00 - 1.00 x10(3) uL Final    Neutrophil % 07/30/2024 42.7  % Final    Lymphocyte % 07/30/2024 38.2  % Final    Monocyte % 07/30/2024 13.2  % Final    Eosinophil % 07/30/2024 5.0  % Final    Basophil % 07/30/2024 0.7  % Final    Immature Granulocyte % 07/30/2024 0.2  % Final   [facility] Lab Encounter on 07/19/2024   Component Date Value Ref Range Status    TSH 07/19/2024 1.165  0.550 - 4.780 mIU/mL Final    Free T4 07/19/2024 0.9  0.8 - 1.7 ng/dL Final    T3 Free 07/19/2024 3.38  2.40 - 4.20 pg/mL Final    Reverse T3 07/19/2024 13.5  9.2 - 24.1 ng/dL Final    Vitamin D, 25OH, Total 07/19/2024 27.0 (L)  30.0 - 100.0 ng/mL Final    Literature Recommendations for 25(OH)D levels are:  Range           Vitamin D Status   <20    ng/mL      Deficiency   20-<30 ng/mL      Insufficiency    ng/mL      Sufficiency   >100   ng/mL      Toxicity    *Clinical controversy exists regarding optimal 25(OH)D levels. Emerging evidence links potential adverse effects to high levels, particularly >60 ng/mL.          No results found.    1. Hypothyroidism, unspecified type  - Assay, Thyroid Stim Hormone; Future  - Free T4, (Free Thyroxine); Future  - Free T3 (Triiodothryronine); Future  - Reverse T3, Serum; Future    2. Vitamin D deficiency  - Vitamin D; Future    3. Cold intolerance    4. Other fatigue  - Vitamin D; Future  - Iron And Tibc; Future  - CBC With Differential With Platelet; Future  - Assay, Thyroid Stim Hormone; Future  - Free T4, (Free Thyroxine); Future  - Free T3 (Triiodothryronine); Future  - Reverse T3, Serum; Future  - Ferritin; Future    5. Brittle hair    6. Low iron  - Iron And Tibc; Future  - CBC With Differential With Platelet; Future  - Ferritin; Future    7. Other osteoporosis, unspecified pathological fracture presence          Time spent with patient: Over 30 minutes spent in chart review and in direct communication with patient obtaining and reviewing history, creating a unique care plan,  explaining the rationale for treatment, reviewing potential SE and overall treatment plan,  documenting all clinical information in Epic. Over 50% of this time was in education, counseling and coordination of care.     Problem List Items Addressed This Visit    None  Visit Diagnoses       Hypothyroidism, unspecified type    -  Primary    Relevant Medications    thyroid (ARMOUR THYROID) 15 MG Oral Tab    Other Relevant Orders    Assay, Thyroid Stim Hormone    Free T4, (Free Thyroxine)    Free T3 (Triiodothryronine)    Reverse T3, Serum    Vitamin D deficiency        Relevant Medications    thyroid (ARMOUR THYROID) 15 MG Oral Tab    Other Relevant Orders    Vitamin D    Cold intolerance        Other fatigue        Relevant Orders    Vitamin D    Iron And Tibc    CBC With Differential With Platelet    Assay, Thyroid Stim Hormone    Free T4, (Free Thyroxine)    Free T3 (Triiodothryronine)    Reverse T3, Serum    Ferritin    Brittle hair        Low iron        Relevant Orders    Iron And Tibc    CBC With Differential With Platelet    Ferritin    Other osteoporosis, unspecified pathological fracture presence        Relevant Medications    thyroid (ARMOUR THYROID) 15 MG Oral Tab            Endocrine:  Hypothyroid-Free T4 was low normal and patient is exhibiting underactive thyroid symptoms despite lifestyle changes and supplements.  Discussed and patient agrees to increasing by 15 mg every other day to start, then 2 every day with a total of 60 mg daily.  Overactive symptoms reviewed with patient.  -> Recheck levels in 4 weeks    GI: Dysbiosis and constipation has improved.    Recommend colostrum daily as below.    Reviewed DEXA scan at patient's request -did show osteoporosis and osteopenia.  Discussed ways she could increase bone strength as well as a possible evaluation with [facility]  Sample of Fosteum plus given.    Low vitamin D -recheck    Given further recommendations as below    Orders Placed This Visit:  Orders  Placed This Encounter   Procedures    Vitamin D    Iron And Tibc    CBC With Differential With Platelet    Assay, Thyroid Stim Hormone    Free T4, (Free Thyroxine)    Free T3 (Triiodothryronine)    Reverse T3, Serum    Ferritin     No orders of the defined types were placed in this encounter.      Patient Instructions   Get labs drawn in the morning. Hold the thyroid med until after lab draw. Hold any biotin (hair supplement) 72hours prior.     2. Hair/Skin/Nails Ultra by Pure Encapsulations    Recommended Dosage: take 1 capsule twice daily     3. Colostrum-IgG™ (Caps) by Qoniac    Premier bovine colostrum with immunoglobulins for effective immune support*  > Colostrum is a special fluid secreted by a cow for about 3 days after giving birth  > Highly nutritious with naturally occurring amino acids, lactoferrin and immunoglobulins  > From grass-fed cows  Colostrum-IgG™ features bovine colostrum with a whole host of naturally occurring immunoglobulins which effectively support immune health.  In addition, this concentrate delivers a full spectrum of multiple immune support agents, including naturally occurring amino acids, lactoferrin and immunoglobulins.    Take 1 capsule, 2 times daily    4. Fosteum Plus Samples      No follow-ups on file.    Patient affirmed understanding of plan and all questions were answered.     Meggan Yañez PA-C

## 2024-08-28 NOTE — PATIENT INSTRUCTIONS
Get labs drawn in the morning. Hold the thyroid med until after lab draw. Hold any biotin (hair supplement) 72hours prior.     2. Hair/Skin/Nails Ultra by Pure Encapsulations    Recommended Dosage: take 1 capsule twice daily     3. Colostrum-IgG™ (Caps) by Gramovox    Premier bovine colostrum with immunoglobulins for effective immune support*  > Colostrum is a special fluid secreted by a cow for about 3 days after giving birth  > Highly nutritious with naturally occurring amino acids, lactoferrin and immunoglobulins  > From grass-fed cows  Colostrum-IgG™ features bovine colostrum with a whole host of naturally occurring immunoglobulins which effectively support immune health.  In addition, this concentrate delivers a full spectrum of multiple immune support agents, including naturally occurring amino acids, lactoferrin and immunoglobulins.    Take 1 capsule, 2 times daily    4. Fosteum Plus Samples

## 2024-09-28 ENCOUNTER — LAB ENCOUNTER (OUTPATIENT)
Dept: LAB | Age: 59
End: 2024-09-28
Attending: PHYSICIAN ASSISTANT
Payer: COMMERCIAL

## 2024-09-28 DIAGNOSIS — E55.9 VITAMIN D DEFICIENCY: ICD-10-CM

## 2024-09-28 DIAGNOSIS — E03.9 HYPOTHYROIDISM, UNSPECIFIED TYPE: ICD-10-CM

## 2024-09-28 DIAGNOSIS — E61.1 LOW IRON: ICD-10-CM

## 2024-09-28 DIAGNOSIS — R53.83 OTHER FATIGUE: ICD-10-CM

## 2024-09-28 LAB
BASOPHILS # BLD AUTO: 0.04 X10(3) UL (ref 0–0.2)
BASOPHILS NFR BLD AUTO: 0.6 %
DEPRECATED HBV CORE AB SER IA-ACNC: 43 NG/ML
DEPRECATED RDW RBC AUTO: 46.6 FL (ref 35.1–46.3)
EOSINOPHIL # BLD AUTO: 0.2 X10(3) UL (ref 0–0.7)
EOSINOPHIL NFR BLD AUTO: 3.1 %
ERYTHROCYTE [DISTWIDTH] IN BLOOD BY AUTOMATED COUNT: 13.3 % (ref 11–15)
HCT VFR BLD AUTO: 42.2 %
HGB BLD-MCNC: 14 G/DL
IMM GRANULOCYTES # BLD AUTO: 0.01 X10(3) UL (ref 0–1)
IMM GRANULOCYTES NFR BLD: 0.2 %
IRON SATN MFR SERPL: 26 %
IRON SERPL-MCNC: 120 UG/DL
LYMPHOCYTES # BLD AUTO: 2.48 X10(3) UL (ref 1–4)
LYMPHOCYTES NFR BLD AUTO: 38.2 %
MCH RBC QN AUTO: 31.3 PG (ref 26–34)
MCHC RBC AUTO-ENTMCNC: 33.2 G/DL (ref 31–37)
MCV RBC AUTO: 94.2 FL
MONOCYTES # BLD AUTO: 0.85 X10(3) UL (ref 0.1–1)
MONOCYTES NFR BLD AUTO: 13.1 %
NEUTROPHILS # BLD AUTO: 2.92 X10 (3) UL (ref 1.5–7.7)
NEUTROPHILS # BLD AUTO: 2.92 X10(3) UL (ref 1.5–7.7)
NEUTROPHILS NFR BLD AUTO: 44.8 %
PLATELET # BLD AUTO: 263 10(3)UL (ref 150–450)
RBC # BLD AUTO: 4.48 X10(6)UL
T3FREE SERPL-MCNC: 4.16 PG/ML (ref 2.4–4.2)
T4 FREE SERPL-MCNC: 1 NG/DL (ref 0.8–1.7)
TIBC SERPL-MCNC: 463 UG/DL (ref 250–425)
TRANSFERRIN SERPL-MCNC: 311 MG/DL (ref 250–380)
TSI SER-ACNC: 0.79 MIU/ML (ref 0.55–4.78)
VIT D+METAB SERPL-MCNC: 25.3 NG/ML (ref 30–100)
WBC # BLD AUTO: 6.5 X10(3) UL (ref 4–11)

## 2024-09-28 PROCEDURE — 84439 ASSAY OF FREE THYROXINE: CPT | Performed by: PHYSICIAN ASSISTANT

## 2024-09-28 PROCEDURE — 83540 ASSAY OF IRON: CPT | Performed by: PHYSICIAN ASSISTANT

## 2024-09-28 PROCEDURE — 84482 T3 REVERSE: CPT | Performed by: PHYSICIAN ASSISTANT

## 2024-09-28 PROCEDURE — 82306 VITAMIN D 25 HYDROXY: CPT | Performed by: PHYSICIAN ASSISTANT

## 2024-09-28 PROCEDURE — 84466 ASSAY OF TRANSFERRIN: CPT | Performed by: PHYSICIAN ASSISTANT

## 2024-09-28 PROCEDURE — 84481 FREE ASSAY (FT-3): CPT | Performed by: PHYSICIAN ASSISTANT

## 2024-09-28 PROCEDURE — 85025 COMPLETE CBC W/AUTO DIFF WBC: CPT | Performed by: PHYSICIAN ASSISTANT

## 2024-09-28 PROCEDURE — 82728 ASSAY OF FERRITIN: CPT | Performed by: PHYSICIAN ASSISTANT

## 2024-09-28 PROCEDURE — 84443 ASSAY THYROID STIM HORMONE: CPT | Performed by: PHYSICIAN ASSISTANT

## 2024-10-04 LAB — REVERSE T3: 17 NG/DL

## 2024-11-07 RX ORDER — THYROID,PORK 15 MG
TABLET ORAL
Qty: 120 TABLET | Refills: 0 | Status: SHIPPED | OUTPATIENT
Start: 2024-11-07

## 2024-11-13 ENCOUNTER — OFFICE VISIT (OUTPATIENT)
Dept: INTEGRATIVE MEDICINE | Facility: CLINIC | Age: 59
End: 2024-11-13
Payer: COMMERCIAL

## 2024-11-13 ENCOUNTER — HOSPITAL ENCOUNTER (OUTPATIENT)
Age: 59
Discharge: HOME | End: 2024-11-13
Payer: COMMERCIAL

## 2024-11-13 VITALS
RESPIRATION RATE: 16 BRPM | OXYGEN SATURATION: 100 % | TEMPERATURE: 98 F | SYSTOLIC BLOOD PRESSURE: 147 MMHG | DIASTOLIC BLOOD PRESSURE: 79 MMHG | HEART RATE: 61 BPM

## 2024-11-13 VITALS
DIASTOLIC BLOOD PRESSURE: 64 MMHG | WEIGHT: 116.19 LBS | BODY MASS INDEX: 19.84 KG/M2 | HEIGHT: 64 IN | OXYGEN SATURATION: 97 % | SYSTOLIC BLOOD PRESSURE: 118 MMHG | HEART RATE: 64 BPM

## 2024-11-13 DIAGNOSIS — K59.00 CONSTIPATION, UNSPECIFIED CONSTIPATION TYPE: ICD-10-CM

## 2024-11-13 DIAGNOSIS — M81.8 OTHER OSTEOPOROSIS, UNSPECIFIED PATHOLOGICAL FRACTURE PRESENCE: ICD-10-CM

## 2024-11-13 DIAGNOSIS — R03.0 ELEVATED BLOOD PRESSURE READING: ICD-10-CM

## 2024-11-13 DIAGNOSIS — E55.9 VITAMIN D DEFICIENCY: ICD-10-CM

## 2024-11-13 DIAGNOSIS — E03.9 HYPOTHYROIDISM, UNSPECIFIED TYPE: Primary | ICD-10-CM

## 2024-11-13 DIAGNOSIS — J01.90 ACUTE NON-RECURRENT SINUSITIS, UNSPECIFIED LOCATION: Primary | ICD-10-CM

## 2024-11-13 PROCEDURE — 99213 OFFICE O/P EST LOW 20 MIN: CPT | Performed by: PHYSICIAN ASSISTANT

## 2024-11-13 PROCEDURE — 99214 OFFICE O/P EST MOD 30 MIN: CPT | Performed by: PHYSICIAN ASSISTANT

## 2024-11-13 PROCEDURE — 3078F DIAST BP <80 MM HG: CPT | Performed by: PHYSICIAN ASSISTANT

## 2024-11-13 PROCEDURE — 3074F SYST BP LT 130 MM HG: CPT | Performed by: PHYSICIAN ASSISTANT

## 2024-11-13 PROCEDURE — G2211 COMPLEX E/M VISIT ADD ON: HCPCS | Performed by: PHYSICIAN ASSISTANT

## 2024-11-13 PROCEDURE — 3008F BODY MASS INDEX DOCD: CPT | Performed by: PHYSICIAN ASSISTANT

## 2024-11-13 RX ORDER — CALCIUM/PHOS/GENIST/D3/ZN/K 500MG-70MG
1 CAPSULE ORAL 2 TIMES DAILY
Qty: 180 CAPSULE | Refills: 1 | Status: SHIPPED | OUTPATIENT
Start: 2024-11-13

## 2024-11-13 NOTE — PATIENT INSTRUCTIONS
Sinatrol by thereNow    The standardized nutrients and botanicals in Sinatrol help support microflora balance, promote normal mucus viscosity for healthy sinus function, and promote a balanced inflammatory response. Also available in convenient blister packs.  Directions: 3 capsules per day as needed    2. Xlear Xylitol and Saline Nasal Spray    Xlear’s secret is the patented, natural saline spray containing xylitol--an ingredient which cleanses and moisturizes.    Normal saline nasal sprays can dry out the nasal passage, actually leaving it more irritated and susceptible to external contaminants. But with the addition of xylitol, Xlear Saline Nasal Spray with xylitol will moisturize and soothe your sinus and nasal passages.    Xylitol also has cleansing benefits; it almost acts like soap for your nose. The industry-leading formula alleviates congestion and also prevents bacteria and other pollutants from sticking to nasal tissues.    Non[employer]GMO [employer] ingredients guarantee the products you put in your body are natural.  Drug-free formula can be used as often as needed without the risk of addiction or rebound.    2-4 sprays each nostril.      3. FOSTEUM Plus for the osteopenia - RX sent to [facility]     4. Labs just before appt in 6mos.    5. [facility] clinic:  [email or web address]  Dr. Atul Diez  [address]  [address]?[place] [address]

## 2024-11-13 NOTE — PROGRESS NOTES
Tricia Danielson is a 59 year old female.  Chief Complaint   Patient presents with    Follow - Up     Lab results -thyroid - Vitamin B -        HPI:   60yo female with known h/o hypothyroidism presents for f/u.  Last labs 9/28/24.    Sinus headache last 10days - tyl prn  Hot water and lemon  Saline NS     Hypothyroid - [place] Thyroid 45 mg daily  normal cold intolerance, fatigue,  brittle hair.   Still dealing with a lot of fatigue despite more sleep and exercise this summer.     Constipation improved with Metamucil in the AM.   Bloating only with certain foods only.     DEXA (from PCP) showed osteoporosis/osteopenia.   Does do weight resistance exercises.   Did tolerate the sample of Fosteum Plus    Vit D has been low - started on the [employer] 5000ius     Fatigue persists - low ferritin, iron ok; Very low Vit D  Denies any h/o mono.     --------------------------------------------Last OV Feb 2024 -----------------------    Last labs 12/15/23.     S/P perforated diverticulitis December -symptomatically resolved. Has started to eat more, but noticed she felt better with smaller amounts of food. Otherwise feels sluggish and weighted down. Still trying to do small amounts instead of larger meals.   GE had her start the metamucil every morning. Helps with her being shantel regular.   Had f/u abdominal CT and   H/o Dysbiosis     Now at goal weight.    Hypothyroid - was taking only 15mg instead of 45mg daily due to error in Sig. She did notice she was more fatigued and cold with lower dose.   Some brain fog, fatigue still remains.    Some improvement in constipation, fatigue, brittle hair, and cold intolerance, and no longer feeling \"puffy all over.\"   Bloating much improved.     Saw Midwife at Gyn for Pap and she recommended she start on the Estradiol vaginal cream due to atrophic tissue -has not started yet.  Patient denies frequent UTIs, dyspareunia.    Currently on the Probiotic Culturelle, MitoCore (x2/d), Turmeric.      H/o Raynaud's (17-19yo)      REVIEW OF SYSTEMS:   Review of Systems   Constitutional:  Positive for fatigue. Negative for chills and fever.   HENT: Negative.     Eyes: Negative.  Negative for visual disturbance.   Respiratory: Negative.  Negative for cough, shortness of breath and wheezing.    Cardiovascular: Negative.  Negative for chest pain and palpitations.   Gastrointestinal:  Positive for constipation. Negative for abdominal distention, abdominal pain and diarrhea.   Endocrine: Positive for cold intolerance.   Genitourinary: Negative.    Musculoskeletal: Negative.    Skin: Negative.    Allergic/Immunologic: Negative.    Neurological: Negative.  Negative for weakness and headaches.   Hematological: Negative.    Psychiatric/Behavioral: Negative.  Negative for sleep disturbance.             FAMILY HISTORY:      Family History   Problem Relation Age of Onset    Cancer Father         Stomach cancer    Hypertension Mother     Cancer Mother      Father passed in 40s from gastric cancer  MEDICAL HISTORY:     Past Medical History:    Allergic rhinitis    Allergy    Back problem    C2-3 mild central, C4-5 left mild foraminal, C5-6 right mod foraminal & left mild-mod, C6-7 mild-mod diffuse bulging discs    C3-4 small central, C4-5 small central herniated discs    C5-6 right mod foraminal stenosis    Constipation    Diverticular disease    Family history of basal cell carcinoma    History of atypical nevus    Hyperglycemia    L3-4 grade 1 spondylolisthesis    L3-4 mild-mod diffuse bulging disc    L4-5 mild-mod central bulging disc with annular tear    L4-5 mild-mod central stenosis    L5-S1 left mild-mod, L3-4 left mild foraminal stenosis    L5-S1 left paracentral mild-mod herniated disc    left C7 radiculitis    left L5-S1 radiculitis    Seizure disorder (HCC)    Seizures (HCC)    Stroke (HCC)    Thyroid disorder       CURRENT MEDICATIONS:     Current Outpatient Medications   Medication Sig Dispense Refill    ARMOUR  THYROID 15 MG Oral Tab Take 4 tablets (60 mg total) by mouth daily. 120 tablet 0    estradiol 0.1 MG/GM Vaginal Cream Place 0.5 g vaginally daily twice weekly 42.5 g 0    Cetirizine HCl (ZYRTEC OR) Take by mouth.      Turmeric (QC TUMERIC COMPLEX OR) Take 1 tablet by mouth daily.      Multiple Vitamins-Minerals (MULTI-VITAMIN/MINERALS) Oral Tab Take 1 tablet by mouth daily.      Probiotic Product (PROBIOTIC DAILY OR) Take by mouth daily.      dicyclomine 10 MG Oral Cap Take 1 capsule (10 mg total) by mouth 2 (two) times daily as needed. (Patient not taking: Reported on 11/13/2024) 30 capsule 3       SOCIAL HISTORY:   Lifestyle Factors affecting health:    Diet -  Already avoids sugar and certain food combinations, minimal dairy.    Has done Elimination diet/Whole 30.    Exercise - exercise bike, stretching 5x/wk. Not tired afterwards, will feel more energized.    Stress - fluctuates.  Sleep - ok, trying to get into bed earlier, averaging 7hours. Will wake at 3am and then goes back to sleep.    Teacher  Social History     Socioeconomic History    Marital status:    Tobacco Use    Smoking status: Never     Passive exposure: Never    Smokeless tobacco: Never   Vaping Use    Vaping status: Never Used   Substance and Sexual Activity    Alcohol use: Not Currently     Comment: Beer and Wine, Occasionally;     Drug use: No   Other Topics Concern    Caffeine Concern Yes     Comment: Soda, Coffee, 2 cups;     Exercise Yes     Comment: stretching and hep    History of tanning No    Pt has a pacemaker No    Pt has a defibrillator No    Reaction to local anesthetic No    Left Handed No    Right Handed Yes    Currently spends a great deal of time in the sun No    Hx of Spending Great Deal of Time in Sun Yes    Bad sunburns in the past Yes     Comment: once    Tanning Salons in the Past No    Hx of Radiation Treatments No   Social History Narrative    The patient does not use an assistive device..      The patient does live  in a home with stairs.     Social Drivers of Health     Financial Resource Strain: Low Risk  (1/2/2024)    Financial Resource Strain     Difficulty of Paying Living Expenses: Not very hard     Med Affordability: No   Food Insecurity: No Food Insecurity (12/25/2023)    Food Insecurity     Food Insecurity: Never true   Transportation Needs: No Transportation Needs (1/2/2024)    Transportation Needs     Lack of Transportation: No   Housing Stability: Low Risk  (12/25/2023)    Housing Stability     Housing Instability: No       SURGICAL HISTORY:     Past Surgical History:   Procedure Laterality Date    Arthroscopy of joint unlisted      Knee surgery      Shanda biopsy stereo nodule 1 site right (cpt=19081)  01/05/2017    benign    Other surgical history  2005    Brain and neck    Other surgical history Left 1983    Knee    Other surgical history  1985    Devated septum    Spinal surgery - dmg         PHYSICAL EXAM:     Vitals:    11/13/24 1544   BP: 118/64   BP Location: Right arm   Patient Position: Sitting   Cuff Size: adult   Pulse: 64   SpO2: 97%       Physical Exam  Vitals reviewed.   Constitutional:       General: She is not in acute distress.     Appearance: Normal appearance.   HENT:      Mouth/Throat:      Mouth: Mucous membranes are moist.      Pharynx: Oropharynx is clear.   Eyes:      Extraocular Movements: Extraocular movements intact.      Conjunctiva/sclera: Conjunctivae normal.   Neck:      Thyroid: No thyromegaly.   Cardiovascular:      Rate and Rhythm: Normal rate and regular rhythm.      Pulses: Normal pulses.      Heart sounds: Normal heart sounds. No murmur heard.     No gallop.   Pulmonary:      Effort: Pulmonary effort is normal.      Breath sounds: Normal breath sounds. No wheezing or rales.   Musculoskeletal:         General: No swelling or deformity.   Skin:     General: Skin is warm and dry.   Neurological:      General: No focal deficit present.      Mental Status: She is alert and oriented to  person, place, and time.      Deep Tendon Reflexes: Reflexes normal.   Psychiatric:         Mood and Affect: Mood normal.         Behavior: Behavior normal.         Thought Content: Thought content normal.         Judgment: Judgment normal.          ASSESSMENT AND PLAN:     [facility] Lab Encounter on 09/28/2024   Component Date Value Ref Range Status    Iron 09/28/2024 120  50 - 170 ug/dL Final    Transferrin 09/28/2024 311  250 - 380 mg/dL Final    Total Iron Binding Capacity 09/28/2024 463 (H)  250 - 425 ug/dL Final    % Saturation 09/28/2024 26  15 - 50 % Final    WBC 09/28/2024 6.5  4.0 - 11.0 x10(3) uL Final    RBC 09/28/2024 4.48  3.80 - 5.30 x10(6)uL Final    HGB 09/28/2024 14.0  12.0 - 16.0 g/dL Final    HCT 09/28/2024 42.2  35.0 - 48.0 % Final    MCV 09/28/2024 94.2  80.0 - 100.0 fL Final    MCH 09/28/2024 31.3  26.0 - 34.0 pg Final    MCHC 09/28/2024 33.2  31.0 - 37.0 g/dL Final    RDW-SD 09/28/2024 46.6 (H)  35.1 - 46.3 fL Final    RDW 09/28/2024 13.3  11.0 - 15.0 % Final    PLT 09/28/2024 263.0  150.0 - 450.0 10(3)uL Final    Neutrophil Absolute Prelim 09/28/2024 2.92  1.50 - 7.70 x10 (3) uL Final    Neutrophil Absolute 09/28/2024 2.92  1.50 - 7.70 x10(3) uL Final    Lymphocyte Absolute 09/28/2024 2.48  1.00 - 4.00 x10(3) uL Final    Monocyte Absolute 09/28/2024 0.85  0.10 - 1.00 x10(3) uL Final    Eosinophil Absolute 09/28/2024 0.20  0.00 - 0.70 x10(3) uL Final    Basophil Absolute 09/28/2024 0.04  0.00 - 0.20 x10(3) uL Final    Immature Granulocyte Absolute 09/28/2024 0.01  0.00 - 1.00 x10(3) uL Final    Neutrophil % 09/28/2024 44.8  % Final    Lymphocyte % 09/28/2024 38.2  % Final    Monocyte % 09/28/2024 13.1  % Final    Eosinophil % 09/28/2024 3.1  % Final    Basophil % 09/28/2024 0.6  % Final    Immature Granulocyte % 09/28/2024 0.2  % Final    TSH 09/28/2024 0.787  0.550 - 4.780 mIU/mL Final    Free T4 09/28/2024 1.0  0.8 - 1.7 ng/dL Final    T3 Free 09/28/2024 4.16  2.40 - 4.20 pg/mL Final    Reverse  T3 09/28/2024 17.0  9.2 - 24.1 ng/dL Final    Ferritin 09/28/2024 43 (L)  50 - 306 ng/mL Final    Vitamin D, 25OH, Total 09/28/2024 25.3 (L)  30.0 - 100.0 ng/mL Final    Literature Recommendations for 25(OH)D levels are:  Range           Vitamin D Status   <20    ng/mL      Deficiency   20-<30 ng/mL      Insufficiency    ng/mL      Sufficiency   >100   ng/mL      Toxicity    *Clinical controversy exists regarding optimal 25(OH)D levels. Emerging evidence links potential adverse effects to high levels, particularly >60 ng/mL.       Orders Only on 07/30/2024   Component Date Value Ref Range Status    Urine Color 07/31/2024 Light-Yellow  Yellow Final    Clarity Urine 07/31/2024 Clear  Clear Final    Spec Gravity 07/31/2024 1.006  1.005 - 1.030 Final    Glucose Urine 07/31/2024 Normal  Normal mg/dL Final    Bilirubin Urine 07/31/2024 Negative  Negative Final    Ketones Urine 07/31/2024 Negative  Negative mg/dL Final    Blood Urine 07/31/2024 Negative  Negative Final    pH Urine 07/31/2024 7.0  5.0 - 8.0 Final    Protein Urine 07/31/2024 Negative  Negative mg/dL Final    Urobilinogen Urine 07/31/2024 Normal  Normal Final    Nitrite Urine 07/31/2024 Negative  Negative Final    Leukocyte Esterase Urine 07/31/2024 Negative  Negative Final    Microscopic 07/31/2024 Microscopic not indicated   Final   [facility] Lab Encounter on 07/30/2024   Component Date Value Ref Range Status    Glucose 07/30/2024 100 (H)  70 - 99 mg/dL Final    Sodium 07/30/2024 141  136 - 145 mmol/L Final    Potassium 07/30/2024 3.6  3.5 - 5.1 mmol/L Final    Chloride 07/30/2024 105  98 - 112 mmol/L Final    CO2 07/30/2024 29.0  21.0 - 32.0 mmol/L Final    Anion Gap 07/30/2024 7  0 - 18 mmol/L Final    BUN 07/30/2024 13  9 - 23 mg/dL Final    Creatinine 07/30/2024 0.88  0.55 - 1.02 mg/dL Final    BUN/CREA Ratio 07/30/2024 14.8  10.0 - 20.0 Final    Calcium, Total 07/30/2024 9.5  8.7 - 10.4 mg/dL Final    Calculated Osmolality 07/30/2024 738 455 - 907  mOsm/kg Final    eGFR-Cr 07/30/2024 76  >=60 mL/min/1.73m2 Final    ALT 07/30/2024 21  10 - 49 U/L Final    AST 07/30/2024 27  <34 U/L Final    Alkaline Phosphatase 07/30/2024 71  46 - 118 U/L Final    Bilirubin, Total 07/30/2024 0.6  0.3 - 1.2 mg/dL Final    Total Protein 07/30/2024 7.0  5.7 - 8.2 g/dL Final    Albumin 07/30/2024 4.4  3.2 - 4.8 g/dL Final    Globulin  07/30/2024 2.6  2.0 - 3.5 g/dL Final    A/G Ratio 07/30/2024 1.7  1.0 - 2.0 Final    Patient Fasting for CMP? 07/30/2024 Yes   Final    Cholesterol, Total 07/30/2024 213 (H)  <200 mg/dL Final    Desirable  <200 mg/dL  Borderline  200-239 mg/dL  High      >=240 mg/dL        HDL Cholesterol 07/30/2024 121 (H)  40 - 59 mg/dL Final    Interpretive Information:   An HDL cholesterol <40 mg/dL is low and constitutes a coronary heart disease risk factor. An HDL cholesterol >60 mg/dL is a negative risk factor for coronary heart disease.        Triglycerides 07/30/2024 46  30 - 149 mg/dL Final    Reference interval for fasting triglycerides  Desirable: <150 mg/dL  Borderline: 150-199 mg/dL  High: 200-499 mg/dL  Very High: >=500 mg/dL          LDL Cholesterol 07/30/2024 83  <100 mg/dL Final    Optimal            <100 mg/dL   Near/Above OptimaL 100-129 mg/dL   Borderline High    130-159 mg/dL   High               160-189 mg/dL    Very High          >190 mg/dL         VLDL 07/30/2024 7  0 - 30 mg/dL Final    Non HDL Chol 07/30/2024 92  <130 mg/dL Final    Desirable  <130 mg/dL   Borderline  130-159 mg/dL   High        160-189 mg/dL       Very high >=190 mg/dL        Patient Fasting for Lipid? 07/30/2024 Yes   Final    HgbA1C 07/30/2024 5.5  <5.7 % Final     Normal HbA1C:     <5.7%      Pre-Diabetic:     5.7 - 6.4%      Diabetic:         >6.4%      Diabetic Control: <7.0%        Estimated Average Glucose 07/30/2024 111  68 - 126 mg/dL Final    eAG is the estimated average glucose calculated from Hgb A1c according to the formula recommended by the American Diabetes  Association. eAG levels reflect the long term average glucose and may not correlate with random or fasting glucose levels since these represent specific points in time.           Urine Color 07/30/2024 Light-Yellow  Yellow Final    Clarity Urine 07/30/2024 Clear  Clear Final    Spec Gravity 07/30/2024 1.015  1.005 - 1.030 Final    Glucose Urine 07/30/2024 Normal  Normal mg/dL Final    Bilirubin Urine 07/30/2024 Negative  Negative Final    Ketones Urine 07/30/2024 Negative  Negative mg/dL Final    Blood Urine 07/30/2024 Negative  Negative Final    pH Urine 07/30/2024 6.5  5.0 - 8.0 Final    Protein Urine 07/30/2024 Negative  Negative mg/dL Final    Urobilinogen Urine 07/30/2024 Normal  Normal Final    Nitrite Urine 07/30/2024 Negative  Negative Final    Leukocyte Esterase Urine 07/30/2024 500 (A)  Negative Final      Sven/uL Interpretation  25          Trace  75          1+  250         2+  500         3+    WBC Urine 07/30/2024 21-50 (A)  0 - 5 /HPF Final    RBC Urine 07/30/2024 0-2  0 - 2 /HPF Final    Bacteria Urine 07/30/2024 Rare (A)  None Seen /HPF Final    Squamous Epi. Cells 07/30/2024 Few (A)  None Seen /HPF Final    Renal Tubular Epithelial Cells 07/30/2024 None Seen  None Seen /HPF Final    Transitional Cells 07/30/2024 Few (A)  None Seen /HPF Final    Yeast Urine 07/30/2024 None Seen  None Seen /HPF Final    WBC 07/30/2024 6.0  4.0 - 11.0 x10(3) uL Final    RBC 07/30/2024 4.61  3.80 - 5.30 x10(6)uL Final    HGB 07/30/2024 14.6  12.0 - 16.0 g/dL Final    HCT 07/30/2024 44.1  35.0 - 48.0 % Final    MCV 07/30/2024 95.7  80.0 - 100.0 fL Final    MCH 07/30/2024 31.7  26.0 - 34.0 pg Final    MCHC 07/30/2024 33.1  31.0 - 37.0 g/dL Final    RDW-SD 07/30/2024 50.2 (H)  35.1 - 46.3 fL Final    RDW 07/30/2024 14.4  11.0 - 15.0 % Final    PLT 07/30/2024 232.0  150.0 - 450.0 10(3)uL Final    Neutrophil Absolute Prelim 07/30/2024 2.57  1.50 - 7.70 x10 (3) uL Final    Neutrophil Absolute 07/30/2024 2.57  1.50 - 7.70  x10(3) uL Final    Lymphocyte Absolute 07/30/2024 2.29  1.00 - 4.00 x10(3) uL Final    Monocyte Absolute 07/30/2024 0.79  0.10 - 1.00 x10(3) uL Final    Eosinophil Absolute 07/30/2024 0.30  0.00 - 0.70 x10(3) uL Final    Basophil Absolute 07/30/2024 0.04  0.00 - 0.20 x10(3) uL Final    Immature Granulocyte Absolute 07/30/2024 0.01  0.00 - 1.00 x10(3) uL Final    Neutrophil % 07/30/2024 42.7  % Final    Lymphocyte % 07/30/2024 38.2  % Final    Monocyte % 07/30/2024 13.2  % Final    Eosinophil % 07/30/2024 5.0  % Final    Basophil % 07/30/2024 0.7  % Final    Immature Granulocyte % 07/30/2024 0.2  % Final   [facility] Lab Encounter on 07/19/2024   Component Date Value Ref Range Status    TSH 07/19/2024 1.165  0.550 - 4.780 mIU/mL Final    Free T4 07/19/2024 0.9  0.8 - 1.7 ng/dL Final    T3 Free 07/19/2024 3.38  2.40 - 4.20 pg/mL Final    Reverse T3 07/19/2024 13.5  9.2 - 24.1 ng/dL Final    Vitamin D, 25OH, Total 07/19/2024 27.0 (L)  30.0 - 100.0 ng/mL Final    Literature Recommendations for 25(OH)D levels are:  Range           Vitamin D Status   <20    ng/mL      Deficiency   20-<30 ng/mL      Insufficiency    ng/mL      Sufficiency   >100   ng/mL      Toxicity    *Clinical controversy exists regarding optimal 25(OH)D levels. Emerging evidence links potential adverse effects to high levels, particularly >60 ng/mL.          No results found.    There are no diagnoses linked to this encounter.          Time spent with patient: Over 30 minutes spent in chart review and in direct communication with patient obtaining and reviewing history, creating a unique care plan, explaining the rationale for treatment, reviewing potential SE and overall treatment plan,  documenting all clinical information in Epic. Over 50% of this time was in education, counseling and coordination of care.     Problem List Items Addressed This Visit    None        Endocrine:  Hypothyroid-Stable on increased dose of 60 mg daily.  -> Recheck levels in  6mos    Osteopenia/osteoporosis - Discussed ways she could increase bone strength as well as a possible evaluation with [facility]  -> Fosteum plus prescription sent in    GI: Dysbiosis and constipation has improved.      Low vitamin D -patient has started on vitamin D3 plus K2 5000 IUs daily.  Recommend taking 2 capsules every other day for the next 3 months.  Then back down to 5000 IUs daily and we will recheck.    Given further recommendations as below    Orders Placed This Visit:  No orders of the defined types were placed in this encounter.    No orders of the defined types were placed in this encounter.      There are no Patient Instructions on file for this visit.    No follow-ups on file.    Patient affirmed understanding of plan and all questions were answered.     Meggan Yañez PA-C

## 2024-11-13 NOTE — ED PROVIDER NOTES
Patient Seen in: [facility]    History   No chief complaint on file.    Stated Complaint: Sinus Problem    HPI    59-year-old female presents with chief complaint of sinus pain.  Onset 10 days ago.  Patient reports associated nasal congestion.  Patient reports history of acute sinusitis and states her symptoms are similar.  Patient denies fever, chills, cough, earache, sore throat, abdominal pain, nausea, vomiting, diarrhea, constipation, dysuria, hematuria, flank pain, rash, neck pain, neck swelling, restricted neck movement, dyspnea, wheeze, hemoptysis, weakness, paresthesias, vision changes, altered mental status, loss of consciousness, amnesia.      Past Medical History:    Allergic rhinitis    Allergy    Back problem    C2-3 mild central, C4-5 left mild foraminal, C5-6 right mod foraminal & left mild-mod, C6-7 mild-mod diffuse bulging discs    C3-4 small central, C4-5 small central herniated discs    C5-6 right mod foraminal stenosis    Constipation    Diverticular disease    Family history of basal cell carcinoma    History of atypical nevus    Hyperglycemia    L3-4 grade 1 spondylolisthesis    L3-4 mild-mod diffuse bulging disc    L4-5 mild-mod central bulging disc with annular tear    L4-5 mild-mod central stenosis    L5-S1 left mild-mod, L3-4 left mild foraminal stenosis    L5-S1 left paracentral mild-mod herniated disc    left C7 radiculitis    left L5-S1 radiculitis    Seizure disorder (HCC)    Seizures (HCC)    Stroke (HCC)    Thyroid disorder       Past Surgical History:   Procedure Laterality Date    Arthroscopy of joint unlisted      Knee surgery      Shanda biopsy stereo nodule 1 site right (cpt=19081)  01/05/2017    benign    Other surgical history  2005    Brain and neck    Other surgical history Left 1983    Knee    Other surgical history  1985    Devated septum    Spinal surgery - dmg              Family History   Problem Relation Age of Onset    Cancer Father         Stomach cancer     Hypertension Mother     Cancer Mother        Social History     Socioeconomic History    Marital status:    Tobacco Use    Smoking status: Never     Passive exposure: Never    Smokeless tobacco: Never   Vaping Use    Vaping status: Never Used   Substance and Sexual Activity    Alcohol use: Not Currently     Comment: Beer and Wine, Occasionally;     Drug use: No   Other Topics Concern    Caffeine Concern Yes     Comment: Soda, Coffee, 2 cups;     Exercise Yes     Comment: stretching and hep    History of tanning No    Pt has a pacemaker No    Pt has a defibrillator No    Reaction to local anesthetic No    Left Handed No    Right Handed Yes    Currently spends a great deal of time in the sun No    Hx of Spending Great Deal of Time in Sun Yes    Bad sunburns in the past Yes     Comment: once    Tanning Salons in the Past No    Hx of Radiation Treatments No   Social History Narrative    The patient does not use an assistive device..      The patient does live in a home with stairs.     Social Drivers of Health     Financial Resource Strain: Low Risk  (1/2/2024)    Financial Resource Strain     Difficulty of Paying Living Expenses: Not very hard     Med Affordability: No   Food Insecurity: No Food Insecurity (12/25/2023)    Food Insecurity     Food Insecurity: Never true   Transportation Needs: No Transportation Needs (1/2/2024)    Transportation Needs     Lack of Transportation: No   Housing Stability: Low Risk  (12/25/2023)    Housing Stability     Housing Instability: No       Review of Systems    Positive for stated complaint: Sinus Problem  Other systems are as noted in HPI.  Constitutional and vital signs reviewed.      All other systems reviewed and negative except as noted above.    PSFH elements reviewed from today and agreed except as otherwise stated in HPI.    Physical Exam     ED Triage Vitals [11/13/24 1639]   /79   Pulse 61   Resp 16   Temp 97.8 °F (36.6 °C)   Temp src Oral   SpO2 100 %    O2 Device None (Room air)       Current:/79   Pulse 61   Temp 97.8 °F (36.6 °C) (Oral)   Resp 16   SpO2 100%     PULSE OX within normal limits on room air as interpreted by this provider.    Constitutional: The patient is cooperative. Appears well-developed and well-nourished.  Mild discomfort.  Psychological: Alert, No abnormalities of mood, affect.  Head: Normocephalic/atraumatic.   Eyes: Pupils are equal round reactive to light.  Conjunctiva are within normal limits.  ENT: Pharynx noninjected.  Tonsils within normal size limits bilaterally.  No tonsillar exudates.  TMs within normal limits bilaterally.  Mucous membranes moist.  Positive nasal congestion.  Positive erythema and edema of the left nasal mucosa.  Neck: The neck is supple.  Nontender.  No meningeal signs.  Respiratory: Respiratory effort was normal.  There is no stridor.  Air entry is equal.  Cardiovascular: Regular rate and rhythm.  Capillary refill is brisk.    Genitourinary: Not examined.  Lymphatic: No gross lymphadenopathy noted.  Musculoskeletal: Musculoskeletal system is grossly intact.  There is no obvious deformity.  Neurological: No facial asymmetry.  Normal gait.  Normal sensory exam.  Patient exhibits normal speech.  Strength and range of motion symmetrical of all extremities x4.  Skin: Skin is normal to inspection.  Warm and dry.  No obvious bruising.  No obvious rash.          ED Course   Labs Reviewed - No data to display    MDM     Differential diagnosis including but not limited to URI, sinusitis, rhinitis    Physical exam remained stable over serial reexaminations as previously documented.  Physical exam findings discussed with patient.    I have given the patient instructions regarding their diagnoses, expectations, follow up, and ER precautions. I explained to the patient that emergent conditions may arise and to go to the ER for new, worsening or any persistent conditions. I've explained the importance of following up  with their doctor as instructed. The patient verbalized understanding of the discharge instructions and plan.    The patient was informed of their elevated blood pressure reading at immediate care.  They were informed of the dangers of undiagnosed and untreated hypertension.  Education regarding lifestyle modifications and the need for appropriate follow-up with their PCP to have their blood pressure re-checked within 24-48 hours was provided.    Disposition and Plan     Clinical Impression:  1. Acute non-recurrent sinusitis, unspecified location    2. Elevated blood pressure reading        Disposition:  Discharge    Follow-up:  Emanuel Whatley MD  [address] [place] [address] [id number] [phone]    Call in 1 day  For follow-up      Medications Prescribed:  Current Discharge Medication List        START taking these medications    Details   amoxicillin clavulanate 875-125 MG Oral Tab Take 1 tablet by mouth 2 (two) times daily for 7 days.  Qty: 14 tablet, Refills: 0

## 2024-12-04 RX ORDER — THYROID,PORK 15 MG
TABLET ORAL
Qty: 120 TABLET | Refills: 0 | Status: SHIPPED | OUTPATIENT
Start: 2024-12-04

## 2025-01-02 NOTE — TELEPHONE ENCOUNTER
A refill request was received for:  Requested Prescriptions     Pending Prescriptions Disp Refills    ARMOUR THYROID 15 MG Oral Tab [Pharmacy Med Name: Mcloud Thyroid 15 Mg Tab Chucky] 120 tablet 0     Sig: TAKE FOUR TABLETS BY MOUTH DAILY     Last refill date: 12/4/24    Qty: 120 and 0   Dx: hypothyroidism   Last office visit: 11/13/24    When is follow up due:  5/13/25      Future Appointments   Date Time Provider Department Center   5/13/2025  4:30 PM Meggan Yañez PA-C [facility]

## 2025-01-03 RX ORDER — THYROID,PORK 15 MG
60 TABLET ORAL DAILY
Qty: 120 TABLET | Refills: 0 | Status: SHIPPED | OUTPATIENT
Start: 2025-01-03

## 2025-02-10 RX ORDER — THYROID,PORK 15 MG
60 TABLET ORAL DAILY
Qty: 120 TABLET | Refills: 0 | Status: SHIPPED | OUTPATIENT
Start: 2025-02-10

## 2025-02-24 ENCOUNTER — NURSE TRIAGE (OUTPATIENT)
Dept: INTERNAL MEDICINE CLINIC | Facility: CLINIC | Age: 60
End: 2025-02-24

## 2025-02-24 NOTE — TELEPHONE ENCOUNTER
Action Requested: Summary for Provider     []  Critical Lab, Recommendations Needed  [] Need Additional Advice  []   FYI    []   Need Orders  [] Need Medications Sent to Pharmacy  []  Other     SUMMARY: Patient reports history of Chiari malformation.  Reports history of syinx.  Feels she is having return of nerve pain that goes down left arm.  Denies chest pain or shortness of breath.  Per request of patient, scheduled appointment with Dr Whatley.  Advised if worsens prior to appointment, go to ER.  Patient agreed to plan.    Future Appointments   Date Time Provider Department Center   2025  3:00 PM Emanuel Whatley MD [facility]   2025  4:00 PM [facility]   2025  4:30 PM Meggan Yañez, NIT[facility] QUINNG [facility]     Reason for call: Acute Left arm pain.  Onset: Yesterday    Spoke with patient,  verified.   Pain started yesterday in left shoulder blade, goes down arm into hand.  Feels same as previously.  Only wants to see Dr Whatley, who is not in office today.    Reason for Disposition   Patient wants to be seen    Protocols used: Arm Pain-A-OH

## 2025-02-26 ENCOUNTER — OFFICE VISIT (OUTPATIENT)
Dept: INTERNAL MEDICINE CLINIC | Facility: CLINIC | Age: 60
End: 2025-02-26
Payer: COMMERCIAL

## 2025-02-26 VITALS
WEIGHT: 116 LBS | DIASTOLIC BLOOD PRESSURE: 84 MMHG | SYSTOLIC BLOOD PRESSURE: 139 MMHG | HEART RATE: 70 BPM | BODY MASS INDEX: 19.81 KG/M2 | HEIGHT: 64 IN

## 2025-02-26 DIAGNOSIS — M51.369 BULGE OF LUMBAR DISC WITHOUT MYELOPATHY: Primary | ICD-10-CM

## 2025-02-26 DIAGNOSIS — G93.5 CHIARI I MALFORMATION (HCC): ICD-10-CM

## 2025-02-26 PROCEDURE — 3008F BODY MASS INDEX DOCD: CPT | Performed by: INTERNAL MEDICINE

## 2025-02-26 PROCEDURE — 99214 OFFICE O/P EST MOD 30 MIN: CPT | Performed by: INTERNAL MEDICINE

## 2025-02-26 PROCEDURE — 3075F SYST BP GE 130 - 139MM HG: CPT | Performed by: INTERNAL MEDICINE

## 2025-02-26 PROCEDURE — 3079F DIAST BP 80-89 MM HG: CPT | Performed by: INTERNAL MEDICINE

## 2025-03-07 NOTE — PROGRESS NOTES
HPI:    Patient ID: Tricia Danielson is a 59 year old female.    Pain  Associated symptoms include neck pain and numbness. Pertinent negatives include no abdominal pain, chest pain, chills, congestion, coughing, fatigue, fever, headaches, nausea, rash or vomiting.       Nerve pain on left side of left shoulder , arm,  hand  flare up x 4 days         Lymph Node Left side of neck lymph node      Hx of cervical disc bulge and chiari malformation    /84 (BP Location: Right arm, Patient Position: Sitting, Cuff Size: adult)   Pulse 70   Ht 5' 4\" (1.626 m)   Wt 116 lb (52.6 kg)   BMI 19.91 kg/m²   Wt Readings from Last 6 Encounters:   02/26/25 116 lb (52.6 kg)   11/13/24 116 lb 3.2 oz (52.7 kg)   08/28/24 111 lb 6.4 oz (50.5 kg)   04/30/24 111 lb (50.3 kg)   02/28/24 108 lb 6.4 oz (49.2 kg)   01/29/24 107 lb (48.5 kg)     Body mass index is 19.91 kg/m².  HGBA1C:    Lab Results   Component Value Date    A1C 5.5 07/30/2024    A1C 5.6 12/15/2023    A1C 5.9 (H) 11/08/2022     07/30/2024         Review of Systems   Constitutional:  Negative for chills, fatigue and fever.   HENT:  Negative for congestion and sinus pressure.    Respiratory:  Negative for cough, chest tightness, shortness of breath and wheezing.    Cardiovascular:  Negative for chest pain, palpitations and leg swelling.   Gastrointestinal:  Negative for abdominal pain, constipation, diarrhea, nausea and vomiting.   Genitourinary:  Negative for dysuria and hematuria.   Musculoskeletal:  Positive for neck pain and neck stiffness.   Skin:  Negative for rash and wound.   Neurological:  Positive for numbness. Negative for dizziness, syncope, light-headedness and headaches.   Psychiatric/Behavioral:  Negative for dysphoric mood. The patient is not nervous/anxious.          Current Outpatient Medications   Medication Sig Dispense Refill    ARMOUR THYROID 15 MG Oral Tab TAKE FOUR TABLETS BY MOUTH DAILY 120 tablet 0    Turmeric (QC TUMERIC COMPLEX OR)  Take 1 tablet by mouth daily.      Multiple Vitamins-Minerals (MULTI-VITAMIN/MINERALS) Oral Tab Take 1 tablet by mouth daily.      Probiotic Product (PROBIOTIC DAILY OR) Take by mouth daily.       Allergies:Allergies[1]    HISTORY:  Past Medical History:    Allergic rhinitis    Allergy    Back problem    C2-3 mild central, C4-5 left mild foraminal, C5-6 right mod foraminal & left mild-mod, C6-7 mild-mod diffuse bulging discs    C3-4 small central, C4-5 small central herniated discs    C5-6 right mod foraminal stenosis    Constipation    Diverticular disease    Family history of basal cell carcinoma    History of atypical nevus    Hyperglycemia    L3-4 grade 1 spondylolisthesis    L3-4 mild-mod diffuse bulging disc    L4-5 mild-mod central bulging disc with annular tear    L4-5 mild-mod central stenosis    L5-S1 left mild-mod, L3-4 left mild foraminal stenosis    L5-S1 left paracentral mild-mod herniated disc    left C7 radiculitis    left L5-S1 radiculitis    Seizure disorder (HCC)    Seizures (HCC)    Stroke (HCC)    Thyroid disorder      Past Surgical History:   Procedure Laterality Date    Arthroscopy of joint unlisted      Knee surgery      Shanda biopsy stereo nodule 1 site right (cpt=19081)  01/05/2017    benign    Other surgical history  2005    Brain and neck    Other surgical history Left 1983    Knee    Other surgical history  1985    Devated septum    Spinal surgery - dmg        Family History   Problem Relation Age of Onset    Cancer Father         Stomach cancer    Hypertension Mother     Cancer Mother       Social History:   Social History     Socioeconomic History    Marital status:    Tobacco Use    Smoking status: Never     Passive exposure: Never    Smokeless tobacco: Never   Vaping Use    Vaping status: Never Used   Substance and Sexual Activity    Alcohol use: Not Currently     Comment: Beer and Wine, Occasionally;     Drug use: No   Other Topics Concern    Caffeine Concern Yes     Comment:  Soda, Coffee, 2 cups;     Exercise Yes     Comment: stretching and hep    History of tanning No    Pt has a pacemaker No    Pt has a defibrillator No    Reaction to local anesthetic No    Left Handed No    Right Handed Yes    Currently spends a great deal of time in the sun No    Hx of Spending Great Deal of Time in Sun Yes    Bad sunburns in the past Yes     Comment: once    Tanning Salons in the Past No    Hx of Radiation Treatments No   Social History Narrative    The patient does not use an assistive device..      The patient does live in a home with stairs.     Social Drivers of Health     Food Insecurity: No Food Insecurity (12/25/2023)    Food Insecurity     Food Insecurity: Never true   Transportation Needs: No Transportation Needs (1/2/2024)    Transportation Needs     Lack of Transportation: No   Housing Stability: Low Risk  (12/25/2023)    Housing Stability     Housing Instability: No        PHYSICAL EXAM:    Physical Exam  Constitutional:       Appearance: She is well-developed. She is not ill-appearing.   HENT:      Right Ear: Ear canal normal.      Left Ear: Ear canal normal.      Mouth/Throat:      Pharynx: Oropharynx is clear.   Eyes:      Extraocular Movements: Extraocular movements intact.      Conjunctiva/sclera: Conjunctivae normal.      Pupils: Pupils are equal, round, and reactive to light.   Cardiovascular:      Rate and Rhythm: Normal rate and regular rhythm.      Heart sounds: Normal heart sounds.   Pulmonary:      Effort: Pulmonary effort is normal.      Breath sounds: Normal breath sounds.   Abdominal:      General: Bowel sounds are normal.      Palpations: Abdomen is soft.   Skin:     General: Skin is warm and dry.   Neurological:      Mental Status: She is alert.      Motor: No weakness.      Coordination: Coordination normal.      Gait: Gait normal.      Deep Tendon Reflexes: Reflexes normal (biceps  reflex left brisk).   Psychiatric:         Mood and Affect: Mood normal.     3/17/1965     Age/Sex: 59 year old Female  Pt. Phone: [phone]  Exam Date: 11/06/2018  Procedure: MRI SPINE CERVICAL (CPT=72141)  MRI SPINE CERVICAL (CPT=72141)      CONCLUSION:   1. Motion compromised exam.  2. Postsurgical changes of posterior fossa decompression including suboccipital craniectomy and C1 laminectomy for Chiari malformation decompression. Accounting for degree of motion limitation, there is no evidence of significant residual inferior   cerebellar tonsillar herniation or cervical cord syrinx. Normal caliber and signal characteristics of the cervical spinal cord.  3. C5-C6: Mild right greater than left neural foraminal stenosis.  4. C6-C7: Mild spinal canal and mild bilateral neural foraminal stenosis.  5. Lesser incidental findings as above.              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      Meds This Visit:  Requested Prescriptions      No prescriptions requested or ordered in this encounter       Imaging & Referrals:  MRI SPINE CERVICAL (CPT=72141)        ID#[id number]           [1] No Known Allergies

## 2025-03-20 ENCOUNTER — HOSPITAL ENCOUNTER (OUTPATIENT)
Dept: MRI IMAGING | Facility: HOSPITAL | Age: 60
Discharge: HOME | End: 2025-03-20
Attending: INTERNAL MEDICINE
Payer: COMMERCIAL

## 2025-03-20 DIAGNOSIS — M51.369 BULGE OF LUMBAR DISC WITHOUT MYELOPATHY: ICD-10-CM

## 2025-03-20 PROCEDURE — 72141 MRI NECK SPINE W/O DYE: CPT | Performed by: INTERNAL MEDICINE

## 2025-03-24 RX ORDER — THYROID,PORK 15 MG
60 TABLET ORAL DAILY
Qty: 120 TABLET | Refills: 0 | Status: SHIPPED | OUTPATIENT
Start: 2025-03-24

## 2025-03-24 NOTE — TELEPHONE ENCOUNTER
A refill request was received for:  Requested Prescriptions     Pending Prescriptions Disp Refills    ARMOUR THYROID 15 MG Oral Tab [Pharmacy Med Name: Cool Thyroid 15 Mg Tab Chucky] 120 tablet 0     Sig: TAKE FOUR TABLETS BY MOUTH DAILY     Last refill date:  2/10/25   Qty: 120 and 0   Dx: hypothyroidism   Last office visit: 11/13/24   When is follow up due:  5/13/25     Future Appointments   Date Time Provider Department Center   4/8/2025  3:40 PM Emanuel Whatley MD [facility]   5/13/2025  4:30 PM Meggan Yañez PAJerrellC [facility]

## 2025-04-02 ENCOUNTER — TELEPHONE (OUTPATIENT)
Dept: OBGYN CLINIC | Facility: CLINIC | Age: 60
End: 2025-04-02

## 2025-04-02 DIAGNOSIS — Z12.31 ENCOUNTER FOR SCREENING MAMMOGRAM FOR BREAST CANCER: Primary | ICD-10-CM

## 2025-04-02 DIAGNOSIS — R92.30 DENSE BREAST TISSUE: ICD-10-CM

## 2025-04-08 ENCOUNTER — OFFICE VISIT (OUTPATIENT)
Dept: INTERNAL MEDICINE CLINIC | Facility: CLINIC | Age: 60
End: 2025-04-08
Payer: COMMERCIAL

## 2025-04-08 VITALS
HEART RATE: 66 BPM | BODY MASS INDEX: 19.97 KG/M2 | HEIGHT: 64 IN | DIASTOLIC BLOOD PRESSURE: 79 MMHG | SYSTOLIC BLOOD PRESSURE: 123 MMHG | WEIGHT: 117 LBS | TEMPERATURE: 97 F

## 2025-04-08 DIAGNOSIS — M50.30 BULGING OF CERVICAL INTERVERTEBRAL DISC: Primary | ICD-10-CM

## 2025-04-08 DIAGNOSIS — G93.5 CHIARI I MALFORMATION (HCC): ICD-10-CM

## 2025-04-08 PROCEDURE — 3074F SYST BP LT 130 MM HG: CPT | Performed by: INTERNAL MEDICINE

## 2025-04-08 PROCEDURE — 99214 OFFICE O/P EST MOD 30 MIN: CPT | Performed by: INTERNAL MEDICINE

## 2025-04-08 PROCEDURE — 3008F BODY MASS INDEX DOCD: CPT | Performed by: INTERNAL MEDICINE

## 2025-04-08 PROCEDURE — 3078F DIAST BP <80 MM HG: CPT | Performed by: INTERNAL MEDICINE

## 2025-04-08 RX ORDER — CYCLOBENZAPRINE HCL 5 MG
5 TABLET ORAL 2 TIMES DAILY PRN
Qty: 30 TABLET | Refills: 0 | Status: SHIPPED | OUTPATIENT
Start: 2025-04-08

## 2025-04-22 RX ORDER — THYROID,PORK 15 MG
60 TABLET ORAL DAILY
Qty: 120 TABLET | Refills: 0 | Status: SHIPPED | OUTPATIENT
Start: 2025-04-22

## 2025-04-22 NOTE — PROGRESS NOTES
Subjective:     Patient ID: Tricia Danielson is a 60 year old female.    HPI  Pain  Associated symptoms include neck pain and numbness. Pertinent negatives include no abdominal pain, chest pain, chills, congestion, coughing, fatigue, fever, headaches, nausea, rash or vomiting.        Nerve pain on left side of left shoulder , arm,  hand  flare up x 4 days     /79 (BP Location: Right arm, Patient Position: Sitting, Cuff Size: adult)   Pulse 66   Temp 97.2 °F (36.2 °C) (Temporal)   Ht 5' 4\" (1.626 m)   Wt 117 lb (53.1 kg)   BMI 20.08 kg/m²   Wt Readings from Last 6 Encounters:   04/08/25 117 lb (53.1 kg)   02/26/25 116 lb (52.6 kg)   11/13/24 116 lb 3.2 oz (52.7 kg)   08/28/24 111 lb 6.4 oz (50.5 kg)   04/30/24 111 lb (50.3 kg)   02/28/24 108 lb 6.4 oz (49.2 kg)     Body mass index is 20.08 kg/m².  HGBA1C:    Lab Results   Component Value Date    A1C 5.5 07/30/2024    A1C 5.6 12/15/2023    A1C 5.9 (H) 11/08/2022     07/30/2024       History/Other:   Review of Systems   Constitutional:  Negative for chills and fever.   Cardiovascular:  Negative for chest pain.   Gastrointestinal:  Negative for abdominal pain.   Genitourinary:  Negative for difficulty urinating.   Musculoskeletal:  Positive for neck pain.   Neurological:  Positive for numbness.     Current Medications[1]  Allergies:Allergies[2]    Past Medical History[3]   Past Surgical History[4]   Family History[5]   Social History: Short Social Hx on File[6]     Objective:   Physical Exam  Constitutional:       Appearance: She is not ill-appearing.   Musculoskeletal:         General: Tenderness (cervical paraspinal muscleds) present. No deformity.   Neurological:      Mental Status: She is alert.      Motor: No weakness.      Gait: Gait normal.       PROCEDURE: MRI SPINE CERVICAL (CPT=72141)     COMPARISON: Health system, MRI CERVICAL SPINE LAURA CEDILLO, 4/25/2013, 2:02 PM.  Health system, MRI CERVICAL  SPINE WWO CONTRA, 10/07/2005, 9:11 AM.  [facility], MRI CERVICAL SPINE WO  CONTRAS, 9/28/2005, 9:21 AM.  [facility], MRI SPINE CERVICAL (CPT=72141), 11/06/2018, 6:30 PM.     INDICATIONS: Cervical neck pain with radiculopathy.     TECHNIQUE: A variety of imaging planes and parameters were utilized for visualization of suspected pathology.       FINDINGS:  COMMENT: Overall examination quality is somewhat compromised by patient motion artifact.  ALIGNMENT: The anatomic cervical lordosis is slightly reversed and interrupted by slight retrolisthesis of C5 on C6.  BONES: No fractures or osseous lesions are apparent. Multilevel anterior osteophyte formation is demonstrated. Mild reciprocal endplate signal abnormalities are likely degenerative in nature.  CORD: Normal caliber, contour, and signal intensity. Previously seen susceptibility artifact of the C2-C3 level is re-identified (series 8, image 7).    CRANIOCERVICAL AREA: Postoperative changes of suboccipital craniectomy and C1 laminectomy related to Chiari decompression are redemonstrated.    PARASPINAL AREA: No visible mass.    OTHER: There is no visible swelling of the prevertebral soft tissues.       CERVICAL DISC LEVELS:  C2-C3: A tiny central disc protrusion is seen without resultant spinal canal or foraminal compromise.  C3-C4: Minimal disc degeneration is present with a tiny central disc protrusion and slight left greater than right uncovertebral hypertrophy. No significant spinal canal or foraminal stenosis results.  C4-C5: Mild disc degeneration is present with a tiny central disc protrusion. Slight bilateral uncovertebral hypertrophy and facet arthrosis is noted. A right-sided perineural cyst is present. There is no significant spinal canal or foraminal  encroachment.  C5-C6: Posterior disc-osteophyte complex formation is apparent with central and broad-based left paracentral/left foraminal protrusion.  Left less than right uncovertebral hypertrophy is demonstrated. There is concurrent facet arthropathy. These findings  contribute to ventral thecal sac effacement with moderate right worse than left foraminal narrowing.  C6-C7: Posterior disc osteophyte complex formation is demonstrated with superimposed central disc protrusion. Left greater than right uncovertebral hypertrophy is present. Bilateral vertebral hypertrophy is apparent. These findings contribute to mild  spinal canal stenosis and mild bilateral foraminal impingement.  C7-T1: Mild bilateral uncovertebral hypertrophy is suggested. There is no significant spinal canal or foraminal stenosis.                  Impression   CONCLUSION:  1. Degenerative disc disease, particular at C6-C7, where there is mild spinal canal and mild bilateral foraminal narrowing, and C5-C6, where there is moderate foraminal compromise.       2. Postoperative changes of Chiari malformation decompression with suboccipital craniectomy and posterior C1 laminectomy.     3. No abnormal intrinsic cord signal intensity.       4. No acute osseous injury of the cervical spine is appreciated.       5. Lesser incidental findings as above.           Dictated by (CST): Dwight Talley MD on 3/25/2025 at 6:25 AM      Finalized by (CST): Dwight Talley MD on 3/25/2025 at 6:33 AM           Result History    MRI SPINE CERVICAL (CPT=72141) (Order #[id number]) on 3/25/2025 - Order Result History Report  Signed by    Signed Time Phone Pager   Dwight Talley MD 3/25/2025 06:33 [phone]        MRI SPINE CERVICAL (CPT=72141): Result Notes     Angelic Rivera RN  3/26/2025  3:12 PM CDT       Written by Emanuel Whatley MD on 3/25/2025 11:55 PM CDT  Seen by patient Tricia Danielson on 3/26/2025 11:20 AM     Vanessa Murphy CMA  3/26/2025  1:31 PM CDT       RN please assist .    Emanuel Whatley MD  3/25/2025 11:55 PM CDT       Send letter and copy of test result.  Impression  CONCLUSION:  1. Degenerative  disc disease, particular at C6-C7, where there is mild spinal canal and mild bilateral foraminal narrowing, and C5-C6, where there is moderate foraminal compromise.       2. Postoperative changes of Chiari malformation decompression with suboccipital craniectomy and posterior C1 laminectomy.     3. No abnormal intrinsic cord signal intensity.       4. No acute osseous injury of the cervical spine is appreciated.       5. Lesser incidental findings as above.           Dictated by (CST): Dwight Talley MD on 3/25/2025 at 6:25 AM      Finalized by (CST): Dwight Talley MD on 3/25/2025 at 6:33 AM      Follow up with physiatry  dx spinal stenosis  moderated C5 to 6Referred to Provider Information:  Provider Address Phone  Behar, MD Efrain [address] [place], [place] [id number] [phone]    Other location  [facility] [address] [facility]      Assessment & Plan:   (M50.30) Bulging of cervical intervertebral disc  (primary encounter diagnosis)  Plan: Physical Therapy Referral - [facility]        Follow up with physiatry  Will star flexeril as needed  side effect discussed sleepiness  Start physical therapy    (G93.5) Chiari I malformation (HCC)  Plan: treated    Patient voiced understanding  and agrees with plan    No orders of the defined types were placed in this encounter.      Meds This Visit:  Requested Prescriptions     Signed Prescriptions Disp Refills    cyclobenzaprine 5 MG Oral Tab 30 tablet 0     Sig: Take 1 tablet (5 mg total) by mouth 2 (two) times daily as needed for Muscle spasms.       Imaging & Referrals:  PHYSICAL THERAPY - INTERNAL            [1]   Current Outpatient Medications   Medication Sig Dispense Refill    cyclobenzaprine 5 MG Oral Tab Take 1 tablet (5 mg total) by mouth 2 (two) times daily as needed for Muscle spasms. 30 tablet 0    ARMOUR THYROID 15 MG Oral Tab TAKE FOUR TABLETS BY MOUTH DAILY 120 tablet 0    Turmeric (QC TUMERIC COMPLEX OR) Take 1 tablet by mouth daily.      Multiple  Vitamins-Minerals (MULTI-VITAMIN/MINERALS) Oral Tab Take 1 tablet by mouth daily.      Probiotic Product (PROBIOTIC DAILY OR) Take by mouth daily.     [2] No Known Allergies  [3]   Past Medical History:   Allergic rhinitis    Allergy    Back problem    C2-3 mild central, C4-5 left mild foraminal, C5-6 right mod foraminal & left mild-mod, C6-7 mild-mod diffuse bulging discs    C3-4 small central, C4-5 small central herniated discs    C5-6 right mod foraminal stenosis    Constipation    Diverticular disease    Family history of basal cell carcinoma    History of atypical nevus    Hyperglycemia    L3-4 grade 1 spondylolisthesis    L3-4 mild-mod diffuse bulging disc    L4-5 mild-mod central bulging disc with annular tear    L4-5 mild-mod central stenosis    L5-S1 left mild-mod, L3-4 left mild foraminal stenosis    L5-S1 left paracentral mild-mod herniated disc    left C7 radiculitis    left L5-S1 radiculitis    Seizure disorder (HCC)    Seizures (HCC)    Stroke (HCC)    Thyroid disorder   [4]   Past Surgical History:  Procedure Laterality Date    Arthroscopy of joint unlisted      Knee surgery      Shanda biopsy stereo nodule 1 site right (cpt=19081)  01/05/2017    benign    Other surgical history  2005    Brain and neck    Other surgical history Left 1983    Knee    Other surgical history  1985    Devated septum    Spinal surgery - dmg     [5]   Family History  Problem Relation Age of Onset    Cancer Father         Stomach cancer    Hypertension Mother     Cancer Mother    [6]   Social History  Socioeconomic History    Marital status:    Tobacco Use    Smoking status: Never     Passive exposure: Never    Smokeless tobacco: Never   Vaping Use    Vaping status: Never Used   Substance and Sexual Activity    Alcohol use: Not Currently     Comment: Beer and Wine, Occasionally;     Drug use: No   Other Topics Concern    Caffeine Concern Yes     Comment: Soda, Coffee, 2 cups;     Exercise Yes     Comment: stretching and  hep    History of tanning No    Pt has a pacemaker No    Pt has a defibrillator No    Reaction to local anesthetic No    Left Handed No    Right Handed Yes    Currently spends a great deal of time in the sun No    Hx of Spending Great Deal of Time in Sun Yes    Bad sunburns in the past Yes     Comment: once    Tanning Salons in the Past No    Hx of Radiation Treatments No   Social History Narrative    The patient does not use an assistive device..      The patient does live in a home with stairs.     Social Drivers of Health     Food Insecurity: No Food Insecurity (4/8/2025)    NCSS - Food Insecurity     Worried About Running Out of Food in the Last Year: No     Ran Out of Food in the Last Year: No   Transportation Needs: No Transportation Needs (4/8/2025)    NCSS - Transportation     Lack of Transportation: No   Housing Stability: Not At Risk (4/8/2025)    NCSS - Housing/Utilities     Has Housing: Yes     Worried About Losing Housing: No     Unable to Get Utilities: No

## 2025-05-03 ENCOUNTER — LAB ENCOUNTER (OUTPATIENT)
Dept: LAB | Age: 60
End: 2025-05-03
Attending: PHYSICIAN ASSISTANT
Payer: COMMERCIAL

## 2025-05-03 DIAGNOSIS — E03.9 HYPOTHYROIDISM, UNSPECIFIED TYPE: ICD-10-CM

## 2025-05-03 DIAGNOSIS — E55.9 VITAMIN D DEFICIENCY: ICD-10-CM

## 2025-05-03 LAB
T3FREE SERPL-MCNC: 2.93 PG/ML (ref 2.4–4.2)
T4 FREE SERPL-MCNC: 0.9 NG/DL (ref 0.8–1.7)
TSI SER-ACNC: 0.45 UIU/ML (ref 0.55–4.78)
VIT D+METAB SERPL-MCNC: 38.3 NG/ML (ref 30–100)

## 2025-05-03 PROCEDURE — 84482 T3 REVERSE: CPT | Performed by: PHYSICIAN ASSISTANT

## 2025-05-03 PROCEDURE — 84439 ASSAY OF FREE THYROXINE: CPT | Performed by: PHYSICIAN ASSISTANT

## 2025-05-03 PROCEDURE — 84443 ASSAY THYROID STIM HORMONE: CPT | Performed by: PHYSICIAN ASSISTANT

## 2025-05-03 PROCEDURE — 82306 VITAMIN D 25 HYDROXY: CPT | Performed by: PHYSICIAN ASSISTANT

## 2025-05-03 PROCEDURE — 84481 FREE ASSAY (FT-3): CPT | Performed by: PHYSICIAN ASSISTANT

## 2025-05-08 ENCOUNTER — HOSPITAL ENCOUNTER (OUTPATIENT)
Dept: MAMMOGRAPHY | Facility: HOSPITAL | Age: 60
Discharge: HOME | End: 2025-05-08
Attending: ADVANCED PRACTICE MIDWIFE
Payer: COMMERCIAL

## 2025-05-08 DIAGNOSIS — Z12.31 ENCOUNTER FOR SCREENING MAMMOGRAM FOR BREAST CANCER: ICD-10-CM

## 2025-05-08 PROCEDURE — 77067 SCR MAMMO BI INCL CAD: CPT | Performed by: ADVANCED PRACTICE MIDWIFE

## 2025-05-08 PROCEDURE — 77063 BREAST TOMOSYNTHESIS BI: CPT | Performed by: ADVANCED PRACTICE MIDWIFE

## 2025-05-13 ENCOUNTER — OFFICE VISIT (OUTPATIENT)
Dept: INTEGRATIVE MEDICINE | Facility: CLINIC | Age: 60
End: 2025-05-13
Payer: COMMERCIAL

## 2025-05-13 VITALS
WEIGHT: 113.38 LBS | SYSTOLIC BLOOD PRESSURE: 124 MMHG | BODY MASS INDEX: 19.36 KG/M2 | HEIGHT: 64 IN | DIASTOLIC BLOOD PRESSURE: 72 MMHG | HEART RATE: 86 BPM | OXYGEN SATURATION: 98 %

## 2025-05-13 DIAGNOSIS — R68.89 COLD INTOLERANCE: ICD-10-CM

## 2025-05-13 DIAGNOSIS — R53.83 OTHER FATIGUE: ICD-10-CM

## 2025-05-13 DIAGNOSIS — E55.9 VITAMIN D DEFICIENCY: ICD-10-CM

## 2025-05-13 DIAGNOSIS — M81.8 OTHER OSTEOPOROSIS, UNSPECIFIED PATHOLOGICAL FRACTURE PRESENCE: ICD-10-CM

## 2025-05-13 DIAGNOSIS — E61.1 LOW IRON: ICD-10-CM

## 2025-05-13 DIAGNOSIS — K59.00 CONSTIPATION, UNSPECIFIED CONSTIPATION TYPE: ICD-10-CM

## 2025-05-13 DIAGNOSIS — R14.0 ABDOMINAL BLOATING: ICD-10-CM

## 2025-05-13 DIAGNOSIS — E03.9 HYPOTHYROIDISM, UNSPECIFIED TYPE: Primary | ICD-10-CM

## 2025-05-13 DIAGNOSIS — L67.8 BRITTLE HAIR: ICD-10-CM

## 2025-05-13 PROCEDURE — 99215 OFFICE O/P EST HI 40 MIN: CPT | Performed by: PHYSICIAN ASSISTANT

## 2025-05-13 PROCEDURE — 3074F SYST BP LT 130 MM HG: CPT | Performed by: PHYSICIAN ASSISTANT

## 2025-05-13 PROCEDURE — 3008F BODY MASS INDEX DOCD: CPT | Performed by: PHYSICIAN ASSISTANT

## 2025-05-13 PROCEDURE — G2211 COMPLEX E/M VISIT ADD ON: HCPCS | Performed by: PHYSICIAN ASSISTANT

## 2025-05-13 PROCEDURE — 3078F DIAST BP <80 MM HG: CPT | Performed by: PHYSICIAN ASSISTANT

## 2025-05-13 RX ORDER — LEVOTHYROXINE, LIOTHYRONINE 38; 9 UG/1; UG/1
60 TABLET ORAL DAILY
Qty: 30 TABLET | Refills: 5 | Status: SHIPPED | OUTPATIENT
Start: 2025-05-13

## 2025-05-13 NOTE — PATIENT INSTRUCTIONS
Fosteum Plus (available from [facility])    Fosteum plus is a prescription medical food developed from natural ingredients.  It slows down the cells that breakdown bone and stimulates the production of more cells and there are activities that build bone.    Although uncommon, side effects may include minor nausea or upset stomach.  If this happens, you may take it with food.    Start with 1 capsule daily. If tolerated, increase to 1 capsule twice daily. If effective and you would like us to push a RX through to the mail order pharmacy (90 day supply), then please contact us through messaging or give us a call and we'll be happy to take care of that for you.      2. Switch thyroid medications to NP Thyroid  3. Get blood drawn 6 weeks after that  4. Atrantíl to help reduce the bacteria causing the bloating:      Atrantíl is a new nutraceutical that relieves bloating and abdominal discomfort (with or without constipation or diarrhea) caused by gas in the gut. Developed by a gastroenterologist, Atrantíl works differently than probiotics or any other remedy. It attacks problem bacteria and the gas they produce at the source. Atrantíl’s three natural botanicals work together to produce long-term relief for these difficult-to-treat digestive symptoms while promoting a healthy gut    Dose: Take two capsules a day up to three times a day with food. Continue until you feel relief      5.  Protein Powder Options:  Pure Lean Protein by Pure Encapsulations (on [employer] website)  Just Ingredients (Find at [employer] or online)    IsoPure ([employer] or online)   Metabolic Maintenance Detox (on [employer] website)  UltraClear Renew (on [employer] website)    Ready to grab Shakes:  Adalbertoain Shakes ([employer] or [employer])  FitVia Nutrition Shakes (online)    Bars: GoMacro, Edwards ([employer], [employer], or  [employer])  *Try until you find the flavors you like best

## 2025-05-14 ENCOUNTER — HOSPITAL ENCOUNTER (OUTPATIENT)
Dept: MAMMOGRAPHY | Facility: HOSPITAL | Age: 60
Discharge: HOME | End: 2025-05-14
Attending: ADVANCED PRACTICE MIDWIFE
Payer: COMMERCIAL

## 2025-05-14 ENCOUNTER — HOSPITAL ENCOUNTER (OUTPATIENT)
Dept: ULTRASOUND IMAGING | Facility: HOSPITAL | Age: 60
Discharge: HOME | End: 2025-05-14
Attending: ADVANCED PRACTICE MIDWIFE
Payer: COMMERCIAL

## 2025-05-14 DIAGNOSIS — R92.8 ABNORMAL MAMMOGRAM: ICD-10-CM

## 2025-05-14 PROCEDURE — 77061 BREAST TOMOSYNTHESIS UNI: CPT | Performed by: ADVANCED PRACTICE MIDWIFE

## 2025-05-14 PROCEDURE — 77065 DX MAMMO INCL CAD UNI: CPT | Performed by: ADVANCED PRACTICE MIDWIFE

## 2025-05-14 PROCEDURE — 76642 ULTRASOUND BREAST LIMITED: CPT | Performed by: ADVANCED PRACTICE MIDWIFE

## 2025-05-15 LAB — REVERSE T3: 10.9 NG/DL

## 2025-06-09 ENCOUNTER — TELEPHONE (OUTPATIENT)
Dept: OBGYN CLINIC | Facility: CLINIC | Age: 60
End: 2025-06-09

## 2025-06-09 DIAGNOSIS — R92.343 EXTREMELY DENSE TISSUE OF BOTH BREASTS ON MAMMOGRAPHY: Primary | ICD-10-CM

## 2025-06-09 NOTE — TELEPHONE ENCOUNTER
LMTCB    Yamileth Serra, TIMI  [facility] Clinical Pool  Please notify pt of normal mammogram. She does have dense breasts which   reduces mammographic sensitivity for breast cancer. Screening whole breast ultrasound or MRI, used as an adjunct to mammography, can detect breast cancers that might be obscured by dense breast  tissue on mammography. Please inform patient that I ordered an MRI and she should check her insurance coverage first

## 2025-06-09 NOTE — TELEPHONE ENCOUNTER
Pt verified name and .     Informed pt of normal mammogram. Informed pt of dense breast tissue noted on mammogram and CNM recommendation for follow up breast MRI. Advised pt to check insurance coverage for breast MRI prior to scheduling. Pt verbalized understanding and agreed. [employer] message sent to pt with CPT code to check insurance coverage.

## 2025-07-02 ENCOUNTER — LAB ENCOUNTER (OUTPATIENT)
Dept: LAB | Age: 60
End: 2025-07-02
Attending: PHYSICIAN ASSISTANT
Payer: COMMERCIAL

## 2025-07-02 DIAGNOSIS — E03.9 HYPOTHYROIDISM, UNSPECIFIED TYPE: ICD-10-CM

## 2025-07-02 DIAGNOSIS — E61.1 LOW IRON: ICD-10-CM

## 2025-07-02 LAB
DEPRECATED HBV CORE AB SER IA-ACNC: 77 NG/ML (ref 50–306)
IRON SATN MFR SERPL: 34 % (ref 15–50)
IRON SERPL-MCNC: 109 UG/DL (ref 50–170)
T3FREE SERPL-MCNC: 2.67 PG/ML (ref 2.4–4.2)
T4 FREE SERPL-MCNC: 1 NG/DL (ref 0.8–1.7)
TOTAL IRON BINDING CAPACITY: 322 UG/DL (ref 250–425)
TRANSFERRIN SERPL-MCNC: 271 MG/DL (ref 250–380)
TSI SER-ACNC: 0.2 UIU/ML (ref 0.55–4.78)

## 2025-07-02 PROCEDURE — 84439 ASSAY OF FREE THYROXINE: CPT

## 2025-07-02 PROCEDURE — 84481 FREE ASSAY (FT-3): CPT

## 2025-07-02 PROCEDURE — 84466 ASSAY OF TRANSFERRIN: CPT

## 2025-07-02 PROCEDURE — 84443 ASSAY THYROID STIM HORMONE: CPT

## 2025-07-02 PROCEDURE — 82728 ASSAY OF FERRITIN: CPT

## 2025-07-02 PROCEDURE — 36415 COLL VENOUS BLD VENIPUNCTURE: CPT

## 2025-07-02 PROCEDURE — 83540 ASSAY OF IRON: CPT

## 2025-07-02 PROCEDURE — 84482 T3 REVERSE: CPT

## 2025-07-05 LAB — REVERSE T3: 13.1 NG/DL

## 2025-07-10 ENCOUNTER — RESULTS FOLLOW-UP (OUTPATIENT)
Dept: INTEGRATIVE MEDICINE | Facility: CLINIC | Age: 60
End: 2025-07-10

## 2025-07-10 ENCOUNTER — OFFICE VISIT (OUTPATIENT)
Dept: PHYSICAL MEDICINE AND REHAB | Facility: CLINIC | Age: 60
End: 2025-07-10
Payer: COMMERCIAL

## 2025-07-10 VITALS
RESPIRATION RATE: 18 BRPM | WEIGHT: 113 LBS | BODY MASS INDEX: 19.29 KG/M2 | DIASTOLIC BLOOD PRESSURE: 75 MMHG | HEART RATE: 74 BPM | OXYGEN SATURATION: 99 % | HEIGHT: 64 IN | SYSTOLIC BLOOD PRESSURE: 128 MMHG

## 2025-07-10 DIAGNOSIS — M50.20 CERVICAL HERNIATED DISC: ICD-10-CM

## 2025-07-10 DIAGNOSIS — M51.369 ANNULAR TEAR OF LUMBAR DISC: ICD-10-CM

## 2025-07-10 DIAGNOSIS — M51.26 LUMBAR HERNIATED DISC: ICD-10-CM

## 2025-07-10 DIAGNOSIS — M48.061 SPINAL STENOSIS OF LUMBAR REGION WITHOUT NEUROGENIC CLAUDICATION: ICD-10-CM

## 2025-07-10 DIAGNOSIS — M96.1 CERVICAL POST-LAMINECTOMY SYNDROME: ICD-10-CM

## 2025-07-10 DIAGNOSIS — M50.90 CERVICAL DISC DISEASE: ICD-10-CM

## 2025-07-10 DIAGNOSIS — M54.12 CERVICAL RADICULITIS: Primary | ICD-10-CM

## 2025-07-10 PROBLEM — M48.02 FORAMINAL STENOSIS OF CERVICAL REGION: Status: RESOLVED | Noted: 2019-01-31 | Resolved: 2025-07-10

## 2025-07-10 NOTE — PROGRESS NOTES
Chief Complaint:    Chief Complaint   Patient presents with    Follow - Up     LOV 04/26/19 Patient is here to reestablish care and has pain in the upper back and left arm. Patient states the pain had subsided, but flare up began at the end of February. Patient states the pain was an aching, throbbing and pins and needles in the upper back and arms, Pain 9/10. Imaging completed 03/20/25. Admits N/T.Admits Weakness. Denies PT. Admits antiinflammatory patches taken PRN.        Cervical Pain H & P    HPI:  Tricia Danielson is a 60 year old year old.  She is right handed.  History of Present Illness  Ms. Tricia Danielson is a 60 year old female with a history of Chiari decompression who presents with mid back pain and left arm nerve pain.    In mid-February, she developed pain in her left elbow and arm, described as nerve pain with a burning sensation radiating to her third and fourth fingers. The pain was constant and reminiscent of previous symptoms related to a syrinx in her spine. It persisted through April, reaching a severity of 8 to 9 out of 10, before gradually improving.    She was told by her doctor that her MRI showed some stenosis and issues with bulging discs. Over-the-counter anti-inflammatory patches provided relief and allowed her to continue exercising. She resumed exercises from previous physical therapy, which helped alleviate symptoms.    She experiences ongoing stiffness in her neck since her Chiari decompression surgery, with occasional tingling and weakness in her left arm. The tingling fluctuates in location and is tolerable. Pain does not vary with time of day but is exacerbated by external stimuli like a fan blowing on her arm.    Her pain is mostly a burning sensation, with shooting pain primarily affecting her fingers and upper arm. Stretching exercises and using a super ball to massage her shoulder blade area provide relief. She manages her lower back pain with exercises learned from past  physical therapy sessions.    No numbness or tingling in her arms or hands, except for occasional tingling in her left arm. No weakness, except for some residual weakness in the left arm. No pain with neck movements, but stiffness when looking down and to the left. Sensitivity to a fan blowing on her left arm causes shooting pain.      Description of the Pain  The pain is located in the left mid back pain.    The pain radiates to left shoulder, left upper lateral arm, left posterior forearm, left middle finger, and left ring finger.  The pain at its best is 0/10. The pain at its worst is 9/10. The pain is currently  1/10.  The pain is described as a(n) burning  and shooting sensation.    The patient reports no numbness.  The patient reports tingling in the left shoulder, left upper lateral arm, left posterior forearm, left middle finger, and left ring finger.  This is intermittent and not in the same location.  There is weakness in left arms.  The pain is worse when a fan blows on her left arm and sitting.   The pain is better with the stretching and strengthening.    Past Medical History   Past Medical History[1]    Past Surgical History   Past Surgical History[2]    Family History   Family History[3]    Social History   Social History     Socioeconomic History    Marital status:      Spouse name: Not on file    Number of children: Not on file    Years of education: Not on file    Highest education level: Not on file   Occupational History    Not on file   Tobacco Use    Smoking status: Never     Passive exposure: Never    Smokeless tobacco: Never   Vaping Use    Vaping status: Never Used   Substance and Sexual Activity    Alcohol use: Not Currently     Comment: Beer and Wine, Occasionally;     Drug use: No    Sexual activity: Not on file   Other Topics Concern     Service Not Asked    Blood Transfusions Not Asked    Caffeine Concern Yes     Comment: Soda, Coffee, 2 cups;     Occupational Exposure  Not Asked    Hobby Hazards Not Asked    Sleep Concern Not Asked    Stress Concern Not Asked    Weight Concern Not Asked    Special Diet Not Asked    Back Care Not Asked    Exercise Yes     Comment: stretching and hep    Bike Helmet Not Asked    Seat Belt Not Asked    Self-Exams Not Asked    Grew up on a farm Not Asked    History of tanning No    Outdoor occupation Not Asked    Pt has a pacemaker No    Pt has a defibrillator No    Breast feeding Not Asked    Reaction to local anesthetic No    Left Handed No    Right Handed Yes    Currently spends a great deal of time in the sun No    Past Sunlamp Treatments for Acne Not Asked    Hx of Spending Great Deal of Time in Sun Yes    Bad sunburns in the past Yes     Comment: once    Tanning Salons in the Past No    Hx of Radiation Treatments No    Regular use of sun block Not Asked   Social History Narrative    The patient does not use an assistive device..      The patient does live in a home with stairs.     Social Drivers of Health     Food Insecurity: No Food Insecurity (4/8/2025)    NCSS - Food Insecurity     Worried About Running Out of Food in the Last Year: No     Ran Out of Food in the Last Year: No   Transportation Needs: No Transportation Needs (4/8/2025)    NCSS - Transportation     Lack of Transportation: No   Stress: Not on file   Housing Stability: Not At Risk (4/8/2025)    NCSS - Housing/Utilities     Has Housing: Yes     Worried About Losing Housing: No     Unable to Get Utilities: No       Review of Systems  Review of Systems   Constitutional: Negative.    HENT: Negative.     Eyes: Negative.    Respiratory: Negative.     Cardiovascular: Negative.    Gastrointestinal: Negative.    Genitourinary: Negative.    Musculoskeletal: Negative.    Skin: Negative.    Neurological: Negative.    Endo/Heme/Allergies: Negative.    Psychiatric/Behavioral: Negative.     All other systems reviewed and are negative.          PE:  The patient does appear in her stated age in no  distress.  The patient is well groomed.    Psychiatric:  The patient is alert and oriented x 3.  The patient has a normal affect and mood.      Respiratory:  No acute respiratory distress. Patient does not have a cough.    HEENT:  Extraocular muscles are intact. There is no kern icterus. Pupils are equal, round, and reactive to light. No redness or discharge bilaterally.    Skin:  There are no rashes or lesions.    Lymph Nodes:  The patient has no palpable submandibular, supraclavicular, and cervical lymph nodes..    Vitals:  There were no vitals filed for this visit.    Cervical Spine:    Posture: mild-moderate chin forward superiorly rotated protracted shoulder posture.   Shoulders: Level   Head: In neutral   Spinous Processes Palpations: Non-tender for all Spinous Processes   Z-Joints Palpations: Non-tender for all Z-joints   Muscular Palpations: Non-tender to palpation.   Cervical Flexion: 45 degrees Painless, but left neck stiffness   Cervical Extension: 30 degrees Painless   RIGHT rotation: 80 degrees Painless   LEFT rotation: 80 degrees Painless, but left neck stiffness     Vascular upper extremity:   Right radial pulses: 2+   Left radial pulses: 2+     Neurological Upper Extremity:    Light Touch: Intact in Bilateral UE except:  Increased in the left shoulder, left upper lateral arm, left upper posterior arm, left lateral forearm, and left medial forearm.   Pin Prick: Not tested.   UE Muscle Strength: All Upper Extremity strength measurements 5/5 except:  Serratus anterior Left: 4+/5   Reflexes: 2+ In the bilateral upper extremities.   Santiago's sign Right: Negative   Santiago's sigh Left: Negative     Shoulder: The shoulders are stable.    Medial Border Scapular Winging: absent   Right Scapula: normal alignment   Left Scapula: normal alignment   Palpation: Non-tender shoulder palpations.   Right Internal Rotation: normal   Left Internal Rotation: normal   Right External Rotation: normal   Left External  Rotation: normal   Shoulder Special Tests: Right and left Laboy test negative.     Gait:    Gait: Normal gait   Sit to Stand: no difficulty     Lumbar Spine:    Scoliosis: Thoracic levoscoliosis that is mild and Lumbar dextroscoliosis that is mild     Lumbar Spine Palpation:    Spinous Processes: Non-tender for all Spinous Processes     Radiology Imaging:  I reviewed with the patient her MRI of the cervical spine from 3/20/2025.      Assessment  1. left C7 radiculitis    2. C4-5 mild difffuse, C5-6 mild-mod diffuse, C6-7 mild-mod diffuse bulging discs    3. C3-4 small central, C4-5 small central, C5-6 left mild formainal herniated discs    4. Cervical post-laminectomy syndrome for Chairi    5. L5-S1 left paracentral mild-mod herniated disc    6. L4-5 mild-mod central bulging disc with annular tear    7. L4-5 mild-mod central stenosis         Plan  Assessment & Plan  Cervical disc disorder with radiculopathy  Chronic cervical disc disorder with radiculopathy, previously severe pain now improved with ibuprofen patches and exercises. MRI showed C6-C7 stenosis and disc bulging without nerve compression. Symptoms reminiscent of past syrinx issues, improved post-surgery. Occasional tingling and weakness in left arm, managed with exercises.  - Prescribe 2-4 sessions of physical therapy for upper body exercises.  - Continue ibuprofen patches as needed.  - Encourage continuation of cervical and upper body exercises at home.  - Advise to contact if symptoms worsen.    Neck stiffness  Persistent neck stiffness, especially when looking up and to the left, improved with exercises post-surgery.  - Include neck exercises in physical therapy sessions.    Chiari malformation  Post-Chiari decompression surgery with significant symptom improvement. Occasional tingling and weakness in left arm, MRI shows no nerve compression.    Orders Placed This Encounter   Procedures    PHYSICAL THERAPY - INTERNAL     left C7 radiculitis  (primary  encounter diagnosis)  C4-5 mild difffuse, C5-6 mild-mod diffuse, C6-7 mild-mod diffuse bulging discs  C3-4 small central, C4-5 small central, C5-6 left mild formainal herniated discs  Cervical post-laminectomy syndrome for Chairi  L5-S1 left paracentral mild-mod herniated disc  L4-5 mild-mod central bulging disc with annular tear  L4-5 mild-mod central stenosis    1-2 times per week for 4 weeks.  Cervical stabilization with neural mobilization.  Scapular mobilization and stabilization.  HEP     Referral Priority:   Routine     Referral Type:   Rehab Services     Requested Specialty:   Physical Therapy    PHYSICAL THERAPY - INTERNAL     left C7 radiculitis  (primary encounter diagnosis)  C4-5 mild difffuse, C5-6 mild-mod diffuse, C6-7 mild-mod diffuse bulging discs  C3-4 small central, C4-5 small central, C5-6 left mild formainal herniated discs  Cervical post-laminectomy syndrome for Chairi  L5-S1 left paracentral mild-mod herniated disc  L4-5 mild-mod central bulging disc with annular tear  L4-5 mild-mod central stenosis    1-4 sessions  Lumbar stabilization with neural mobilization.  HEP     Referral Priority:   Routine     Referral Type:   Rehab Services     Requested Specialty:   Physical Therapy      Return in about 3 months (around 10/10/2025) for assessment after therapy.     Patient should call prior to next visit if new or worsening symptoms.     The patient understands and agrees with the stated plan.    Leonel Clifford MD  7/10/2025           [1]   Past Medical History:   Allergic rhinitis    Allergy    Back problem    C2-3 mild central, C4-5 left mild foraminal, C5-6 right mod foraminal & left mild-mod, C6-7 mild-mod diffuse bulging discs    C3-4 small central, C4-5 small central herniated discs    C5-6 right mod foraminal stenosis    Constipation    Diverticular disease    Family history of basal cell carcinoma    History of atypical nevus    Hyperglycemia    L3-4 grade 1 spondylolisthesis    L3-4  mild-mod diffuse bulging disc    L4-5 mild-mod central bulging disc with annular tear    L4-5 mild-mod central stenosis    L5-S1 left mild-mod, L3-4 left mild foraminal stenosis    L5-S1 left paracentral mild-mod herniated disc    left C7 radiculitis    left L5-S1 radiculitis    Seizure disorder (HCC)    Seizures (HCC)    Stroke (HCC)    Thyroid disorder   [2]   Past Surgical History:  Procedure Laterality Date    Arthroscopy of joint unlisted      Knee surgery      Shanda biopsy stereo nodule 1 site right (cpt=19081)  01/05/2017    benign    Other surgical history  2005    Brain and neck    Other surgical history Left 1983    Knee    Other surgical history  1985    Devated septum    Spinal surgery - dmg     [3]   Family History  Problem Relation Age of Onset    Cancer Father         Stomach cancer    Hypertension Mother     Cancer Mother

## (undated) DIAGNOSIS — K21.9 GASTROESOPHAGEAL REFLUX DISEASE, UNSPECIFIED WHETHER ESOPHAGITIS PRESENT: ICD-10-CM

## (undated) DIAGNOSIS — Z12.11 SCREEN FOR COLON CANCER: ICD-10-CM

## (undated) DIAGNOSIS — R14.0 BLOATING: ICD-10-CM

## (undated) DIAGNOSIS — Z80.0 FAMILY HISTORY OF GASTRIC CANCER: Primary | ICD-10-CM

## (undated) DEVICE — CHLORAPREP ORANGE TINT 10.5ML

## (undated) DEVICE — SET XTN .1IN 2.7ML 20IN IV

## (undated) DEVICE — STANDARD HYPODERMIC NEEDLE,POLYPROPYLENE HUB: Brand: MONOJECT

## (undated) DEVICE — 6 ML SYRINGE LUER-LOCK TIP: Brand: MONOJECT

## (undated) DEVICE — SYRINGE MNJCT 10ML LF STRL REG

## (undated) DEVICE — OMNIPAQUE 240ML VIAL

## (undated) DEVICE — NEEDLE SPINAL 22X3-1/2 BLK

## (undated) DEVICE — STERILE POLYISOPRENE POWDER-FREE SURGICAL GLOVES: Brand: PROTEXIS

## (undated) DEVICE — TOWEL OR BLU 16X26 STRL

## (undated) DEVICE — NEEDLE 18G 1-1/2 BLUNT FILL

## (undated) DEVICE — 12 ML SYRINGE LUER-LOCK TIP: Brand: MONOJECT

## (undated) NOTE — MR AVS SNAPSHOT
[address] [facility] [address] [address]  [phone]  [phone]               Thank you for choosing us for your health care visit with Sebastián Davis PT.   We are glad to serve you and happy to provide you with t Please arrive at your scheduled appointment time. Wear comfortable, loose fitting clothing.             Jul 17, 2017  2:15 PM   [facility] Visit By Therapist with Israel Brady, [address] [place] [address]

## (undated) NOTE — LETTER
[facility] SURGERY SCHEDULING FORM   [address] [place] [address], [place]   [phone] (scheduling phone) [phone] (scheduling fax)     PATIENT INFORMATION   Last Name:      Suni Tuttle Name:    Aga Carnes Completed by:    Lisa CORDOVA      Date:   1/10/2019

## (undated) NOTE — ED AVS SNAPSHOT
[facility] AND [facility] Immediate Care in [address] [facility]  [address] [place] [address]    Phone:  [phone]    Fax:  [phone] [facility]   MRN: [id number]    Department:  [facility] AND [facility] Immediate Care in [address] [place] [address]   Date of Visit: Take 1 tablet (500 mg total) by mouth 2 (two) times daily as needed.             Where to Get Your Medications      You can get these medications from any pharmacy     Bring a paper prescription for each of these medications    - diazepam 5 MG Tabs  - lidoc primary care or a specialist physician for a follow-up visit, please tell this physician (or your personal doctor if your instructions are to return to your personal doctor) about any new or lasting problems.  The primary care or specialist physician may se [address] [facility] [address]) [phone]   [facility] [employer] ([address]) [address] [facility] [employer] ([address] [place] [address] Avenue,4Th Floor) [facility] [address]  [place]

## (undated) NOTE — ED AVS SNAPSHOT
[facility] Emergency Department    [address]     [place] [address]    Phone:  [phone]    Fax:  [phone] [facility]   MRN: [id number]    Department:  [facility] Emergency Department   Date of Visit:  6/ and Class Registration line at [phone] or find a doctor online by visiting [email or web address].    IF THERE IS ANY CHANGE OR WORSENING OF YOUR CONDITION, CALL YOUR PRIMARY CARE PHYSICIAN AT ONCE OR RETURN IMMEDIATELY TO [address] [place] [address].     If

## (undated) NOTE — LETTER
[facility] SURGERY SCHEDULING FORM   [address]  [address] [facility]   [phone] (scheduling phone) [phone] (scheduling fax)     PATIENT INFORMATION   Patient Name:    Edward Turner   :    3/17/1965 Completed by:    Sarthak Every      Date:    11/1/2017    [facility] will follow its Pre-Admission Assessment and Screening Policy for pre-admission testing.   Physicians that require   additional testing must order this directly and have results faxed to [facility] at

## (undated) NOTE — LETTER
12/15/2023              [address]        [facility] [address]         Dear Amna Mendoza,    Our records indicate that the tests ordered for you by Anne Puente CNM  have not been done. If you have, in fact, already completed the tests or you do not wish to have the tests done, please contact our office at [address] [place] [address]. Otherwise, please proceed with the testing. Enclosed is a duplicate order for your convenience.         Sincerely,    Anne Puente CNM  [facility], [facility], [facility] [address]  [phone]

## (undated) NOTE — LETTER
AUTHORIZATION FOR SURGICAL OPERATION OR OTHER PROCEDURE    1.  I hereby authorize Dr. Leobardo Owens and the [facility] staff assigned to my case to perform the following operation and/or procedure at the [facility]:    ________________In office Caudal injection u Time:  ________ A. M.  P.M.        Patient Name:  ______________________________________________________  (please print)       Patient signature:  ___________________________________________________             Relationship to Patient:

## (undated) NOTE — MR AVS SNAPSHOT
[address]  [address] [facility]  [phone]               Thank you for choosing us for your health care visit with Lukas Noriega MD.  We are glad to serve you and happy to provide you with this summar bedtime. Sleeping on a heating pad can lead to skin burns or tissue damage. · You can alternate ice and heat therapies.   Medications  Talk to your healthcare provider before using medicines, especially if you have other medical problems or are taking othe When driving, sit up straight.  Adjust the seat forward so you are not leaning toward the steering wheel.  A small pillow or rolled towel behind your lower back may help if you are driving long distances.   Standing  When standing for long periods, shift mo people feel better in 1 to 2 weeks, and most of the rest in 1 to 2 months. Most people can remain active. People experience and describe pain differently; not everyone is the same. · The pain can be sharp, stabbing, shooting, aching, cramping or burning. · When in bed, try to find a position of comfort. A firm mattress is best. Try lying flat on your back with pillows under your knees. You can also try lying on your side with your knees bent up towards your chest and a pillow between your knees.   · At [facility] or gastrointestinal bleeding, or are taking blood thinners, talk to your doctor before taking any medicine. · Be careful if you are given a prescription medicines, narcotics, or medicine for muscle spasms.  They can cause drowsiness, affect your coordinati Most of the time, mechanical problems with the muscles or spine cause the pain. Mechanical problems are usually caused by an injury to the muscles or ligaments. While illness can cause back pain, it is usually not caused by a serious illness.  Mechanical pr · During the first 24 to 72 hours after an acute injury or flare up of chronic back pain, apply an ice pack to the painful area for 20 minutes and then remove it for 20 minutes. Do this over a period of 60 to 90 minutes or several times a day.  This will re · Trouble breathing  · Confusion  · Very drowsy or trouble awakening  · Fainting or loss of consciousness  · Rapid or very slow heart rate  · Loss of bowel or bladder control  When to seek medical advice  Call your healthcare provider right away if any of · Tuck your head and lift (arch) your back. · Hold for 5 seconds  · Return to starting position. · Repeat 5 times. Date Last Reviewed: 8/16/2015  © 5907-6058 The [address] [place] [address]. All rights reserved.  This [facility], [address] [place] [address],[address]., [address]     Phone:  [phone]    - methocarbamol 500 MG Tabs            MyChart     Visit MyChart  You can access your MyChart to more actively manage your health care and view more de 2 ½ hours per week – spread out over time Use a kelly to keep you motivated   Don’t forget strength training with weights and resistance Set goals and track your progress   You don’t need to join a gym. Home exercises work great.  Put more priority on exe

## (undated) NOTE — Clinical Note
Spoke with pt today for TCM--sent TE to office staff for appt clarification and f/u--thank you.  Future Appointments 2/28/2024  3:30 PM    Meggan Yañez, NITZA    EMM INT MED        [facility]

## (undated) NOTE — IP AVS SNAPSHOT
[address]  [address] [facility] ~ [phone]                After Visit Summary   1/5/2017    Soledad Vernon    MRN: [id number]           Visit Information        Provider Department    1/5/2017 11:00 AM E 8. Notify the above physician or your private physician for excessive bleeding, pain, or fever (over 100 degrees).   9. If you have any questions or concerns, please call your physician or the Radiology Nurse at [phone] from 8 am to 4 pm. Note: after 4

## (undated) NOTE — LETTER
Date & Time: 11/17/2018, 2:05 PM  Patient: Jayson Mckeon  Encounter Provider(s):    Alonso Soria MD       To Whom It May Concern:    Pooja Meeks was seen and treated in our department on 11/17/2018.  She should not return to work until 11/2

## (undated) NOTE — LETTER
[facility], [address]       08/10/18        Patient: Nael Castro   YOB: 1965   Date of Visit: 8/10/2018       Dear  Dr. Bert Cintron MD,      Thank you for referring Nael Castro to my pract

## (undated) NOTE — LETTER
7/10/2025      Leonel Clifford MD  Physical Medicine and Rehabilitation  [address] [place] [address], [address]  [facility] [address]  Dept: [phone]  Dept Fax: [phone]        RE: Consultation for Tricia Dainelson        Dear Emanuel Whatley MD,    Thank you very much for the opportunity to see your patient.  Attached please find a summary from your patient's recent visit.     I appreciate the chance to take care of your patient with you.  Please feel free to call me with any questions or concerns.    Sincerely,        Leonel Clifford MD  Electronically Signed on 7/10/2025

## (undated) NOTE — MR AVS SNAPSHOT
[facility] SPECIALTY [facility] [place] [address] [place]  [id number] [phone]               Thank you for choosing us for your health care visit with Gricelda Cortes MD.  We are glad to serve you and happy to provide you with this summary of number prior to the exam. It is   the patient's responsibility to check with and follow their insurance company's guidelines.  If your insurance company requires a prior authorization for a diagnostic test (such as MRI, MRA, CT, or Ultrasound), please do no physician's office. At that time, you will be provided with any authorization numbers or be assured that none are required. You can then schedule your appointment.  Failure to obtain required authorization numbers can create reimbursement difficulties for y Allergies as of Jun 02, 2017     No Known Allergies                Today's Vital Signs     BP Pulse Temp Height Weight BMI    143/84 mmHg 71 97.6 °F (36.4 °C) (Oral) 5' 5\" (1.651 m) 112 lb (50.803 kg) 18.64 kg/m2         Current Medications          This office, you can view your past visit information in [employer] by going to Visits < Visit Summaries. [employer] questions? Call [phone] for help. [employer] is NOT to be used for urgent needs. For medical emergencies, dial 911.         Educational Inform

## (undated) NOTE — MR AVS SNAPSHOT
Timo Hamm [address] [facility] [address] [id number]  [phone]  [phone]               Thank you for choosing us for your health care visit with Harsh Meek PT.   We are glad to serve you and happy to provide you with t Please arrive at your scheduled appointment time. Wear comfortable, loose fitting clothing.             Jul 17, 2017  2:15 PM   [facility] Visit By Therapist with Nidia Ahmadi, [address] [place] [address]

## (undated) NOTE — LETTER
Hospital Discharge Documentation    From: [address] Hospitalist's Office  Phone: [phone]    Patient discharged time/date: 6/6/2017  5:22 PM  Patient discharge disposition:  Home or Self Care  Patient has a hospital follow-up appointment with  L5-S1 left paracentral mild-mod herniated disc     L3-4 grade 1 spondylolisthesis     left L5-S1 radiculopathy     L5-S1 left mild-mod, L3-4 left mild foraminal stenosis        Physical Exam:     Gen: No acute distress, alert and oriented x3  Pulm: Lung Commonly known as:  ALLEGRA-D        Take  by mouth. take 1 by Oral route  every 24 hours    Quantity:  90 tablet   Refills:  3       lidocaine 5 % Ptch   Commonly known as:  LIDODERM        Place 1 patch onto the skin daily.     Stop taking on:  6/15/2017

## (undated) NOTE — IP AVS SNAPSHOT
[address]  [address] [facility], [facility] ~ [phone]                Discharge Summary   6/5/2017    Espinoza Carnes           Admission Information        Provider Department    6/5/2017 Adi Felix MD [facility] 1sw Stop taking on:  6/15/2017    Tony Locks                           methocarbamol 500 MG Tabs   Commonly known as:  ROBAXIN   Next dose due:  Anytime as needed          Take 1 tablet (500 mg total) by mouth 3 (three) times daily.  PRN    [facility] [place] Immunization History as of 6/6/2017  Never Reviewed    No immunizations on file.       Recent Hematology Lab Results  (Last 3 results in the past 90 days)    WBC RBC Hemoglobin Hematocrit MCV MCH MCHC RDW Platelet MPV    (82/26/39)  8.0 (06/06/17)  4.84 (06 harming yourself, contact [facility] at [phone]. - If you don’t have insurance, Óscar Serna has partnered with Patient [address] to help you get signed up for insurance coverage.   Patient [place] retention (inability to urinate)   What to report to your healthcare team: Difficulty urinating, dizziness, no bowel movement in 2+ days, unresolved pain           Non-Narcotic Pain Medications     naproxen 500 MG Oral Tab    methocarbamol (ROBAXIN) 500 MG

## (undated) NOTE — ED AVS SNAPSHOT
[facility] Emergency Department    [address]     [place] [address]    Phone:  [phone]    Fax:  [address] [facility]   MRN: [id number]    Department:  [facility] Emergency Department   Date of Visit:  6/ doctor. Please ask your health care provider, pharmacist or nurse if you have any questions regarding your home medications, including potential side effects.               Medication List      START taking these medications     HYDROcodone-acetaminophen 5- visit does not uncover every injury or illness.  If you have been referred to a primary care or a specialist physician for a follow-up visit, please tell this physician (or your personal doctor if your instructions are to return to your personal doctor) abo [address] [facility] [address] ([address]) [phone]   [facility] [employer] ([address]) [address] [facility] General enercast. ([address] [place] [address],[address]) [facility] [address]  [place]

## (undated) NOTE — LETTER
[address] [place] [address] Rd, [place], [place]     AUTHORIZATION FOR SURGICAL OPERATION OR PROCEDURE    1.    I hereby authorize Dr. Samuels Harness my Physician(s) and whomever may be designated as the doctor's Assistant, to perform the following op donor transfusion, I will discuss this with my         Physician. 5.   I consent to the photographing of procedure(s) to be performed for the purposes of advancing medicine, science         and/or education, provided my identity is not revealed.  If the pr (Relationship to Patient)      _______________________________________________________________ ____________________________  (Witness signature)                                                                                                  (Date)